# Patient Record
Sex: MALE | Race: BLACK OR AFRICAN AMERICAN | NOT HISPANIC OR LATINO | Employment: OTHER | URBAN - METROPOLITAN AREA
[De-identification: names, ages, dates, MRNs, and addresses within clinical notes are randomized per-mention and may not be internally consistent; named-entity substitution may affect disease eponyms.]

---

## 2017-01-03 ENCOUNTER — PATIENT MESSAGE (OUTPATIENT)
Dept: FAMILY MEDICINE | Facility: CLINIC | Age: 57
End: 2017-01-03

## 2017-01-03 ENCOUNTER — PATIENT MESSAGE (OUTPATIENT)
Dept: NEUROSURGERY | Facility: CLINIC | Age: 57
End: 2017-01-03

## 2017-01-03 DIAGNOSIS — G93.89 BRAIN MASS: ICD-10-CM

## 2017-01-03 DIAGNOSIS — H49.21 SIXTH NERVE PALSY OF RIGHT EYE: Primary | ICD-10-CM

## 2017-01-04 PROBLEM — G93.89 BRAIN MASS: Status: ACTIVE | Noted: 2017-01-04

## 2017-01-05 ENCOUNTER — PATIENT MESSAGE (OUTPATIENT)
Dept: FAMILY MEDICINE | Facility: CLINIC | Age: 57
End: 2017-01-05

## 2017-01-05 ENCOUNTER — TELEPHONE (OUTPATIENT)
Dept: FAMILY MEDICINE | Facility: CLINIC | Age: 57
End: 2017-01-05

## 2017-01-05 NOTE — TELEPHONE ENCOUNTER
Dr. Kong would like this patient to see Dr. Munroe for Sixth nerve palsy of right eye and Brain mass.  Please call the patient to schedule an appointment.  Thank you

## 2017-01-10 ENCOUNTER — OFFICE VISIT (OUTPATIENT)
Dept: OPHTHALMOLOGY | Facility: CLINIC | Age: 57
End: 2017-01-10
Payer: COMMERCIAL

## 2017-01-10 DIAGNOSIS — H49.21 SIXTH NERVE PALSY OF RIGHT EYE: ICD-10-CM

## 2017-01-10 DIAGNOSIS — G93.89 BRAIN MASS: Primary | ICD-10-CM

## 2017-01-10 PROCEDURE — 92060 SENSORIMOTOR EXAMINATION: CPT | Mod: S$GLB,,, | Performed by: OPHTHALMOLOGY

## 2017-01-10 PROCEDURE — 92012 INTRM OPH EXAM EST PATIENT: CPT | Mod: S$GLB,,, | Performed by: OPHTHALMOLOGY

## 2017-01-10 PROCEDURE — 99999 PR PBB SHADOW E&M-EST. PATIENT-LVL II: CPT | Mod: PBBFAC,,, | Performed by: OPHTHALMOLOGY

## 2017-01-10 NOTE — PROGRESS NOTES
HPI     DLS:  12/6/16    Pt here for 1  month f/u.  Pt states he hasn't noticed a change since his   last visit. Pt states he is still having horizontal diplopia.    No eyedrops  NO Ocular Sx in the past       Last edited by Bri Smith MA on 1/10/2017  9:35 AM.         Assessment /Plan     For exam results, see Encounter Report.    Brain mass    Sixth nerve palsy of right eye      Gave 20 PD ESTRELLITA fresnel prism OD to control diplopia while going through treatment for brain mass  RTC 3mo

## 2017-01-17 ENCOUNTER — TELEPHONE (OUTPATIENT)
Dept: FAMILY MEDICINE | Facility: CLINIC | Age: 57
End: 2017-01-17

## 2017-01-17 ENCOUNTER — OFFICE VISIT (OUTPATIENT)
Dept: FAMILY MEDICINE | Facility: CLINIC | Age: 57
End: 2017-01-17
Payer: COMMERCIAL

## 2017-01-17 VITALS
OXYGEN SATURATION: 95 % | HEART RATE: 83 BPM | WEIGHT: 197.56 LBS | DIASTOLIC BLOOD PRESSURE: 86 MMHG | TEMPERATURE: 99 F | SYSTOLIC BLOOD PRESSURE: 132 MMHG | BODY MASS INDEX: 29.26 KG/M2 | HEIGHT: 69 IN

## 2017-01-17 DIAGNOSIS — Z00.00 ANNUAL PHYSICAL EXAM: Primary | ICD-10-CM

## 2017-01-17 DIAGNOSIS — G93.89 BRAIN MASS: ICD-10-CM

## 2017-01-17 DIAGNOSIS — E78.1 HYPERTRIGLYCERIDEMIA: ICD-10-CM

## 2017-01-17 DIAGNOSIS — I10 ESSENTIAL HYPERTENSION: ICD-10-CM

## 2017-01-17 DIAGNOSIS — H49.21 SIXTH NERVE PALSY OF RIGHT EYE: ICD-10-CM

## 2017-01-17 PROCEDURE — 99999 PR PBB SHADOW E&M-EST. PATIENT-LVL III: CPT | Mod: PBBFAC,,, | Performed by: FAMILY MEDICINE

## 2017-01-17 PROCEDURE — 99396 PREV VISIT EST AGE 40-64: CPT | Mod: S$GLB,,, | Performed by: FAMILY MEDICINE

## 2017-01-17 PROCEDURE — 3079F DIAST BP 80-89 MM HG: CPT | Mod: S$GLB,,, | Performed by: FAMILY MEDICINE

## 2017-01-17 PROCEDURE — 3075F SYST BP GE 130 - 139MM HG: CPT | Mod: S$GLB,,, | Performed by: FAMILY MEDICINE

## 2017-01-17 NOTE — PROGRESS NOTES
"Chief Complaint   Patient presents with    Hypertension    Hyperlipidemia     SUBJECTIVE:   Blas Alcocer is a 56 y.o. male presenting for his annual checkup.  Current Outpatient Prescriptions   Medication Sig Dispense Refill    amlodipine (NORVASC) 5 MG tablet Take 1 tablet (5 mg total) by mouth once daily. 90 tablet 3    fish oil-omega-3 fatty acids 300-1,000 mg capsule Take 2 g by mouth once daily.      LACTOBACILLUS COMBO NO.6 (PROBIOTIC COMPLEX ORAL) Take by mouth once daily.      lisinopril-hydrochlorothiazide (PRINZIDE,ZESTORETIC) 10-12.5 mg per tablet Take 1 tablet by mouth once daily. 90 tablet 3     No current facility-administered medications for this visit.      Allergies: Review of patient's allergies indicates no known allergies.     ROS:  Feeling well. No dyspnea or chest pain on exertion. No abdominal pain, change in bowel habits, black or bloody stools. No urinary tract or prostatic symptoms. No neurological complaints.  Still with ocular complaints  OBJECTIVE:   The patient appears well, alert, oriented x 3, in no distress.   Visit Vitals    /86 (BP Location: Right arm, Patient Position: Sitting, BP Method: Manual)    Pulse 83    Temp 98.6 °F (37 °C) (Oral)    Ht 5' 9" (1.753 m)    Wt 89.6 kg (197 lb 8.5 oz)    SpO2 95%    BMI 29.17 kg/m2     Wt Readings from Last 5 Encounters:   01/17/17 89.6 kg (197 lb 8.5 oz)   12/20/16 87.6 kg (193 lb 2 oz)   10/05/16 88.4 kg (194 lb 14.2 oz)   04/15/16 87 kg (191 lb 12.8 oz)   01/04/16 87.6 kg (193 lb 2 oz)       ENT normal, still with 6th nerve palsy.  Neck supple. No adenopathy or thyromegaly. KEVIN. Lungs are clear, good air entry, no wheezes, rhonchi or rales. S1 and S2 normal, no murmurs, regular rate and rhythm. Abdomen is soft without tenderness, guarding, mass or organomegaly.  exam: deferred.  Extremities show no edema, normal peripheral pulses. Neurological is normal without focal findings.    ASSESSMENT:   1. Annual physical " exam    2. Essential hypertension    3. Brain mass    4. Sixth nerve palsy of right eye          PLAN:   Blas was seen today for hypertension and hyperlipidemia.    Diagnoses and all orders for this visit:    Annual physical exam  -     CBC auto differential; Future  -     Comprehensive metabolic panel; Future  -     Hemoglobin A1c; Future  -     Lipid panel; Future  -     TSH; Future  -     Uric acid; Future  -     Urinalysis; Future  -     PSA, Screening; Future  Counseled on age appropriate medical preventative services, including age appropriate cancer screenings, over all nutritional health, need for a consistent exercise regimen and an over all push towards maintaining a vigorous and active lifestyle.  Counseled on age appropriate vaccines and discussed upcoming health care needs based on age/gender.  Spent time with patient counseling on need for a good patient/doctor relationship moving forward.  Discussed use of common OTC medications and supplements.  Discussed common dietary aids and use of caffeine and the need for good sleep hygiene and stress management.    Essential hypertension  The current medical regimen is effective;  continue present plan and medications.    Brain mass  Second opinion  Sixth nerve palsy of right eye  We will get this opinionated and plan of care.    Hasn't taken the fish oil

## 2017-01-17 NOTE — TELEPHONE ENCOUNTER
----- Message from Reddy Kong MD sent at 1/17/2017 10:17 AM CST -----  Schedule 6 month recall, thank you!

## 2017-01-18 ENCOUNTER — LAB VISIT (OUTPATIENT)
Dept: LAB | Facility: HOSPITAL | Age: 57
End: 2017-01-18
Attending: FAMILY MEDICINE
Payer: COMMERCIAL

## 2017-01-18 ENCOUNTER — OFFICE VISIT (OUTPATIENT)
Dept: NEUROSURGERY | Facility: CLINIC | Age: 57
End: 2017-01-18
Payer: COMMERCIAL

## 2017-01-18 VITALS
TEMPERATURE: 99 F | SYSTOLIC BLOOD PRESSURE: 127 MMHG | DIASTOLIC BLOOD PRESSURE: 80 MMHG | HEART RATE: 86 BPM | HEIGHT: 69 IN | BODY MASS INDEX: 29.18 KG/M2 | WEIGHT: 197 LBS

## 2017-01-18 DIAGNOSIS — D32.9 MENINGIOMA: Primary | ICD-10-CM

## 2017-01-18 DIAGNOSIS — Z00.00 ANNUAL PHYSICAL EXAM: ICD-10-CM

## 2017-01-18 DIAGNOSIS — H49.21 SIXTH NERVE PALSY OF RIGHT EYE: ICD-10-CM

## 2017-01-18 LAB
ALBUMIN SERPL BCP-MCNC: 3.9 G/DL
ALP SERPL-CCNC: 47 U/L
ALT SERPL W/O P-5'-P-CCNC: 30 U/L
ANION GAP SERPL CALC-SCNC: 9 MMOL/L
AST SERPL-CCNC: 21 U/L
BASOPHILS # BLD AUTO: 0.03 K/UL
BASOPHILS NFR BLD: 0.4 %
BILIRUB SERPL-MCNC: 0.8 MG/DL
BILIRUB UR QL STRIP: NEGATIVE
BUN SERPL-MCNC: 12 MG/DL
CALCIUM SERPL-MCNC: 9.4 MG/DL
CHLORIDE SERPL-SCNC: 106 MMOL/L
CHOLEST/HDLC SERPL: 5.2 {RATIO}
CLARITY UR: CLEAR
CO2 SERPL-SCNC: 23 MMOL/L
COLOR UR: YELLOW
COMPLEXED PSA SERPL-MCNC: 2.3 NG/ML
CREAT SERPL-MCNC: 0.9 MG/DL
DIFFERENTIAL METHOD: ABNORMAL
EOSINOPHIL # BLD AUTO: 0.1 K/UL
EOSINOPHIL NFR BLD: 0.9 %
ERYTHROCYTE [DISTWIDTH] IN BLOOD BY AUTOMATED COUNT: 14.9 %
EST. GFR  (AFRICAN AMERICAN): >60 ML/MIN/1.73 M^2
EST. GFR  (NON AFRICAN AMERICAN): >60 ML/MIN/1.73 M^2
GLUCOSE SERPL-MCNC: 117 MG/DL
GLUCOSE UR QL STRIP: NEGATIVE
HCT VFR BLD AUTO: 43.8 %
HDL/CHOLESTEROL RATIO: 19.1 %
HDLC SERPL-MCNC: 199 MG/DL
HDLC SERPL-MCNC: 38 MG/DL
HGB BLD-MCNC: 15.5 G/DL
HGB UR QL STRIP: ABNORMAL
KETONES UR QL STRIP: NEGATIVE
LDLC SERPL CALC-MCNC: 133 MG/DL
LEUKOCYTE ESTERASE UR QL STRIP: NEGATIVE
LYMPHOCYTES # BLD AUTO: 2.5 K/UL
LYMPHOCYTES NFR BLD: 32.8 %
MCH RBC QN AUTO: 27.9 PG
MCHC RBC AUTO-ENTMCNC: 35.4 %
MCV RBC AUTO: 79 FL
MICROSCOPIC COMMENT: ABNORMAL
MONOCYTES # BLD AUTO: 0.7 K/UL
MONOCYTES NFR BLD: 9.5 %
NEUTROPHILS # BLD AUTO: 4.2 K/UL
NEUTROPHILS NFR BLD: 56.1 %
NITRITE UR QL STRIP: NEGATIVE
NON-SQ EPI CELLS #/AREA URNS HPF: 2 /HPF
NONHDLC SERPL-MCNC: 161 MG/DL
PH UR STRIP: 5 [PH] (ref 5–8)
PLATELET # BLD AUTO: 266 K/UL
PMV BLD AUTO: 10.7 FL
POTASSIUM SERPL-SCNC: 4.6 MMOL/L
PROT SERPL-MCNC: 6.4 G/DL
PROT UR QL STRIP: NEGATIVE
RBC # BLD AUTO: 5.56 M/UL
RBC #/AREA URNS HPF: 4 /HPF (ref 0–4)
SODIUM SERPL-SCNC: 138 MMOL/L
SP GR UR STRIP: 1.02 (ref 1–1.03)
TRIGL SERPL-MCNC: 140 MG/DL
TSH SERPL DL<=0.005 MIU/L-ACNC: 0.41 UIU/ML
URATE SERPL-MCNC: 5.3 MG/DL
URN SPEC COLLECT METH UR: ABNORMAL
UROBILINOGEN UR STRIP-ACNC: NEGATIVE EU/DL
WBC # BLD AUTO: 7.55 K/UL

## 2017-01-18 PROCEDURE — 1159F MED LIST DOCD IN RCRD: CPT | Mod: S$GLB,,, | Performed by: NEUROLOGICAL SURGERY

## 2017-01-18 PROCEDURE — 99212 OFFICE O/P EST SF 10 MIN: CPT | Mod: S$GLB,,, | Performed by: NEUROLOGICAL SURGERY

## 2017-01-18 PROCEDURE — 99999 PR PBB SHADOW E&M-EST. PATIENT-LVL III: CPT | Mod: PBBFAC,,, | Performed by: NEUROLOGICAL SURGERY

## 2017-01-18 PROCEDURE — 3079F DIAST BP 80-89 MM HG: CPT | Mod: S$GLB,,, | Performed by: NEUROLOGICAL SURGERY

## 2017-01-18 PROCEDURE — 3074F SYST BP LT 130 MM HG: CPT | Mod: S$GLB,,, | Performed by: NEUROLOGICAL SURGERY

## 2017-01-18 NOTE — LETTER
January 18, 2017      Reddy Kong MD  [address] [address]           [facility] 7th Fl  [address]  [facility] [address]  Phone: [phone]          Patient: Blas Alcocer   MR Number: [id number]   YOB: 1960   Date of Visit: 1/18/2017       Dear Dr. Reddy Kong:    Thank you for referring Blas Alcocer to me for evaluation. Attached you will find relevant portions of my assessment and plan of care.    If you have questions, please do not hesitate to call me. I look forward to following Blas Alcocer along with you.    Sincerely,    Rico Munroe MD    Enclosure  CC:  No Recipients    If you would like to receive this communication electronically, please contact [email or web address] or [phone] to request more information on [employer] Link access.    For providers and/or their staff who would like to refer a patient to [facility], please contact us through our one-stop-shop provider referral line, [facility], at [phone].    If you feel you have received this communication in error or would no longer like to receive these types of communications, please e-mail [email or web address]

## 2017-01-18 NOTE — PROGRESS NOTES
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 male who has been complaining of diplopia when he looks to the right side. His symptoms started 2 years ago and have remained stable. Denies facial pain, headaches, vertigo, ataxia. He saw Dr Jones in 12/2016 who recommended radiosurgery. He is here for a second opinion.     PAST MEDICAL HISTORY:  Active Ambulatory Problems     Diagnosis Date Noted    Hypertriglyceridemia     Prediabetes     Hypovitaminosis D     Hypertension 08/19/2014    Sixth nerve palsy of right eye 12/06/2016    Brain mass 01/04/2017     Resolved Ambulatory Problems     Diagnosis Date Noted    No Resolved Ambulatory Problems     Past Medical History   Diagnosis Date    Dyslipidemia (high LDL; low HDL) 6/13    History of elevated prostate specific antigen (PSA)        PAST SURGICAL HISTORY:  Past Surgical History   Procedure Laterality Date    Prostate biopsy       negative       SOCIAL HISTORY:   Social History     Social History    Marital status: Significant Other     Spouse name: N/A    Number of children: N/A    Years of education: N/A     Occupational History    Not on file.     Social History Main Topics    Smoking status: Never Smoker    Smokeless tobacco: Not on file    Alcohol use Yes    Drug use: No    Sexual activity: Yes     Partners: Female     Other Topics Concern    Not on file     Social History Narrative       FAMILY HISTORY:  Family History   Problem Relation Age of Onset    Diabetes Sister     Hypertension Sister     Diabetes Brother     Blindness Neg Hx     Glaucoma Neg Hx     Retinal detachment Neg Hx     Macular degeneration Neg Hx        CURRENTS MEDICATIONS:  Current Outpatient Prescriptions  on File Prior to Visit   Medication Sig Dispense Refill    amlodipine (NORVASC) 5 MG tablet Take 1 tablet (5 mg total) by mouth once daily. 90 tablet 3    LACTOBACILLUS COMBO NO.6 (PROBIOTIC COMPLEX ORAL) Take by mouth once daily.      lisinopril-hydrochlorothiazide (PRINZIDE,ZESTORETIC) 10-12.5 mg per tablet Take 1 tablet by mouth once daily. 90 tablet 3    fish oil-omega-3 fatty acids 300-1,000 mg capsule Take 2 g by mouth once daily.       No current facility-administered medications on file prior to visit.        ALLERGIES:  Review of patient's allergies indicates:  No Known Allergies    REVIEW OF SYSTEMS:  Review of Systems   Constitutional: Negative for diaphoresis, fever and weight loss.   Respiratory: Negative for shortness of breath.    Cardiovascular: Negative for chest pain.   Gastrointestinal: Negative for blood in stool.   Genitourinary: Negative for hematuria.   Endo/Heme/Allergies: Does not bruise/bleed easily.   All other systems reviewed and are negative.      OBJECTIVE:    PHYSICAL EXAMINATION:   Vitals:    01/18/17 1513   BP: 127/80   Pulse: 86   Temp: 98.8 °F (37.1 °C)       Physical Exam:  Vitals reviewed.    Constitutional: He appears well-developed and well-nourished.     Eyes: Pupils are equal, round, and reactive to light. Conjunctivae are normal.     Cardiovascular: Normal distal pulses and no edema.     Abdominal: Soft.     Skin: Skin displays no rash on trunk and no rash on extremities. Skin displays no lesions on trunk and no lesions on extremities.     Psych/Behavior: He is alert. He is oriented to person, place, and time. He has a normal mood and affect.     Musculoskeletal:        Neck: Range of motion is full.     Neurological:        DTRs: Tricep reflexes are 2+ on the right side and 2+ on the left side. Bicep reflexes are 2+ on the right side and 2+ on the left side. Brachioradialis reflexes are 2+ on the right side and 2+ on the left side. Patellar reflexes are 2+ on the right  side and 2+ on the left side. Achilles reflexes are 2+ on the right side and 2+ on the left side.        Cranial nerves: Cranial nerve(s) VIII, IX, XI and XII are intact.       Back Exam     Muscle Strength   Right Quadriceps:  5/5   Left Quadriceps:  5/5   Right Hamstrings:  5/5   Left Hamstrings:  5/5             SI joint:   Palpation at the right and left SI joints not painful  MARIAELENA test is negative bilaterally  Gaenslen test is negative bilaterally  Thigh thrust test is negative bilaterally    Neurologic Exam     Mental Status   Oriented to person, place, and time.   Speech: speech is normal   Level of consciousness: alert    Cranial Nerves     CN II   Visual fields full to confrontation.     CN III, IV, VI   Pupils are equal, round, and reactive to light.  CN III: no CN III palsy  CN VI: right CN VI palsy  Diplopia: right and horizontal    CN V   Facial sensation intact.     CN VII   Facial expression full, symmetric.     CN VIII   CN VIII normal.     CN IX, X   CN IX normal.     CN XI   CN XI normal.     CN XII   CN XII normal.     Motor Exam   Muscle bulk: normal  Overall muscle tone: normal    Strength   Right deltoid: 5/5  Left deltoid: 5/5  Right biceps: 5/5  Left biceps: 5/5  Right triceps: 5/5  Left triceps: 5/5  Right wrist flexion: 5/5  Left wrist flexion: 5/5  Right wrist extension: 5/5  Left wrist extension: 5/5  Right interossei: 5/5  Left interossei: 5/5  Right iliopsoas: 5/5  Left iliopsoas: 5/5  Right quadriceps: 5/5  Left quadriceps: 5/5  Right hamstrin/5  Left hamstrin/5  Right anterior tibial: 5/5  Left anterior tibial: 5/5  Right posterior tibial: 5/5  Left posterior tibial: 5/5  Right peroneal: 5/5  Left peroneal: 5/5  Right gastroc: 5/5  Left gastroc: 5/5    Sensory Exam   Light touch normal.   Pinprick normal.     Gait, Coordination, and Reflexes     Gait  Gait: normal    Coordination   Finger to nose coordination: normal  Tandem walking coordination: normal    Reflexes   Right  brachioradialis: 2+  Left brachioradialis: 2+  Right biceps: 2+  Left biceps: 2+  Right triceps: 2+  Left triceps: 2+  Right patellar: 2+  Left patellar: 2+  Right achilles: 2+  Left achilles: 2+  Right plantar: normal  Left plantar: normal  Right Wang: absent  Left Wang: absent  Right ankle clonus: absent  Left ankle clonus: absent        DIAGNOSTIC DATA:  I personally reviewed the following imaging:   Brain MRI 12/12/2016: right petrous apex meningioma 0.8 x 1.6 x 1.8 cm (AP x TV x CC) compressing the right VI nerve near it entrance at the Dorello's canal    ASSESMENT:  This is a 56 y.o. 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    PLAN:  I explained the natural history of the disease and all treatment options including observation, surgery and/or radiosurgery. I recommended Gamma Knife radiosurgery with Dr Jones.  Patient would like to follow-up with Dr Jones to schedule his treatment.          Rico Munroe MD  Pager: [phone]

## 2017-01-19 ENCOUNTER — PATIENT MESSAGE (OUTPATIENT)
Dept: NEUROSURGERY | Facility: CLINIC | Age: 57
End: 2017-01-19

## 2017-01-19 LAB
ESTIMATED AVG GLUCOSE: 114 MG/DL
HBA1C MFR BLD HPLC: 5.6 %

## 2017-02-01 ENCOUNTER — OFFICE VISIT (OUTPATIENT)
Dept: NEUROSURGERY | Facility: CLINIC | Age: 57
End: 2017-02-01
Payer: COMMERCIAL

## 2017-02-01 VITALS
WEIGHT: 197.06 LBS | HEIGHT: 69 IN | SYSTOLIC BLOOD PRESSURE: 130 MMHG | DIASTOLIC BLOOD PRESSURE: 89 MMHG | HEART RATE: 71 BPM | BODY MASS INDEX: 29.19 KG/M2

## 2017-02-01 DIAGNOSIS — H49.21 SIXTH NERVE PALSY OF RIGHT EYE: ICD-10-CM

## 2017-02-01 DIAGNOSIS — D32.9 MENINGIOMA: Primary | ICD-10-CM

## 2017-02-01 PROCEDURE — 3075F SYST BP GE 130 - 139MM HG: CPT | Mod: S$GLB,,, | Performed by: NEUROLOGICAL SURGERY

## 2017-02-01 PROCEDURE — 99213 OFFICE O/P EST LOW 20 MIN: CPT | Mod: S$GLB,,, | Performed by: NEUROLOGICAL SURGERY

## 2017-02-01 PROCEDURE — 99999 PR PBB SHADOW E&M-EST. PATIENT-LVL III: CPT | Mod: PBBFAC,,, | Performed by: NEUROLOGICAL SURGERY

## 2017-02-01 PROCEDURE — 3079F DIAST BP 80-89 MM HG: CPT | Mod: S$GLB,,, | Performed by: NEUROLOGICAL SURGERY

## 2017-02-01 NOTE — PATIENT INSTRUCTIONS
The patient did follow up with Dr. Munroe, who agreed with my assessment and also recommended Gamma Knife Radiosurgery. I have explained that this is located near Dorello's canal, which is causing the right sixth cranial nerve palsy. We would be able to treat this quite accurately and effectively with Gamma Knife.  The patient would like to proceed with Radiosurgery. I have discussed the risks/benefits, indications, and alternatives for the proposed procedure in detail.  I have answered all of their questions and the pt wishes to proceed with the procedure. We will schedule the pt.

## 2017-02-01 NOTE — PROGRESS NOTES
"Subjective:    I, Emma Murcia, am scribing for, and in the presence of, Dr. Henrique Jones.     Patient ID: Blas Alcocer is a 56 y.o. male.    Chief Complaint: No chief complaint on file.    HPI This is a 56-year-old man who presents today for follow up after seeing Dr. Munroe. At last visit, I recommended Gamma Knife Radiosurgery for treatment of the meningioma. The pt stated that he would like a second opinion regarding this and followed up with Dr. Munroe. The patient denies any changes since last office visit. He denies any new complaints.    Review of Systems   Constitutional: Negative for activity change, fatigue and fever.   HENT: Negative for facial swelling.    Eyes: Positive for visual disturbance.   Respiratory: Negative.    Cardiovascular: Negative.    Gastrointestinal: Negative for diarrhea, nausea and vomiting.   Genitourinary: Negative.    Musculoskeletal: Negative for back pain, joint swelling and myalgias.   Neurological: Negative for seizures, weakness, numbness and headaches.   Psychiatric/Behavioral: Negative.        Past Medical History   Diagnosis Date    Dyslipidemia (high LDL; low HDL) 6/13    History of elevated prostate specific antigen (PSA)     Hypertension     Hypovitaminosis D 6/13    Prediabetes 6/13    Sixth nerve palsy of right eye 12/6/2016     Objective:       Visit Vitals    /89    Pulse 71    Ht 5' 9" (1.753 m)    Wt 89.4 kg (197 lb 1.5 oz)    BMI 29.11 kg/m2       Physical Exam   Constitutional: He is oriented to person, place, and time. He appears well-developed and well-nourished.   HENT:   Head: Normocephalic and atraumatic.   Neck: Neck supple.   Neurological: He is alert and oriented to person, place, and time. No cranial nerve deficit. He displays a negative Romberg sign. GCS eye subscore is 4. GCS verbal subscore is 5. GCS motor subscore is 6.       Imaging:  MRI of the brain, dated 12/12/2016, shows an small enhancing lesion measuring 1.8 cm centered in " the right prepontine cistern and extending towards the Dorello's canal, which appears to be a meningioma. This is causing a right abducens/sixth cranial nerve palsy.      I have personally reviewed the images with the pt.     I, Dr. Henrique Jones, personally performed the services described in this documentation as scribed by Emma Murcia in my presence, and it is both accurate and complete.  Assessment:       Meningioma.    Plan:   The patient did follow up with Dr. Munroe, who agreed with my assessment and also recommended Gamma Knife Radiosurgery. I have explained that this is located near Dorello's canal, which is causing the right sixth cranial nerve palsy. We would be able to treat this quite accurately and effectively with Gamma Knife.  The patient would like to proceed with Radiosurgery. I have discussed the risks/benefits, indications, and alternatives for the proposed procedure in detail.  I have answered all of their questions and the pt wishes to proceed with the procedure. We will schedule the pt.

## 2017-03-15 ENCOUNTER — OFFICE VISIT (OUTPATIENT)
Dept: NEUROSURGERY | Facility: CLINIC | Age: 57
End: 2017-03-15
Payer: COMMERCIAL

## 2017-03-15 ENCOUNTER — TELEPHONE (OUTPATIENT)
Dept: NEUROSURGERY | Facility: CLINIC | Age: 57
End: 2017-03-15

## 2017-03-15 VITALS
BODY MASS INDEX: 29.18 KG/M2 | TEMPERATURE: 98 F | WEIGHT: 197 LBS | HEART RATE: 65 BPM | DIASTOLIC BLOOD PRESSURE: 94 MMHG | HEIGHT: 69 IN | SYSTOLIC BLOOD PRESSURE: 145 MMHG

## 2017-03-15 DIAGNOSIS — H49.21 SIXTH NERVE PALSY OF RIGHT EYE: ICD-10-CM

## 2017-03-15 DIAGNOSIS — G93.89 BRAIN MASS: Primary | ICD-10-CM

## 2017-03-15 DIAGNOSIS — D32.9 MENINGIOMA: Primary | ICD-10-CM

## 2017-03-15 PROCEDURE — 99999 PR PBB SHADOW E&M-EST. PATIENT-LVL III: CPT | Mod: PBBFAC,,, | Performed by: NEUROLOGICAL SURGERY

## 2017-03-15 PROCEDURE — 3080F DIAST BP >= 90 MM HG: CPT | Mod: S$GLB,,, | Performed by: NEUROLOGICAL SURGERY

## 2017-03-15 PROCEDURE — 3077F SYST BP >= 140 MM HG: CPT | Mod: S$GLB,,, | Performed by: NEUROLOGICAL SURGERY

## 2017-03-15 PROCEDURE — 99024 POSTOP FOLLOW-UP VISIT: CPT | Mod: S$GLB,,, | Performed by: NEUROLOGICAL SURGERY

## 2017-03-15 PROCEDURE — 1160F RVW MEDS BY RX/DR IN RCRD: CPT | Mod: S$GLB,,, | Performed by: NEUROLOGICAL SURGERY

## 2017-03-15 NOTE — PATIENT INSTRUCTIONS
The patient is doing well 2-weeks after Gamma Knife Radiosurgery. I will schedule him follow up in 3 months with MRI of the brain. I would like him to follow up with Ophthalmology just prior to his follow up with me.

## 2017-03-15 NOTE — MR AVS SNAPSHOT
[facility] Neurosurgery 7th Fl  [address]  [facility] [address]  Phone: [phone]                  Blas Alcocer   3/15/2017 9:45 AM   Office Visit    Description:  Male : 1960   Provider:  Henrique Jones MD   Department:  [address]           Reason for Visit     Post-op Evaluation                To Do List           Future Appointments        Provider Department Dept Phone    2017 9:00 AM [facility] MRI4 [facility] [phone]    2017 10:30 AM Henrique Jones MD [facility] Neurosurgery 7th Fl [phone]      Goals (5 Years of Data)     None      [facility]     [facility] On Call Nurse Care Line -  Assistance  Registered nurses in the [facility] On Call Center provide clinical advisement, health education, appointment booking, and other advisory services.  Call for this free service at [phone].             Medications           Message regarding Medications     Verify the changes and/or additions to your medication regime listed below are the same as discussed with your clinician today.  If any of these changes or additions are incorrect, please notify your healthcare provider.             Verify that the below list of medications is an accurate representation of the medications you are currently taking.  If none reported, the list may be blank. If incorrect, please contact your healthcare provider. Carry this list with you in case of emergency.           Current Medications     amlodipine (NORVASC) 5 MG tablet Take 1 tablet (5 mg total) by mouth once daily.    fish oil-omega-3 fatty acids 300-1,000 mg capsule Take 2 g by mouth once daily.    LACTOBACILLUS COMBO NO.6 (PROBIOTIC COMPLEX ORAL) Take by mouth once daily.    lisinopril-hydrochlorothiazide (PRINZIDE,ZESTORETIC) 10-12.5 mg per tablet Take 1 tablet by mouth once daily.    saw palmetto 80 MG capsule Take 80 mg by mouth once daily.           Clinical Reference Information  "          Your Vitals Were     BP Pulse Temp Height Weight BMI    145/94 65 98.3 °F (36.8 °C) 5' 9" (1.753 m) 89.4 kg (197 lb) 29.09 kg/m2      Blood Pressure          Most Recent Value    BP  (!)  145/94      Allergies as of 3/15/2017     No Known Allergies      Immunizations Administered on Date of Encounter - 3/15/2017     None      Instructions    The patient is doing well 2-weeks after Gamma Knife Radiosurgery. I will schedule him follow up in 3 months with MRI of the brain. I would like him to follow up with Ophthalmology just prior to his follow up with me.       Language Assistance Services     ATTENTION: Language assistance services are available, free of charge. Please call [phone].      ATENCIÓN: Si mina morales, tiene a leong disposición servicios gratuitos de asistencia lingüística. Llame al [phone].     ANANTH Ý: N?u b?n nói Ti?ng Vi?t, có các d?ch v? h? tr? ngôn ng? mi?n phí dành cho b?n. G?i s? [phone].         [facility] complies with applicable Federal civil rights laws and does not discriminate on the basis of race, color, national origin, age, disability, or sex.        "

## 2017-03-15 NOTE — LETTER
March 15, 2017      Reddy Kong MD  [address] [address]           [facility] 7th Fl  [address]  [facility] [address]  Phone: [phone]          Patient: Blas Alcocer   MR Number: [id number]   YOB: 1960   Date of Visit: 3/15/2017       Dear Dr. Reddy Kong:    Thank you for referring Blas Alcocer to me for evaluation. Attached you will find relevant portions of my assessment and plan of care.    If you have questions, please do not hesitate to call me. I look forward to following Blas Alcocer along with you.    Sincerely,    Henrique Jones MD    Enclosure  CC:  No Recipients    If you would like to receive this communication electronically, please contact [email or web address] or [phone] to request more information on [employer] Link access.    For providers and/or their staff who would like to refer a patient to [facility], please contact us through our one-stop-shop provider referral line, [facility], at [phone].    If you feel you have received this communication in error or would no longer like to receive these types of communications, please e-mail [email or web address]

## 2017-03-15 NOTE — PROGRESS NOTES
"Subjective:    I, Emma Murcia, am scribing for, and in the presence of, Dr. Henrique Jnoes.     Patient ID: Blas Alcocer is a 56 y.o. male.    Chief Complaint: Post-op Evaluation    HPI This is a 56-year-old male with meningioma and sixth nerve palsy of the right eye who presents 2-weeks post-op Gamma Knife Radiosurgery. The patient states that he is feeling well. He reports experiencing scalp numbness immediately after the procedure, but states it has resolved. He has not experienced improvement of the sixth nerve palsy.    Review of Systems   Constitutional: Negative for activity change, fatigue and fever.   HENT: Negative for facial swelling.    Eyes:        Palsy of the right eye   Respiratory: Negative.    Cardiovascular: Negative.    Gastrointestinal: Negative for diarrhea, nausea and vomiting.   Genitourinary: Negative.    Musculoskeletal: Negative for back pain, joint swelling and myalgias.   Neurological: Positive for numbness (scalp numbness). Negative for seizures, weakness and headaches.   Psychiatric/Behavioral: Negative.        Past Medical History:   Diagnosis Date    Dyslipidemia (high LDL; low HDL) 6/13    History of elevated prostate specific antigen (PSA)     Hypertension     Hypovitaminosis D 6/13    Prediabetes 6/13    Sixth nerve palsy of right eye 12/6/2016     Objective:     BP (!) 145/94  Pulse 65  Temp 98.3 °F (36.8 °C)  Ht 5' 9" (1.753 m)  Wt 89.4 kg (197 lb)  BMI 29.09 kg/m2  Physical Exam   Constitutional: He is oriented to person, place, and time. He appears well-developed and well-nourished.   HENT:   Head: Normocephalic and atraumatic.   Eyes:   Sixth nerve palsy of the right eye   Neck: Neck supple.   Neurological: He is alert and oriented to person, place, and time. No cranial nerve deficit. He displays a negative Romberg sign. GCS eye subscore is 4. GCS verbal subscore is 5. GCS motor subscore is 6.       I, Dr. Henrique Jones, personally performed the services described " in this documentation as scribed by Emma Murcia in my presence, and it is both accurate and complete.  Assessment:       Meningioma    Plan:   The patient is doing well 2-weeks after Gamma Knife Radiosurgery. I will schedule him follow up in 3 months with MRI of the brain. I would like him to follow up with Ophthalmology just prior to his follow up with me.

## 2017-06-14 ENCOUNTER — HOSPITAL ENCOUNTER (OUTPATIENT)
Dept: RADIOLOGY | Facility: HOSPITAL | Age: 57
Discharge: HOME | End: 2017-06-14
Attending: NEUROLOGICAL SURGERY
Payer: COMMERCIAL

## 2017-06-14 ENCOUNTER — OFFICE VISIT (OUTPATIENT)
Dept: NEUROSURGERY | Facility: CLINIC | Age: 57
End: 2017-06-14
Payer: COMMERCIAL

## 2017-06-14 VITALS
WEIGHT: 193.88 LBS | HEART RATE: 73 BPM | SYSTOLIC BLOOD PRESSURE: 124 MMHG | DIASTOLIC BLOOD PRESSURE: 83 MMHG | HEIGHT: 69 IN | TEMPERATURE: 98 F | BODY MASS INDEX: 28.72 KG/M2

## 2017-06-14 DIAGNOSIS — G93.89 BRAIN MASS: Primary | ICD-10-CM

## 2017-06-14 DIAGNOSIS — D32.9 MENINGIOMA: ICD-10-CM

## 2017-06-14 DIAGNOSIS — H49.21 SIXTH NERVE PALSY OF RIGHT EYE: ICD-10-CM

## 2017-06-14 DIAGNOSIS — G93.89 BRAIN MASS: ICD-10-CM

## 2017-06-14 PROCEDURE — 99999 PR PBB SHADOW E&M-EST. PATIENT-LVL III: CPT | Mod: PBBFAC,,, | Performed by: NEUROLOGICAL SURGERY

## 2017-06-14 PROCEDURE — 99214 OFFICE O/P EST MOD 30 MIN: CPT | Mod: S$GLB,,, | Performed by: NEUROLOGICAL SURGERY

## 2017-06-14 PROCEDURE — 70553 MRI BRAIN STEM W/O & W/DYE: CPT | Mod: 26,,, | Performed by: RADIOLOGY

## 2017-06-14 RX ORDER — GADOBUTROL 604.72 MG/ML
10 INJECTION INTRAVENOUS
Status: COMPLETED | OUTPATIENT
Start: 2017-06-14 | End: 2017-06-14

## 2017-06-14 RX ADMIN — GADOBUTROL 10 ML: 604.72 INJECTION INTRAVENOUS at 09:06

## 2017-06-14 NOTE — PROGRESS NOTES
"Subjective:    I, Emma Henderson, am scribing for, and in the presence of, Dr. Henrique Jones.     Patient ID: Blas Alcocer is a 56 y.o. male.    Chief Complaint: Follow-up (3 month)    HPI This is 56-year-old male with history of meningioma and sixth nerve palsy, s/p Gamma Knife Radiosurgery, who presents today for 3 month follow up.  The patient states that his sixth nerve palsy of the right eye is improving. He has particularly noticed the improvement when playing golf.    Review of Systems   Constitutional: Negative for activity change, fatigue and fever.   HENT: Negative for facial swelling.    Eyes:        +Right eye palsy (improved compared to prior)   Respiratory: Negative.    Cardiovascular: Negative.    Gastrointestinal: Negative for diarrhea, nausea and vomiting.   Genitourinary: Negative.    Musculoskeletal: Negative for back pain, joint swelling and myalgias.   Neurological: Negative for seizures, weakness, numbness and headaches.   Psychiatric/Behavioral: Negative.        Past Medical History:   Diagnosis Date    Dyslipidemia (high LDL; low HDL) 6/13    History of elevated prostate specific antigen (PSA)     Hypertension     Hypovitaminosis D 6/13    Prediabetes 6/13    Sixth nerve palsy of right eye 12/6/2016     Objective:     /83 (BP Location: Right arm, Patient Position: Sitting, BP Method: Automatic)   Pulse 73   Temp 97.9 °F (36.6 °C) (Oral)   Ht 5' 9" (1.753 m)   Wt 88 kg (193 lb 14.4 oz)   BMI 28.63 kg/m²   Physical Exam   Constitutional: He is oriented to person, place, and time. He appears well-developed and well-nourished.   HENT:   Head: Normocephalic and atraumatic.   Neck: Neck supple.   Neurological: He is alert and oriented to person, place, and time. No cranial nerve deficit. He displays a negative Romberg sign. GCS eye subscore is 4. GCS verbal subscore is 5. GCS motor subscore is 6.       Imaging:  MRI of the brain w/wo contrast, dated 6/14/2017, shows decrease in size of " the lesion in the right prepontine cistern now measuring 17 mm x 6 mm. This is compared to the scan from 12/12/2016 when the mass measured 19 mm by 8 mm.    I have personally reviewed the images with the pt.      I, Dr. Henrique Jones, personally performed the services described in this documentation as scribed by Emma Henderson in my presence, and it is both accurate and complete.  Assessment:       1. Brain mass    2. Meningioma    3. Sixth nerve palsy of right eye        Plan:   I have reviewed the MRI of the brain w/wo contrast with the patient, which shows decrease in size of the lesion in the right prepontine cistern now measuring 17 mm x 6 mm. This is compared to the scan from 12/12/2016 when the mass measured 19 mm by 8 mm. I will schedule the patient follow up in 6 months with repeat MRI of the brain w/wo contrast at that time.

## 2017-06-14 NOTE — LETTER
June 19, 2017      Reddy Kong MD  [address] [place] [address]           [facility] - Neurosurgery 7th Fl  [address]  [facility] [address]  Phone: [phone]          Patient: Blas Alcocer   MR Number: [id number]   YOB: 1960   Date of Visit: 6/14/2017       Dear Dr. Reddy Kong:    Thank you for referring Blas Alcocer to me for evaluation. Attached you will find relevant portions of my assessment and plan of care.    If you have questions, please do not hesitate to call me. I look forward to following Blas Alcocer along with you.    Sincerely,    Rula Isaac  CC:  No Recipients    If you would like to receive this communication electronically, please contact [email or web address] or [phone] to request more information on [employer] Link access.    For providers and/or their staff who would like to refer a patient to [facility], please contact us through our one-stop-shop provider referral line, [facility] , at [phone].    If you feel you have received this communication in error or would no longer like to receive these types of communications, please e-mail [email or web address]

## 2017-06-14 NOTE — PATIENT INSTRUCTIONS
I have reviewed the MRI of the brain w/wo contrast with the patient, which shows decrease in size of the lesion in the right prepontine cistern now measuring 17 mm x 6 mm. This is compared to the scan from 12/12/2016 when the mass measured 19 mm by 8 mm. I will schedule the patient follow up in 6 months with repeat MRI of the brain w/wo contrast at that time.

## 2017-07-07 ENCOUNTER — OFFICE VISIT (OUTPATIENT)
Dept: FAMILY MEDICINE | Facility: CLINIC | Age: 57
End: 2017-07-07
Payer: COMMERCIAL

## 2017-07-07 ENCOUNTER — LAB VISIT (OUTPATIENT)
Dept: LAB | Facility: HOSPITAL | Age: 57
End: 2017-07-07
Attending: FAMILY MEDICINE
Payer: COMMERCIAL

## 2017-07-07 VITALS
BODY MASS INDEX: 28.73 KG/M2 | HEIGHT: 69 IN | HEART RATE: 84 BPM | DIASTOLIC BLOOD PRESSURE: 76 MMHG | OXYGEN SATURATION: 97 % | WEIGHT: 194 LBS | TEMPERATURE: 98 F | SYSTOLIC BLOOD PRESSURE: 110 MMHG

## 2017-07-07 DIAGNOSIS — G93.89 BRAIN MASS: Primary | ICD-10-CM

## 2017-07-07 DIAGNOSIS — Z97.3 WEARS GLASSES: ICD-10-CM

## 2017-07-07 DIAGNOSIS — Z01.00 ENCOUNTER FOR VISION SCREENING: ICD-10-CM

## 2017-07-07 DIAGNOSIS — R73.03 PREDIABETES: ICD-10-CM

## 2017-07-07 DIAGNOSIS — I10 ESSENTIAL HYPERTENSION: ICD-10-CM

## 2017-07-07 LAB
ANION GAP SERPL CALC-SCNC: 8 MMOL/L
BUN SERPL-MCNC: 15 MG/DL
CALCIUM SERPL-MCNC: 9.8 MG/DL
CHLORIDE SERPL-SCNC: 103 MMOL/L
CO2 SERPL-SCNC: 24 MMOL/L
CREAT SERPL-MCNC: 1 MG/DL
EST. GFR  (AFRICAN AMERICAN): >60 ML/MIN/1.73 M^2
EST. GFR  (NON AFRICAN AMERICAN): >60 ML/MIN/1.73 M^2
GLUCOSE SERPL-MCNC: 110 MG/DL
POTASSIUM SERPL-SCNC: 3.9 MMOL/L
SODIUM SERPL-SCNC: 135 MMOL/L

## 2017-07-07 PROCEDURE — 99999 PR PBB SHADOW E&M-EST. PATIENT-LVL III: CPT | Mod: PBBFAC,,, | Performed by: FAMILY MEDICINE

## 2017-07-07 PROCEDURE — 80048 BASIC METABOLIC PNL TOTAL CA: CPT

## 2017-07-07 PROCEDURE — 99214 OFFICE O/P EST MOD 30 MIN: CPT | Mod: S$GLB,,, | Performed by: FAMILY MEDICINE

## 2017-07-07 PROCEDURE — 36415 COLL VENOUS BLD VENIPUNCTURE: CPT

## 2017-07-07 RX ORDER — LISINOPRIL AND HYDROCHLOROTHIAZIDE 10; 12.5 MG/1; MG/1
1 TABLET ORAL DAILY
Qty: 90 TABLET | Refills: 3 | Status: SHIPPED | OUTPATIENT
Start: 2017-07-07 | End: 2018-08-02 | Stop reason: SDUPTHER

## 2017-07-07 RX ORDER — AMLODIPINE BESYLATE 5 MG/1
5 TABLET ORAL DAILY
Qty: 90 TABLET | Refills: 3 | Status: SHIPPED | OUTPATIENT
Start: 2017-07-07 | End: 2018-08-02 | Stop reason: SDUPTHER

## 2017-07-07 NOTE — PROGRESS NOTES
"Chief Complaint   Patient presents with    Hypertension       SUBJECTIVE:  Blas Alcocer is a 57 y.o. male here for follow up of brain mass and HTN.  Currently has co-morbidities including per problem list.      Social History   Substance Use Topics    Smoking status: Never Smoker    Smokeless tobacco: Never Used    Alcohol use Yes       Patient Active Problem List    Diagnosis Date Noted    Wears glasses 07/07/2017    Brain mass 01/04/2017     Most likley meningioma causing abducen's palsy      Sixth nerve palsy of right eye 12/06/2016    Hypertension 08/19/2014    Hypertriglyceridemia     Prediabetes     Hypovitaminosis D        Review of Systems   Constitutional: Negative.    HENT: Negative.    Eyes: Negative.    Respiratory: Negative.    Cardiovascular: Negative.    Gastrointestinal: Negative.    Genitourinary: Negative.    Musculoskeletal: Negative.    Skin: Negative.    Neurological: Negative.    Endo/Heme/Allergies: Negative.    Psychiatric/Behavioral: Negative.        OBJECTIVE:  /76 (BP Location: Right arm, Patient Position: Sitting, BP Method: Manual)   Pulse 84   Temp 97.9 °F (36.6 °C) (Oral)   Ht 5' 9" (1.753 m)   Wt 88 kg (194 lb 0.1 oz)   SpO2 97%   BMI 28.65 kg/m²     Wt Readings from Last 3 Encounters:   07/07/17 88 kg (194 lb 0.1 oz)   06/14/17 88 kg (193 lb 14.4 oz)   03/15/17 89.4 kg (197 lb)     BP Readings from Last 3 Encounters:   07/07/17 110/76   06/14/17 124/83   03/15/17 (!) 145/94       He appears well, in no apparent distress.  Alert and oriented times three, pleasant and cooperative. Vital signs are as documented in vital signs section.  S1 and S2 normal, no murmurs, clicks, gallops or rubs. Regular rate and rhythm. Chest is clear; no wheezes or rales. No edema or JVD.      Review of old Records:  Reviewed per epic     Review of old labs:    Lab Results   Component Value Date    TSH 0.412 01/18/2017     Lab Results   Component Value Date    WBC 7.55 01/18/2017    " HGB 15.5 01/18/2017    HCT 43.8 01/18/2017    MCV 79 (L) 01/18/2017     01/18/2017       Chemistry        Component Value Date/Time     01/18/2017 0853    K 4.6 01/18/2017 0853     01/18/2017 0853    CO2 23 01/18/2017 0853    BUN 12 01/18/2017 0853    CREATININE 0.9 01/18/2017 0853     (H) 01/18/2017 0853        Component Value Date/Time    CALCIUM 9.4 01/18/2017 0853    ALKPHOS 47 (L) 01/18/2017 0853    AST 21 01/18/2017 0853    ALT 30 01/18/2017 0853    BILITOT 0.8 01/18/2017 0853    ESTGFRAFRICA >60 01/18/2017 0853    EGFRNONAA >60 01/18/2017 0853        Lab Results   Component Value Date    CHOL 199 01/18/2017    CHOL 224 (H) 01/05/2016    CHOL 150 07/07/2015     Lab Results   Component Value Date    HDL 38 (L) 01/18/2017    HDL 37 (L) 01/05/2016    HDL 22 (L) 07/07/2015     Lab Results   Component Value Date    LDLCALC 133.0 01/18/2017    LDLCALC 161.6 (H) 01/05/2016    LDLCALC 83.0 07/07/2015     Lab Results   Component Value Date    TRIG 140 01/18/2017    TRIG 127 01/05/2016    TRIG 225 (H) 07/07/2015     Lab Results   Component Value Date    CHOLHDL 19.1 (L) 01/18/2017    CHOLHDL 16.5 (L) 01/05/2016    CHOLHDL 14.7 (L) 07/07/2015         Review of old imaging:  n/a      ASSESSMENT:  Problem List Items Addressed This Visit     Wears glasses    Relevant Orders    Ambulatory Referral to Optometry    Prediabetes    Hypertension    Relevant Medications    lisinopril-hydrochlorothiazide (PRINZIDE,ZESTORETIC) 10-12.5 mg per tablet    amlodipine (NORVASC) 5 MG tablet    Brain mass - Primary      Other Visit Diagnoses     Encounter for vision screening        Relevant Orders    Ambulatory Referral to Optometry          ICD-10-CM ICD-9-CM   1. Brain mass G93.9 348.9   2. Essential hypertension I10 401.9   3. Prediabetes R73.03 790.29   4. Wears glasses Z97.3 V49.89   5. Encounter for vision screening Z01.00 V72.0               PLAN:     brain mass is improving    The current medical regimen  is effective;  continue present plan and medications.  B/p is good    Get him to [facility] CAR[facility] for this    Medication List with Changes/Refills   Current Medications    FISH OIL-OMEGA-3 FATTY ACIDS 300-1,000 MG CAPSULE    Take 2 g by mouth once daily.    SAW PALMETTO 80 MG CAPSULE    Take 80 mg by mouth once daily.   Changed and/or Refilled Medications    Modified Medication Previous Medication    AMLODIPINE (NORVASC) 5 MG TABLET amlodipine (NORVASC) 5 MG tablet       Take 1 tablet (5 mg total) by mouth once daily.    Take 1 tablet (5 mg total) by mouth once daily.    LISINOPRIL-HYDROCHLOROTHIAZIDE (PRINZIDE,ZESTORETIC) 10-12.5 MG PER TABLET lisinopril-hydrochlorothiazide (PRINZIDE,ZESTORETIC) 10-12.5 mg per tablet       Take 1 tablet by mouth once daily.    Take 1 tablet by mouth once daily.   Discontinued Medications    LACTOBACILLUS COMBO NO.6 (PROBIOTIC COMPLEX ORAL)    Take by mouth once daily.       Return in about 3 months (around 10/7/2017) for assess treatment plan.

## 2017-08-02 ENCOUNTER — OFFICE VISIT (OUTPATIENT)
Dept: OPTOMETRY | Facility: CLINIC | Age: 57
End: 2017-08-02
Payer: COMMERCIAL

## 2017-08-02 DIAGNOSIS — H49.21 SIXTH NERVE PALSY OF RIGHT EYE: Primary | ICD-10-CM

## 2017-08-02 DIAGNOSIS — H52.4 HYPEROPIA WITH PRESBYOPIA, BILATERAL: ICD-10-CM

## 2017-08-02 DIAGNOSIS — H52.03 HYPEROPIA WITH PRESBYOPIA, BILATERAL: ICD-10-CM

## 2017-08-02 DIAGNOSIS — G93.89 BRAIN MASS: ICD-10-CM

## 2017-08-02 PROCEDURE — 92014 COMPRE OPH EXAM EST PT 1/>: CPT | Mod: S$GLB,,, | Performed by: OPTOMETRIST

## 2017-08-02 PROCEDURE — 99999 PR PBB SHADOW E&M-EST. PATIENT-LVL I: CPT | Mod: PBBFAC,,, | Performed by: OPTOMETRIST

## 2017-08-02 NOTE — LETTER
August 2, 2017      Reddy Kong MD  [address]  [place] [address]           [facility]  [address] [facility] [address]  [place] [address]  Phone: [phone]  Fax: [phone]          Patient: Blas Alcocer   MR Number: [id number]   YOB: 1960   Date of Visit: 8/2/2017       Dear Dr. Reddy Kong:    Thank you for referring Blas Alcocer to me for evaluation. Attached you will find relevant portions of my assessment and plan of care.    If you have questions, please do not hesitate to call me. I look forward to following Blas Alcocer along with you.    Sincerely,    Pamela Grady, OD    Enclosure  CC:  No Recipients    If you would like to receive this communication electronically, please contact [email or web address] or [phone] to request more information on [employer] Link access.    For providers and/or their staff who would like to refer a patient to [facility], please contact us through our one-stop-shop provider referral line, [facility], at [phone].    If you feel you have received this communication in error or would no longer like to receive these types of communications, please e-mail [email or web address]

## 2017-08-02 NOTE — PROGRESS NOTES
"Subjective:       Patient ID: Blas Alcocer is a 57 y.o. male      Chief Complaint   Patient presents with    Concerns About Ocular Health     History of Present Illness  Dls: 1/10/17 Dr. Macdonald    History of meningioma and sixth nerve palsy, s/p Gamma Knife Radiosurgery February 2017. Pt reports improvement in his diplopia and 6th nerve palsy. Pt reports he has been doing "eye exercises" to improve his muscle function since his procedure.     Pt wears pal's. Pt would like updated Rx today. Pt states no tearing no itching no burning no pain no ha's no floaters.     Eye meds:   None     Assessment/Plan:     1. Sixth nerve palsy of right eye  2. Brain mass  S/p gamma knife radiation 02/2017. His meningioma is decreasing in size and pt reports improvement in diplopia and 6th nerve palsy since procedure. Followed q6m by neurology with MRI scans. Pt was Rx fresnel prisms by Dr. Macdonald but did not have them filled. Pt states he will try them out. Pt to follow up with Dr. Macdonald.    3. Hyperopia with presbyopia, bilateral  Educated patient on refractive error and discussed lens options. Dispensed updated spectacle Rx. Educated about adaptation period to new specs.    Eyeglass Final Rx     Eyeglass Final Rx       Sphere Cylinder Add    Right +0.75 Sphere +2.25    Left +0.50 Sphere +2.25    Type:  PAL    Expiration Date:  8/3/2018                  Return for follow up with Dr. Macdonald as scheduled.       "

## 2017-08-17 ENCOUNTER — OFFICE VISIT (OUTPATIENT)
Dept: OPHTHALMOLOGY | Facility: CLINIC | Age: 57
End: 2017-08-17
Payer: COMMERCIAL

## 2017-08-17 DIAGNOSIS — H49.21 SIXTH NERVE PALSY OF RIGHT EYE: Primary | ICD-10-CM

## 2017-08-17 DIAGNOSIS — G93.89 BRAIN MASS: ICD-10-CM

## 2017-08-17 PROCEDURE — 92012 INTRM OPH EXAM EST PATIENT: CPT | Mod: S$GLB,,, | Performed by: OPHTHALMOLOGY

## 2017-08-17 PROCEDURE — 99999 PR PBB SHADOW E&M-EST. PATIENT-LVL II: CPT | Mod: PBBFAC,,, | Performed by: OPHTHALMOLOGY

## 2017-08-17 PROCEDURE — 92060 SENSORIMOTOR EXAMINATION: CPT | Mod: S$GLB,,, | Performed by: OPHTHALMOLOGY

## 2017-08-17 NOTE — PROGRESS NOTES
"HPI     DLS 08/02/17  By Dr. Pamela CARSON OD    56 Y/O M here today for 6th NP OD ck. Pt states" when I hold my head back   the diplopia goes away. Pt has a history of meningioma and sixth nerve   palsy, s/p Gamma Knife Radiosurgery February 2017. Pt reports improvement   in his diplopia and 6th nerve palsy. Pt reports he has been doing "eye   exercises" to improve his muscle function since his procedure. [facility]     Eye Meds : None    POHx:   1. 6th NP OD  2. Brain Mass  S/P Gamma Knife Radiation procedure 02/2017 by Dr. Robert MD  3. Hyperopia w/ presbyopia OU    Last edited by Maryam Carreon MA on 8/17/2017 10:48 AM. (History)        ROS     Positive for: Neurological, Eyes    Last edited by HODAN Macdonald Jr., MD on 8/17/2017 11:03 AM. (History)          Assessment /Plan     For exam results, see Encounter Report.    Sixth nerve palsy of right eye    Brain mass      Improvement in ET since Gamma Knife Radiosurgery in February 2017  Consider prism to control ET.  Fuses with 18 PD   Gave RX to fill if warranted   RTC 6 months after tumor resolves     This service was scribed by Eloise Ngo for, and in the presence of Dr Macdonald who personally performed this service.    Eloise Ngo, COA    Rukhsana Macdonald MD              "

## 2017-09-18 ENCOUNTER — PATIENT MESSAGE (OUTPATIENT)
Dept: ADMINISTRATIVE | Facility: OTHER | Age: 57
End: 2017-09-18

## 2017-12-07 ENCOUNTER — PATIENT MESSAGE (OUTPATIENT)
Dept: FAMILY MEDICINE | Facility: CLINIC | Age: 57
End: 2017-12-07

## 2017-12-19 ENCOUNTER — HOSPITAL ENCOUNTER (OUTPATIENT)
Dept: RADIOLOGY | Facility: HOSPITAL | Age: 57
Discharge: HOME | End: 2017-12-19
Attending: NEUROLOGICAL SURGERY
Payer: COMMERCIAL

## 2017-12-19 ENCOUNTER — OFFICE VISIT (OUTPATIENT)
Dept: NEUROSURGERY | Facility: CLINIC | Age: 57
End: 2017-12-19
Payer: COMMERCIAL

## 2017-12-19 VITALS
SYSTOLIC BLOOD PRESSURE: 129 MMHG | WEIGHT: 200.38 LBS | DIASTOLIC BLOOD PRESSURE: 94 MMHG | BODY MASS INDEX: 29.59 KG/M2 | HEART RATE: 74 BPM

## 2017-12-19 DIAGNOSIS — G93.89 BRAIN MASS: ICD-10-CM

## 2017-12-19 DIAGNOSIS — D32.9 MENINGIOMA: ICD-10-CM

## 2017-12-19 DIAGNOSIS — H49.21 SIXTH NERVE PALSY OF RIGHT EYE: Primary | ICD-10-CM

## 2017-12-19 DIAGNOSIS — H49.21 SIXTH NERVE PALSY OF RIGHT EYE: ICD-10-CM

## 2017-12-19 PROCEDURE — 99213 OFFICE O/P EST LOW 20 MIN: CPT | Mod: S$GLB,,, | Performed by: NEUROLOGICAL SURGERY

## 2017-12-19 PROCEDURE — 99999 PR PBB SHADOW E&M-EST. PATIENT-LVL III: CPT | Mod: PBBFAC,,, | Performed by: NEUROLOGICAL SURGERY

## 2017-12-19 PROCEDURE — 70553 MRI BRAIN STEM W/O & W/DYE: CPT | Mod: TC

## 2017-12-19 PROCEDURE — A9585 GADOBUTROL INJECTION: HCPCS | Performed by: NEUROLOGICAL SURGERY

## 2017-12-19 PROCEDURE — 25500020 PHARM REV CODE 255: Performed by: NEUROLOGICAL SURGERY

## 2017-12-19 PROCEDURE — 70553 MRI BRAIN STEM W/O & W/DYE: CPT | Mod: 26,,, | Performed by: RADIOLOGY

## 2017-12-19 RX ORDER — GADOBUTROL 604.72 MG/ML
9 INJECTION INTRAVENOUS
Status: COMPLETED | OUTPATIENT
Start: 2017-12-19 | End: 2017-12-19

## 2017-12-19 RX ADMIN — GADOBUTROL 9 ML: 604.72 INJECTION INTRAVENOUS at 01:12

## 2017-12-19 NOTE — PATIENT INSTRUCTIONS
I have reviewed the patient's MRI brain, which shows stable enhancing extra-axial lesion centered in the right prepontine cistern (8 mm).     I recommend the patient get prisms' prescription filled for his glasses.     I will schedule the patient a 1 year FU with MRI brain.     If the patient experiences any new/ worsening symptoms the patient should contact us.

## 2017-12-19 NOTE — PROGRESS NOTES
Subjective:    I, Yue Zee, am scribing for, and in the presence of, Dr. Henrique Jones.     Patient ID: Blas Alcocer is a 57 y.o. male.    Chief Complaint: Follow-up    HPI   Pt is a 58 yo male with brain mass who presents today for 6 month FU with MRI. Pt is s/p Gamma Knife Radiosurgery. Pt states he is doing well without new complications. Pt states his energy level is normal and he has stable vision since last office visit.    Review of Systems   Constitutional: Negative for chills and fever.   HENT: Negative.    Eyes: Negative.    Respiratory: Negative.    Cardiovascular: Negative.    Gastrointestinal: Negative.    Endocrine: Negative.    Genitourinary: Negative.    Musculoskeletal: Negative.    Skin: Negative.    Allergic/Immunologic: Negative.    Neurological: Negative for tremors, weakness, light-headedness, numbness and headaches.   Hematological: Negative.    Psychiatric/Behavioral: Negative.        Past Medical History:   Diagnosis Date    Dyslipidemia (high LDL; low HDL) 6/13    History of elevated prostate specific antigen (PSA)     Hypertension     Hypovitaminosis D 6/13    Prediabetes 6/13    Sixth nerve palsy of right eye 12/6/2016       Objective:     BP (!) 129/94   Pulse 74   Wt 90.9 kg (200 lb 6.4 oz)   BMI 29.59 kg/m²     Physical Exam   Constitutional: He is oriented to person, place, and time. He appears well-developed and well-nourished.   Eyes: Pupils are equal, round, and reactive to light.   + right eye palsy   Neurological: He is alert and oriented to person, place, and time. No cranial nerve deficit.       Imaging:  MRI Brain W WO Contrast 12/19/2017 shows stable enhancing extra-axial lesion centered in the right prepontine cistern (8 mm).     I have personally reviewed the images with the pt.      I, Dr. Henrique Jones, personally performed the services described in this documentation. All medical record entries made by the scribe were at my direction and in my presence.  I  have reviewed the chart and agree that the record reflects my personal performance and is accurate and complete. Henrique Jones MD.  3:09 PM 12/19/2017    Assessment:       1. Sixth nerve palsy of right eye    2. Meningioma        Plan:   I have reviewed the patient's MRI brain, which shows stable enhancing extra-axial lesion centered in the right prepontine cistern (8 mm).     I recommend the patient get prisms' prescription filled for his glasses.     I will schedule the patient a 1 year FU with MRI brain.     If the patient experiences any new/ worsening symptoms the patient should contact us.

## 2018-01-10 ENCOUNTER — OFFICE VISIT (OUTPATIENT)
Dept: FAMILY MEDICINE | Facility: CLINIC | Age: 58
End: 2018-01-10
Payer: COMMERCIAL

## 2018-01-10 VITALS
WEIGHT: 200.63 LBS | SYSTOLIC BLOOD PRESSURE: 148 MMHG | HEIGHT: 69 IN | BODY MASS INDEX: 29.71 KG/M2 | HEART RATE: 82 BPM | OXYGEN SATURATION: 96 % | DIASTOLIC BLOOD PRESSURE: 80 MMHG | TEMPERATURE: 98 F

## 2018-01-10 DIAGNOSIS — I10 ESSENTIAL HYPERTENSION: ICD-10-CM

## 2018-01-10 DIAGNOSIS — Z00.00 ANNUAL PHYSICAL EXAM: Primary | ICD-10-CM

## 2018-01-10 DIAGNOSIS — R73.03 PREDIABETES: ICD-10-CM

## 2018-01-10 PROCEDURE — 99396 PREV VISIT EST AGE 40-64: CPT | Mod: S$GLB,,, | Performed by: FAMILY MEDICINE

## 2018-01-10 PROCEDURE — 99999 PR PBB SHADOW E&M-EST. PATIENT-LVL III: CPT | Mod: PBBFAC,,, | Performed by: FAMILY MEDICINE

## 2018-01-10 NOTE — PROGRESS NOTES
"Chief Complaint   Patient presents with    Diabetes     SUBJECTIVE:   Blas Alcocer is a 57 y.o. male presenting for his annual checkup.  Current Outpatient Prescriptions   Medication Sig Dispense Refill    amlodipine (NORVASC) 5 MG tablet Take 1 tablet (5 mg total) by mouth once daily. 90 tablet 3    fish oil-omega-3 fatty acids 300-1,000 mg capsule Take 2 g by mouth once daily.      lisinopril-hydrochlorothiazide (PRINZIDE,ZESTORETIC) 10-12.5 mg per tablet Take 1 tablet by mouth once daily. 90 tablet 3    saw palmetto 80 MG capsule Take 80 mg by mouth once daily.       No current facility-administered medications for this visit.      Allergies: Patient has no known allergies.     ROS:  Feeling well. No dyspnea or chest pain on exertion. No abdominal pain, change in bowel habits, black or bloody stools. No urinary tract or prostatic symptoms. No neurological complaints.    OBJECTIVE:   The patient appears well, alert, oriented x 3, in no distress.   BP (!) 148/80   Pulse 82   Temp 97.6 °F (36.4 °C) (Oral)   Ht 5' 9" (1.753 m)   Wt 91 kg (200 lb 9.9 oz)   SpO2 96%   BMI 29.63 kg/m²      Wt Readings from Last 15 Encounters:   01/10/18 91 kg (200 lb 9.9 oz)   12/19/17 90.9 kg (200 lb 6.4 oz)   07/07/17 88 kg (194 lb 0.1 oz)   06/14/17 88 kg (193 lb 14.4 oz)   03/15/17 89.4 kg (197 lb)   02/01/17 89.4 kg (197 lb 1.5 oz)   01/18/17 89.4 kg (197 lb)   01/17/17 89.6 kg (197 lb 8.5 oz)   12/20/16 87.6 kg (193 lb 2 oz)   10/05/16 88.4 kg (194 lb 14.2 oz)   04/15/16 87 kg (191 lb 12.8 oz)   01/04/16 87.6 kg (193 lb 2 oz)   09/30/15 86.6 kg (191 lb)   07/10/15 89.4 kg (197 lb)   06/19/15 90.3 kg (199 lb)       ENT normal.  Neck supple. No adenopathy or thyromegaly. KEVIN. Lungs are clear, good air entry, no wheezes, rhonchi or rales. S1 and S2 normal, no murmurs, regular rate and rhythm. Abdomen is soft without tenderness, guarding, mass or organomegaly.  exam: deferred.  Extremities show no edema, normal " peripheral pulses. Neurological is normal without focal findings.    ASSESSMENT:   1. Annual physical exam    2. Essential hypertension    3. Prediabetes        PLAN:   Bals was seen today for diabetes.    Diagnoses and all orders for this visit:    Annual physical exam  -     CBC auto differential; Standing  -     Comprehensive metabolic panel; Standing  -     Hemoglobin A1c; Standing  -     Lipid panel; Standing  -     Testosterone Panel; Future  -     TSH; Standing  -     Urinalysis; Standing  -     Uric acid; Future  Counseled on age appropriate medical preventative services, including age appropriate cancer screenings, over all nutritional health, need for a consistent exercise regimen and an over all push towards maintaining a vigorous and active lifestyle.  Counseled on age appropriate vaccines and discussed upcoming health care needs based on age/gender.  Spent time with patient counseling on need for a good patient/doctor relationship moving forward.  Discussed use of common OTC medications and supplements.  Discussed common dietary aids and use of caffeine and the need for good sleep hygiene and stress management.    Essential hypertension  The current medical regimen is effective;  continue present plan and medications.    Prediabetes  Explained to patient that this is a complex metabolic process, related to genetics in the form of family history of this disease.  The other key factors are exposure to a poor diet high in simple carbs/sugars that is very prevalent in [place] and also a lack of exercise with poor weight control that leads to the changes that eventually becomes diabetes.  Explained to patient that in certain cases we can try to reverse these processes and prolong time to diabetes, or completely reverse the process.  Explained that the best chance of stopping the progression is a Diabetes Prevention Program, with or without Metformin.  Explained that we will refer the patient to local [facility]  that offers this program and encouraged patient to consider it.  If patient decides not to do this, they are counseled to let us know so we can initiate medical treatment.

## 2018-01-12 ENCOUNTER — LAB VISIT (OUTPATIENT)
Dept: LAB | Facility: HOSPITAL | Age: 58
End: 2018-01-12
Attending: FAMILY MEDICINE
Payer: COMMERCIAL

## 2018-01-12 DIAGNOSIS — Z00.00 ANNUAL PHYSICAL EXAM: ICD-10-CM

## 2018-01-12 LAB
ALBUMIN SERPL BCP-MCNC: 3.9 G/DL
ALP SERPL-CCNC: 55 U/L
ALT SERPL W/O P-5'-P-CCNC: 27 U/L
ANION GAP SERPL CALC-SCNC: 8 MMOL/L
AST SERPL-CCNC: 17 U/L
BASOPHILS # BLD AUTO: 0.06 K/UL
BASOPHILS NFR BLD: 0.8 %
BILIRUB SERPL-MCNC: 1 MG/DL
BUN SERPL-MCNC: 16 MG/DL
CALCIUM SERPL-MCNC: 9.5 MG/DL
CHLORIDE SERPL-SCNC: 103 MMOL/L
CHOLEST SERPL-MCNC: 214 MG/DL
CHOLEST/HDLC SERPL: 5 {RATIO}
CO2 SERPL-SCNC: 27 MMOL/L
CREAT SERPL-MCNC: 1 MG/DL
DIFFERENTIAL METHOD: ABNORMAL
EOSINOPHIL # BLD AUTO: 0 K/UL
EOSINOPHIL NFR BLD: 0.6 %
ERYTHROCYTE [DISTWIDTH] IN BLOOD BY AUTOMATED COUNT: 14.5 %
EST. GFR  (AFRICAN AMERICAN): >60 ML/MIN/1.73 M^2
EST. GFR  (NON AFRICAN AMERICAN): >60 ML/MIN/1.73 M^2
ESTIMATED AVG GLUCOSE: 114 MG/DL
GLUCOSE SERPL-MCNC: 119 MG/DL
HBA1C MFR BLD HPLC: 5.6 %
HCT VFR BLD AUTO: 45.5 %
HDLC SERPL-MCNC: 43 MG/DL
HDLC SERPL: 20.1 %
HGB BLD-MCNC: 16.4 G/DL
LDLC SERPL CALC-MCNC: 149.4 MG/DL
LYMPHOCYTES # BLD AUTO: 2.3 K/UL
LYMPHOCYTES NFR BLD: 31.8 %
MCH RBC QN AUTO: 28.5 PG
MCHC RBC AUTO-ENTMCNC: 36 G/DL
MCV RBC AUTO: 79 FL
MONOCYTES # BLD AUTO: 0.8 K/UL
MONOCYTES NFR BLD: 10.7 %
NEUTROPHILS # BLD AUTO: 4.1 K/UL
NEUTROPHILS NFR BLD: 56.8 %
NONHDLC SERPL-MCNC: 171 MG/DL
PLATELET # BLD AUTO: 239 K/UL
PMV BLD AUTO: 10.4 FL
POTASSIUM SERPL-SCNC: 4.5 MMOL/L
PROT SERPL-MCNC: 7 G/DL
RBC # BLD AUTO: 5.76 M/UL
SODIUM SERPL-SCNC: 138 MMOL/L
TRIGL SERPL-MCNC: 108 MG/DL
TSH SERPL DL<=0.005 MIU/L-ACNC: 0.44 UIU/ML
URATE SERPL-MCNC: 6 MG/DL
WBC # BLD AUTO: 7.23 K/UL

## 2018-01-12 PROCEDURE — 84550 ASSAY OF BLOOD/URIC ACID: CPT

## 2018-01-12 PROCEDURE — 83036 HEMOGLOBIN GLYCOSYLATED A1C: CPT

## 2018-01-12 PROCEDURE — 85025 COMPLETE CBC W/AUTO DIFF WBC: CPT

## 2018-01-12 PROCEDURE — 80061 LIPID PANEL: CPT

## 2018-01-12 PROCEDURE — 80053 COMPREHEN METABOLIC PANEL: CPT

## 2018-01-12 PROCEDURE — 82040 ASSAY OF SERUM ALBUMIN: CPT

## 2018-01-12 PROCEDURE — 84443 ASSAY THYROID STIM HORMONE: CPT

## 2018-01-12 PROCEDURE — 36415 COLL VENOUS BLD VENIPUNCTURE: CPT | Mod: PO

## 2018-01-15 LAB
ALBUMIN SERPL-MCNC: 4.3 G/DL (ref 3.6–5.1)
SHBG SERPL-SCNC: 37 NMOL/L (ref 22–77)
TESTOST FREE SERPL-MCNC: 63.9 PG/ML (ref 46–224)
TESTOST SERPL-MCNC: 513 NG/DL (ref 250–1100)
TESTOSTERONE.FREE+WB SERPL-MCNC: 125.8 NG/DL (ref 110–575)

## 2018-04-25 ENCOUNTER — PATIENT MESSAGE (OUTPATIENT)
Dept: FAMILY MEDICINE | Facility: CLINIC | Age: 58
End: 2018-04-25

## 2018-07-02 ENCOUNTER — PATIENT MESSAGE (OUTPATIENT)
Dept: FAMILY MEDICINE | Facility: CLINIC | Age: 58
End: 2018-07-02

## 2018-08-02 ENCOUNTER — PATIENT MESSAGE (OUTPATIENT)
Dept: FAMILY MEDICINE | Facility: CLINIC | Age: 58
End: 2018-08-02

## 2018-08-02 ENCOUNTER — OFFICE VISIT (OUTPATIENT)
Dept: FAMILY MEDICINE | Facility: CLINIC | Age: 58
End: 2018-08-02
Payer: COMMERCIAL

## 2018-08-02 ENCOUNTER — LAB VISIT (OUTPATIENT)
Dept: LAB | Facility: HOSPITAL | Age: 58
End: 2018-08-02
Attending: FAMILY MEDICINE
Payer: COMMERCIAL

## 2018-08-02 VITALS
SYSTOLIC BLOOD PRESSURE: 120 MMHG | WEIGHT: 191.81 LBS | OXYGEN SATURATION: 97 % | DIASTOLIC BLOOD PRESSURE: 80 MMHG | HEIGHT: 69 IN | HEART RATE: 75 BPM | TEMPERATURE: 98 F | BODY MASS INDEX: 28.41 KG/M2

## 2018-08-02 DIAGNOSIS — I10 ESSENTIAL HYPERTENSION: ICD-10-CM

## 2018-08-02 DIAGNOSIS — E78.1 HYPERTRIGLYCERIDEMIA: ICD-10-CM

## 2018-08-02 DIAGNOSIS — E78.1 HYPERTRIGLYCERIDEMIA: Primary | ICD-10-CM

## 2018-08-02 LAB
ALBUMIN SERPL BCP-MCNC: 4.2 G/DL
ALP SERPL-CCNC: 55 U/L
ALT SERPL W/O P-5'-P-CCNC: 19 U/L
ANION GAP SERPL CALC-SCNC: 11 MMOL/L
AST SERPL-CCNC: 19 U/L
BILIRUB SERPL-MCNC: 1.1 MG/DL
BUN SERPL-MCNC: 14 MG/DL
CALCIUM SERPL-MCNC: 9.9 MG/DL
CHLORIDE SERPL-SCNC: 104 MMOL/L
CHOLEST SERPL-MCNC: 195 MG/DL
CHOLEST/HDLC SERPL: 4.1 {RATIO}
CO2 SERPL-SCNC: 24 MMOL/L
CREAT SERPL-MCNC: 1.1 MG/DL
EST. GFR  (AFRICAN AMERICAN): >60 ML/MIN/1.73 M^2
EST. GFR  (NON AFRICAN AMERICAN): >60 ML/MIN/1.73 M^2
GLUCOSE SERPL-MCNC: 109 MG/DL
HDLC SERPL-MCNC: 47 MG/DL
HDLC SERPL: 24.1 %
LDLC SERPL CALC-MCNC: 134.2 MG/DL
NONHDLC SERPL-MCNC: 148 MG/DL
POTASSIUM SERPL-SCNC: 4.3 MMOL/L
PROT SERPL-MCNC: 7.4 G/DL
SODIUM SERPL-SCNC: 139 MMOL/L
TRIGL SERPL-MCNC: 69 MG/DL

## 2018-08-02 PROCEDURE — 99999 PR PBB SHADOW E&M-EST. PATIENT-LVL III: CPT | Mod: PBBFAC,,, | Performed by: FAMILY MEDICINE

## 2018-08-02 PROCEDURE — 80061 LIPID PANEL: CPT

## 2018-08-02 PROCEDURE — 99213 OFFICE O/P EST LOW 20 MIN: CPT | Mod: S$GLB,,, | Performed by: FAMILY MEDICINE

## 2018-08-02 PROCEDURE — 36415 COLL VENOUS BLD VENIPUNCTURE: CPT | Mod: PO

## 2018-08-02 PROCEDURE — 80053 COMPREHEN METABOLIC PANEL: CPT

## 2018-08-02 RX ORDER — LISINOPRIL AND HYDROCHLOROTHIAZIDE 10; 12.5 MG/1; MG/1
1 TABLET ORAL DAILY
Qty: 90 TABLET | Refills: 3 | Status: SHIPPED | OUTPATIENT
Start: 2018-08-02 | End: 2019-02-15 | Stop reason: SDUPTHER

## 2018-08-02 RX ORDER — AMLODIPINE BESYLATE 5 MG/1
5 TABLET ORAL DAILY
Qty: 90 TABLET | Refills: 3 | Status: SHIPPED | OUTPATIENT
Start: 2018-08-02 | End: 2019-08-05 | Stop reason: SDUPTHER

## 2018-08-02 NOTE — PROGRESS NOTES
"Chief Complaint   Patient presents with    Hypertension       SUBJECTIVE:  Blas Alcocer is a 58 y.o. male here for follow up of HTN and HLD and with better lifestyle change.  Currently has co-morbidities including per problem list.      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 No lumps or pain in the testicles.  The patient is taking hypertensive medications compliantly without side effects.  Denies chest pain, dyspnea, edema, or TIA's.    OBJECTIVE:  /80   Pulse 75   Temp 97.6 °F (36.4 °C) (Oral)   Ht 5' 9" (1.753 m)   Wt 87 kg (191 lb 12.8 oz)   SpO2 97%   BMI 28.32 kg/m²     Wt Readings from Last 3 Encounters:   08/02/18 87 kg (191 lb 12.8 oz)   01/10/18 91 kg (200 lb 9.9 oz)   12/19/17 90.9 kg (200 lb 6.4 oz)     BP Readings from Last 3 Encounters:   08/02/18 120/80   01/10/18 (!) 148/80   12/19/17 (!) 129/94       He appears well, in no apparent distress.  Alert and oriented times three, pleasant and cooperative. Vital signs are as documented in vital signs section.  Fundi are normal without hypertensive retinopathy. Chest is clear. Heart sounds normal, no gallop or murmur. No hepatosplenomegaly or pedal edema.      Review of old Records:  Reviewed per epic and [facility]    Review of old labs:    Lab Results   Component Value Date    TSH 0.437 01/12/2018     Lab Results   Component Value Date    WBC 7.23 01/12/2018    HGB 16.4 01/12/2018    HCT 45.5 01/12/2018    MCV 79 (L) 01/12/2018     01/12/2018       Chemistry      "   Component Value Date/Time     01/12/2018 0905    K 4.5 01/12/2018 0905     01/12/2018 0905    CO2 27 01/12/2018 0905    BUN 16 01/12/2018 0905    CREATININE 1.0 01/12/2018 0905     (H) 01/12/2018 0905        Component Value Date/Time    CALCIUM 9.5 01/12/2018 0905    ALKPHOS 55 01/12/2018 0905    AST 17 01/12/2018 0905    ALT 27 01/12/2018 0905    BILITOT 1.0 01/12/2018 0905    ESTGFRAFRICA >60 01/12/2018 0905    EGFRNONAA >60 01/12/2018 0905        Lab Results   Component Value Date    CHOL 214 (H) 01/12/2018    CHOL 199 01/18/2017    CHOL 224 (H) 01/05/2016     Lab Results   Component Value Date    HDL 43 01/12/2018    HDL 38 (L) 01/18/2017    HDL 37 (L) 01/05/2016     Lab Results   Component Value Date    LDLCALC 149.4 01/12/2018    LDLCALC 133.0 01/18/2017    LDLCALC 161.6 (H) 01/05/2016     Lab Results   Component Value Date    TRIG 108 01/12/2018    TRIG 140 01/18/2017    TRIG 127 01/05/2016     Lab Results   Component Value Date    CHOLHDL 20.1 01/12/2018    CHOLHDL 19.1 (L) 01/18/2017    CHOLHDL 16.5 (L) 01/05/2016         Review of old imaging:  n/a      ASSESSMENT:  Problem List Items Addressed This Visit     Hypertriglyceridemia - Primary    Relevant Orders    Comprehensive metabolic panel    Lipid panel    Hypertension    Relevant Medications    lisinopril-hydrochlorothiazide (PRINZIDE,ZESTORETIC) 10-12.5 mg per tablet    amLODIPine (NORVASC) 5 MG tablet    Other Relevant Orders    Comprehensive metabolic panel    Lipid panel          ICD-10-CM ICD-9-CM   1. Hypertriglyceridemia E78.1 272.1   2. Essential hypertension I10 401.9               PLAN:     Blas was seen today for hypertension.    Diagnoses and all orders for this visit:    Hypertriglyceridemia  -     Comprehensive metabolic panel; Future  -     Lipid panel; Future    Essential hypertension  -     lisinopril-hydrochlorothiazide (PRINZIDE,ZESTORETIC) 10-12.5 mg per tablet; Take 1 tablet by mouth once daily.  -      amLODIPine (NORVASC) 5 MG tablet; Take 1 tablet (5 mg total) by mouth once daily.  -     Comprehensive metabolic panel; Future  -     Lipid panel; Future      The current medical regimen is effective;  continue present plan and medications.    Medication List with Changes/Refills   Current Medications    FISH OIL-OMEGA-3 FATTY ACIDS 300-1,000 MG CAPSULE    Take 2 g by mouth once daily.    SAW PALMETTO 80 MG CAPSULE    Take 80 mg by mouth once daily.   Changed and/or Refilled Medications    Modified Medication Previous Medication    AMLODIPINE (NORVASC) 5 MG TABLET amlodipine (NORVASC) 5 MG tablet       Take 1 tablet (5 mg total) by mouth once daily.    Take 1 tablet (5 mg total) by mouth once daily.    LISINOPRIL-HYDROCHLOROTHIAZIDE (PRINZIDE,ZESTORETIC) 10-12.5 MG PER TABLET lisinopril-hydrochlorothiazide (PRINZIDE,ZESTORETIC) 10-12.5 mg per tablet       Take 1 tablet by mouth once daily.    Take 1 tablet by mouth once daily.       Follow-up in about 6 months (around 2/2/2019) for wellness exam.

## 2018-08-23 ENCOUNTER — TELEPHONE (OUTPATIENT)
Dept: FAMILY MEDICINE | Facility: CLINIC | Age: 58
End: 2018-08-23

## 2018-08-23 NOTE — TELEPHONE ENCOUNTER
----- Message from Reddy Kong MD sent at 8/2/2018 10:03 AM CDT -----  Schedule 6 month recall, thank you!

## 2018-12-08 ENCOUNTER — OFFICE VISIT (OUTPATIENT)
Dept: FAMILY MEDICINE | Facility: CLINIC | Age: 58
End: 2018-12-08
Payer: COMMERCIAL

## 2018-12-08 VITALS
DIASTOLIC BLOOD PRESSURE: 88 MMHG | OXYGEN SATURATION: 97 % | HEIGHT: 69 IN | BODY MASS INDEX: 27.6 KG/M2 | TEMPERATURE: 98 F | HEART RATE: 64 BPM | WEIGHT: 186.31 LBS | SYSTOLIC BLOOD PRESSURE: 132 MMHG

## 2018-12-08 DIAGNOSIS — Z00.00 ANNUAL PHYSICAL EXAM: Primary | ICD-10-CM

## 2018-12-08 DIAGNOSIS — I10 ESSENTIAL HYPERTENSION: ICD-10-CM

## 2018-12-08 PROCEDURE — 99396 PREV VISIT EST AGE 40-64: CPT | Mod: S$GLB,,, | Performed by: FAMILY MEDICINE

## 2018-12-08 PROCEDURE — 99999 PR PBB SHADOW E&M-EST. PATIENT-LVL III: CPT | Mod: PBBFAC,,, | Performed by: FAMILY MEDICINE

## 2018-12-08 NOTE — PROGRESS NOTES
"Chief Complaint   Patient presents with    Annual Exam     no comcerns     SUBJECTIVE:   Blas Alcocer is a 58 y.o. male presenting for his annual checkup.  Current Outpatient Medications   Medication Sig Dispense Refill    amLODIPine (NORVASC) 5 MG tablet Take 1 tablet (5 mg total) by mouth once daily. 90 tablet 3    fish oil-omega-3 fatty acids 300-1,000 mg capsule Take 2 g by mouth once daily.      lisinopril-hydrochlorothiazide (PRINZIDE,ZESTORETIC) 10-12.5 mg per tablet Take 1 tablet by mouth once daily. 90 tablet 3    saw palmetto 80 MG capsule Take 80 mg by mouth once daily.       No current facility-administered medications for this visit.      Allergies: Patient has no known allergies.     ROS:  Feeling well. No dyspnea or chest pain on exertion. No abdominal pain, change in bowel habits, black or bloody stools. No urinary tract or prostatic symptoms. No neurological complaints.    OBJECTIVE:   The patient appears well, alert, oriented x 3, in no distress.   BP (!) 132/90   Pulse 64   Temp 98.3 °F (36.8 °C) (Oral)   Ht 5' 9" (1.753 m)   Wt 84.5 kg (186 lb 4.6 oz)   SpO2 97%   BMI 27.51 kg/m²   ENT normal.  Neck supple. No adenopathy or thyromegaly. KEVIN. Lungs are clear, good air entry, no wheezes, rhonchi or rales. S1 and S2 normal, no murmurs, regular rate and rhythm. Abdomen is soft without tenderness, guarding, mass or organomegaly.  exam: deferred.  Extremities show no edema, normal peripheral pulses. Neurological is normal without focal findings.    ASSESSMENT:   1. Annual physical exam    2. Essential hypertension          PLAN:   Counseled on age appropriate medical preventative services, including age appropriate cancer screenings, over all nutritional health, need for a consistent exercise regimen and an over all push towards maintaining a vigorous and active lifestyle.  Counseled on age appropriate vaccines and discussed upcoming health care needs based on age/gender.  Spent time " with patient counseling on need for a good patient/doctor relationship moving forward.  Discussed use of common OTC medications and supplements.  Discussed common dietary aids and use of caffeine and the need for good sleep hygiene and stress management.    Leena to go get him a flu shot at the pharmacy or another clinic.    We will get him enrolled in the digital hypertension program.

## 2019-01-10 ENCOUNTER — TELEPHONE (OUTPATIENT)
Dept: FAMILY MEDICINE | Facility: CLINIC | Age: 59
End: 2019-01-10

## 2019-02-04 ENCOUNTER — PATIENT MESSAGE (OUTPATIENT)
Dept: FAMILY MEDICINE | Facility: CLINIC | Age: 59
End: 2019-02-04

## 2019-02-13 ENCOUNTER — PATIENT MESSAGE (OUTPATIENT)
Dept: OPHTHALMOLOGY | Facility: CLINIC | Age: 59
End: 2019-02-13

## 2019-02-14 ENCOUNTER — OFFICE VISIT (OUTPATIENT)
Dept: OPHTHALMOLOGY | Facility: CLINIC | Age: 59
End: 2019-02-14
Payer: COMMERCIAL

## 2019-02-14 ENCOUNTER — TELEPHONE (OUTPATIENT)
Dept: OPHTHALMOLOGY | Facility: CLINIC | Age: 59
End: 2019-02-14

## 2019-02-14 DIAGNOSIS — H50.00 ESOTROPIA: Primary | ICD-10-CM

## 2019-02-14 PROCEDURE — 99999 PR PBB SHADOW E&M-EST. PATIENT-LVL II: CPT | Mod: PBBFAC,,, | Performed by: OPHTHALMOLOGY

## 2019-02-14 PROCEDURE — 99999 PR PBB SHADOW E&M-EST. PATIENT-LVL II: ICD-10-PCS | Mod: PBBFAC,,, | Performed by: OPHTHALMOLOGY

## 2019-02-14 PROCEDURE — 92012 INTRM OPH EXAM EST PATIENT: CPT | Mod: S$GLB,,, | Performed by: OPHTHALMOLOGY

## 2019-02-14 PROCEDURE — 92060 SENSORIMOTOR EXAMINATION: CPT | Mod: S$GLB,,, | Performed by: OPHTHALMOLOGY

## 2019-02-14 PROCEDURE — 92012 PR EYE EXAM, EST PATIENT,INTERMED: ICD-10-PCS | Mod: S$GLB,,, | Performed by: OPHTHALMOLOGY

## 2019-02-14 PROCEDURE — 92060 PR SPECIAL EYE EVAL,SENSORIMOTOR: ICD-10-PCS | Mod: S$GLB,,, | Performed by: OPHTHALMOLOGY

## 2019-02-14 NOTE — TELEPHONE ENCOUNTER
Blas Alcocer H. Sprague Jr., MD Yesterday (11:41 AM)         Someone called to confirm my appointment date.  Unfortunately, I did not post into my calendar as I was waiting for a mail reminder.  Please schedule me for any Thursday or Friday.          02/14/19   PT was seen today by Dr. Macdonald for f/u with prism glasses. stj

## 2019-02-14 NOTE — PROGRESS NOTES
HPI     DLS 08/17/2017 by Dr. Torres MD    56 Y/O M here today for 6th NP OD for f/u of prism glasses. Pt feels   comfortable with glasses and wishes to have prism grinded into glasses.   stj       Eye Meds : None    POHx:   1. 6th NP OD  2. Brain Mass  S/P Gamma Knife Radiation procedure 02/2017 by Dr. Robert MD  3. Hyperopia w/ presbyopia OU    Last edited by Maryam Carreon MA on 2/14/2019 12:52 PM. (History)            Assessment /Plan     For exam results, see Encounter Report.    Esotropia      Decrease in prism correction   Gave MRX with 5 PD ESTRELLITA OU   Can consider strabismus surgery     RTC PRN     This service was scribed by Eloise Ngo for, and in the presence of Dr Macdonald who personally performed this service.    Eloise Ngo, COA    Rukhsana Macdonald MD

## 2019-02-15 ENCOUNTER — OFFICE VISIT (OUTPATIENT)
Dept: FAMILY MEDICINE | Facility: CLINIC | Age: 59
End: 2019-02-15
Payer: COMMERCIAL

## 2019-02-15 ENCOUNTER — LAB VISIT (OUTPATIENT)
Dept: LAB | Facility: HOSPITAL | Age: 59
End: 2019-02-15
Attending: FAMILY MEDICINE
Payer: COMMERCIAL

## 2019-02-15 VITALS
BODY MASS INDEX: 28.31 KG/M2 | SYSTOLIC BLOOD PRESSURE: 122 MMHG | WEIGHT: 191.13 LBS | TEMPERATURE: 98 F | DIASTOLIC BLOOD PRESSURE: 88 MMHG | HEIGHT: 69 IN | HEART RATE: 76 BPM | OXYGEN SATURATION: 97 % | RESPIRATION RATE: 18 BRPM

## 2019-02-15 DIAGNOSIS — Z00.00 ANNUAL PHYSICAL EXAM: ICD-10-CM

## 2019-02-15 DIAGNOSIS — I10 ESSENTIAL HYPERTENSION: ICD-10-CM

## 2019-02-15 DIAGNOSIS — Z00.00 ANNUAL PHYSICAL EXAM: Primary | ICD-10-CM

## 2019-02-15 LAB
ALBUMIN SERPL BCP-MCNC: 4.1 G/DL
ALP SERPL-CCNC: 51 U/L
ALT SERPL W/O P-5'-P-CCNC: 21 U/L
ANION GAP SERPL CALC-SCNC: 8 MMOL/L
AST SERPL-CCNC: 18 U/L
BASOPHILS # BLD AUTO: 0.04 K/UL
BASOPHILS NFR BLD: 0.6 %
BILIRUB SERPL-MCNC: 0.5 MG/DL
BUN SERPL-MCNC: 17 MG/DL
CALCIUM SERPL-MCNC: 9.8 MG/DL
CHLORIDE SERPL-SCNC: 104 MMOL/L
CHOLEST SERPL-MCNC: 189 MG/DL
CHOLEST/HDLC SERPL: 4 {RATIO}
CO2 SERPL-SCNC: 26 MMOL/L
COMPLEXED PSA SERPL-MCNC: 3.2 NG/ML
CREAT SERPL-MCNC: 0.9 MG/DL
DIFFERENTIAL METHOD: NORMAL
EOSINOPHIL # BLD AUTO: 0.1 K/UL
EOSINOPHIL NFR BLD: 1.2 %
ERYTHROCYTE [DISTWIDTH] IN BLOOD BY AUTOMATED COUNT: 14.4 %
EST. GFR  (AFRICAN AMERICAN): >60 ML/MIN/1.73 M^2
EST. GFR  (NON AFRICAN AMERICAN): >60 ML/MIN/1.73 M^2
ESTIMATED AVG GLUCOSE: 117 MG/DL
GLUCOSE SERPL-MCNC: 105 MG/DL
HBA1C MFR BLD HPLC: 5.7 %
HCT VFR BLD AUTO: 47.8 %
HDLC SERPL-MCNC: 47 MG/DL
HDLC SERPL: 24.9 %
HGB BLD-MCNC: 16.4 G/DL
LDLC SERPL CALC-MCNC: 118 MG/DL
LYMPHOCYTES # BLD AUTO: 2.1 K/UL
LYMPHOCYTES NFR BLD: 28.7 %
MCH RBC QN AUTO: 28.4 PG
MCHC RBC AUTO-ENTMCNC: 34.3 G/DL
MCV RBC AUTO: 83 FL
MONOCYTES # BLD AUTO: 0.7 K/UL
MONOCYTES NFR BLD: 10.1 %
NEUTROPHILS # BLD AUTO: 4.3 K/UL
NEUTROPHILS NFR BLD: 59.4 %
NONHDLC SERPL-MCNC: 142 MG/DL
PLATELET # BLD AUTO: 256 K/UL
PMV BLD AUTO: 10.3 FL
POTASSIUM SERPL-SCNC: 4.1 MMOL/L
PROT SERPL-MCNC: 7 G/DL
RBC # BLD AUTO: 5.78 M/UL
SODIUM SERPL-SCNC: 138 MMOL/L
T4 FREE SERPL-MCNC: 1 NG/DL
TRIGL SERPL-MCNC: 120 MG/DL
TSH SERPL DL<=0.005 MIU/L-ACNC: 0.35 UIU/ML
URATE SERPL-MCNC: 6.1 MG/DL
WBC # BLD AUTO: 7.26 K/UL

## 2019-02-15 PROCEDURE — 99999 PR PBB SHADOW E&M-EST. PATIENT-LVL III: ICD-10-PCS | Mod: PBBFAC,,, | Performed by: FAMILY MEDICINE

## 2019-02-15 PROCEDURE — 36415 COLL VENOUS BLD VENIPUNCTURE: CPT | Mod: PO

## 2019-02-15 PROCEDURE — 84439 ASSAY OF FREE THYROXINE: CPT

## 2019-02-15 PROCEDURE — 80061 LIPID PANEL: CPT

## 2019-02-15 PROCEDURE — 99396 PR PREVENTIVE VISIT,EST,40-64: ICD-10-PCS | Mod: S$GLB,,, | Performed by: FAMILY MEDICINE

## 2019-02-15 PROCEDURE — 84550 ASSAY OF BLOOD/URIC ACID: CPT

## 2019-02-15 PROCEDURE — 83036 HEMOGLOBIN GLYCOSYLATED A1C: CPT

## 2019-02-15 PROCEDURE — 99999 PR PBB SHADOW E&M-EST. PATIENT-LVL III: CPT | Mod: PBBFAC,,, | Performed by: FAMILY MEDICINE

## 2019-02-15 PROCEDURE — 99396 PREV VISIT EST AGE 40-64: CPT | Mod: S$GLB,,, | Performed by: FAMILY MEDICINE

## 2019-02-15 PROCEDURE — 80053 COMPREHEN METABOLIC PANEL: CPT

## 2019-02-15 PROCEDURE — 85025 COMPLETE CBC W/AUTO DIFF WBC: CPT

## 2019-02-15 PROCEDURE — 84153 ASSAY OF PSA TOTAL: CPT

## 2019-02-15 PROCEDURE — 84443 ASSAY THYROID STIM HORMONE: CPT

## 2019-02-15 RX ORDER — VALSARTAN AND HYDROCHLOROTHIAZIDE 80; 12.5 MG/1; MG/1
1 TABLET, FILM COATED ORAL DAILY
Qty: 90 TABLET | Refills: 3 | Status: SHIPPED | OUTPATIENT
Start: 2019-02-15 | End: 2020-02-24

## 2019-02-15 NOTE — PROGRESS NOTES
"Chief Complaint   Patient presents with    Orders     blood work     SUBJECTIVE:   Blas Alcocer is a 58 y.o. male presenting for his annual checkup.  Current Outpatient Medications   Medication Sig Dispense Refill    amLODIPine (NORVASC) 5 MG tablet Take 1 tablet (5 mg total) by mouth once daily. 90 tablet 3    fish oil-omega-3 fatty acids 300-1,000 mg capsule Take 2 g by mouth once daily.      saw palmetto 80 MG capsule Take 80 mg by mouth once daily.      valsartan-hydrochlorothiazide (DIOVAN-HCT) 80-12.5 mg per tablet Take 1 tablet by mouth once daily. 90 tablet 3     No current facility-administered medications for this visit.      Allergies: Patient has no known allergies.     ROS:  Feeling well. No dyspnea or chest pain on exertion. No abdominal pain, change in bowel habits, black or bloody stools. No urinary tract or prostatic symptoms. No neurological complaints.    OBJECTIVE:   The patient appears well, alert, oriented x 3, in no distress.   /88 (BP Location: Right arm, Patient Position: Sitting, BP Method: Large (Manual))   Pulse 76   Temp 98 °F (36.7 °C) (Oral)   Resp 18   Ht 5' 9" (1.753 m)   Wt 86.7 kg (191 lb 2.2 oz)   SpO2 97%   BMI 28.23 kg/m²      Wt Readings from Last 15 Encounters:   02/15/19 86.7 kg (191 lb 2.2 oz)   12/08/18 84.5 kg (186 lb 4.6 oz)   08/02/18 87 kg (191 lb 12.8 oz)   01/10/18 91 kg (200 lb 9.9 oz)   12/19/17 90.9 kg (200 lb 6.4 oz)   07/07/17 88 kg (194 lb 0.1 oz)   06/14/17 88 kg (193 lb 14.4 oz)   03/15/17 89.4 kg (197 lb)   02/01/17 89.4 kg (197 lb 1.5 oz)   01/18/17 89.4 kg (197 lb)   01/17/17 89.6 kg (197 lb 8.5 oz)   12/20/16 87.6 kg (193 lb 2 oz)   10/05/16 88.4 kg (194 lb 14.2 oz)   04/15/16 87 kg (191 lb 12.8 oz)   01/04/16 87.6 kg (193 lb 2 oz)       ENT normal.  Neck supple. No adenopathy or thyromegaly. KEVIN. Lungs are clear, good air entry, no wheezes, rhonchi or rales. S1 and S2 normal, no murmurs, regular rate and rhythm. Abdomen is soft " without tenderness, guarding, mass or organomegaly.  exam: deferred.  Extremities show no edema, normal peripheral pulses. Neurological is normal without focal findings.    ASSESSMENT:   1. Annual physical exam    2. Essential hypertension        PLAN:   Counseled on age appropriate medical preventative services, including age appropriate cancer screenings, over all nutritional health, need for a consistent exercise regimen and an over all push towards maintaining a vigorous and active lifestyle.  Counseled on age appropriate vaccines and discussed upcoming health care needs based on age/gender.  Spent time with patient counseling on need for a good patient/doctor relationship moving forward.  Discussed use of common OTC medications and supplements.  Discussed common dietary aids and use of caffeine and the need for good sleep hygiene and stress management.

## 2019-02-18 ENCOUNTER — PATIENT MESSAGE (OUTPATIENT)
Dept: FAMILY MEDICINE | Facility: CLINIC | Age: 59
End: 2019-02-18

## 2019-02-25 ENCOUNTER — PATIENT MESSAGE (OUTPATIENT)
Dept: OPHTHALMOLOGY | Facility: CLINIC | Age: 59
End: 2019-02-25

## 2019-03-18 ENCOUNTER — PATIENT MESSAGE (OUTPATIENT)
Dept: FAMILY MEDICINE | Facility: CLINIC | Age: 59
End: 2019-03-18

## 2019-04-01 ENCOUNTER — PATIENT MESSAGE (OUTPATIENT)
Dept: ADMINISTRATIVE | Facility: OTHER | Age: 59
End: 2019-04-01

## 2019-04-09 ENCOUNTER — PATIENT OUTREACH (OUTPATIENT)
Dept: OTHER | Facility: OTHER | Age: 59
End: 2019-04-09

## 2019-04-09 NOTE — PROGRESS NOTES
Last 5 Patient Entered Readings                                      Current 30 Day Average: 124/90     Recent Readings 4/5/2019 4/5/2019    SBP (mmHg) 124 126    DBP (mmHg) 92 88    Pulse 80 76          Digital Medicine: Health  Introduction    Introduced Mr. Blas Alcocer to Digital Medicine. Discussed health  role and recommended lifestyle modifications.    Lifestyle Assessment:  Current Dietary Habits(i.e. low sodium, food labels, dining out):  Patient states that he is avoiding carbs and fried food. He states he goes out to eat around twice a week and will sometimes get fast food. He mentions that when he gets fast food, it is either breakfast or a salad from [employer]. I will be sending him a low sodium recommendation list from [employer] via email.     Exercise:  Patient states he walks for 90 minutes everyday when weather permits.    Alcohol/Tobacco:  Deferred.    Medication Adherence: has been compliant with the medicaiton regimen    Other goals:   None. His only worry is his DBP which we discussed sodium intake.     Reviewed AHA/AACE recommendations:  Limit sodium intake to <2000mg/day  Perform 150 minutes of physical activity per week    Reviewed the importance of self-monitoring, medication adherence, and that the health  can be used as a resource for lifestyle modifications to help reduce or maintain a healthy lifestyle.  Reviewed that the Digital Medicine team is not available for emergencies and instructed the patient to call 911 or [facility] ([phone] or [phone]) if one arises.

## 2019-04-22 ENCOUNTER — PATIENT MESSAGE (OUTPATIENT)
Dept: OTHER | Facility: OTHER | Age: 59
End: 2019-04-22

## 2019-04-24 ENCOUNTER — PATIENT MESSAGE (OUTPATIENT)
Dept: FAMILY MEDICINE | Facility: CLINIC | Age: 59
End: 2019-04-24

## 2019-04-24 ENCOUNTER — PATIENT MESSAGE (OUTPATIENT)
Dept: ADMINISTRATIVE | Facility: OTHER | Age: 59
End: 2019-04-24

## 2019-04-24 DIAGNOSIS — M79.671 HEEL PAIN, BILATERAL: Primary | ICD-10-CM

## 2019-04-24 DIAGNOSIS — M79.672 HEEL PAIN, BILATERAL: Primary | ICD-10-CM

## 2019-04-25 ENCOUNTER — PATIENT MESSAGE (OUTPATIENT)
Dept: FAMILY MEDICINE | Facility: CLINIC | Age: 59
End: 2019-04-25

## 2019-04-25 PROBLEM — M79.671 HEEL PAIN, BILATERAL: Status: ACTIVE | Noted: 2019-04-25

## 2019-04-25 PROBLEM — M79.672 HEEL PAIN, BILATERAL: Status: ACTIVE | Noted: 2019-04-25

## 2019-04-25 RX ORDER — IBUPROFEN 800 MG/1
800 TABLET ORAL EVERY 8 HOURS
Qty: 21 TABLET | Refills: 0 | Status: SHIPPED | OUTPATIENT
Start: 2019-04-25 | End: 2019-05-25

## 2019-06-13 ENCOUNTER — PATIENT OUTREACH (OUTPATIENT)
Dept: OTHER | Facility: OTHER | Age: 59
End: 2019-06-13

## 2019-06-13 NOTE — PROGRESS NOTES
Last 5 Patient Entered Readings                                      Current 30 Day Average: 118/81     Recent Readings 6/13/2019 6/6/2019 6/5/2019 5/31/2019 5/29/2019    SBP (mmHg) 107 123 115 124 105    DBP (mmHg) 77 87 74 86 79    Pulse 65 70 73 69 82          HPI:  Called patient to follow up. Patient endorses adherence to medication regimen. Patient denies hypotensive s/sx (lightheadedness, dizziness, nausea, fatigue); patient denies hypertensive s/sx (SOB, CP, severe headaches, changes in vision). Instructed patient to seek medical care if BP > 180/110 and is accompanied by hypertensive s/sx associated, patient confirms understanding.     Assessment:  Reviewed recent readings. Per 2017 ACC/ AHA HTN guidelines (goal of BP < 130/80), current 30-day average needs to be addressed more thoroughly today.     Plan:  - Patient is working on lifestyle changes with Jaye, will continue.  - Take Bps 2-3 times weekly, advised on proper BP measurement technique and charging cuff at least once a month. Had been taking meds after taking Bps.  Continue current medication regimen. I will continue to monitor regularly and will follow-up in 3-4 weeks, sooner if blood pressure begins to trend upward or downward.     Current medication regimen:  Hypertension Medications             amLODIPine (NORVASC) 5 MG tablet Take 1 tablet (5 mg total) by mouth once daily.    valsartan-hydrochlorothiazide (DIOVAN-HCT) 80-12.5 mg per tablet Take 1 tablet by mouth once daily.          Patient denies having questions or concerns. Patient has my contact information and knows to call with any concerns or clinical changes.

## 2019-06-18 ENCOUNTER — PATIENT OUTREACH (OUTPATIENT)
Dept: OTHER | Facility: OTHER | Age: 59
End: 2019-06-18

## 2019-06-26 NOTE — PROGRESS NOTES
Last 5 Patient Entered Readings                                      Current 30 Day Average: 119/80     Recent Readings 6/25/2019 6/18/2019 6/17/2019 6/15/2019 6/13/2019    SBP (mmHg) 119 123 122 119 107    DBP (mmHg) 85 81 85 71 77    Pulse 75 77 74 75 65            Digital Medicine: Health  Follow Up    Left voicemail to follow up with Toby Blas BARTLETT Carlyn.  Current BP average 119/80 mmHg is at goal, 130/80 mmHg.

## 2019-06-26 NOTE — PROGRESS NOTES
Last 5 Patient Entered Readings                                      Current 30 Day Average: 119/80     Recent Readings 6/25/2019 6/18/2019 6/17/2019 6/15/2019 6/13/2019    SBP (mmHg) 119 123 122 119 107    DBP (mmHg) 85 81 85 71 77    Pulse 75 77 74 75 65          Digital Medicine: Health  Follow Up    Lifestyle Modifications:    1.Dietary Modifications (Sodium intake <2,000mg/day, food labels, dining out):   Patient states he has completely cut out fast food. He is using [employer] as opposed to salt. He has noticed a huge difference and plans to continue cooking from home.     2.Physical Activity:   Patient is continuing to exercise everyday. He is walking for an hour in the mornings and doing 30 minutes of strength and flexibility in the afternoons. He is also playing golf once a week. Encouraged to continue. His main focus now is weight loss and would like to get to 170 lbs.    3.Medication Therapy: Patient has been compliant with the medication regimen.    4.Patient has the following medication side effects/concerns:    None.   (Frequency/Alleviating factors/Precipitating factors, etc.)     Follow up with Mr. Blas BARTLETT Carlyn completed. No further questions or concerns. Will continue to follow up to achieve health goals.

## 2019-07-11 NOTE — PROGRESS NOTES
Last 5 Patient Entered Readings                                      Current 30 Day Average: 118/80     Recent Readings 7/10/2019 7/2/2019 7/1/2019 6/25/2019 6/18/2019    SBP (mmHg) 113 111 132 119 123    DBP (mmHg) 79 82 82 85 81    Pulse 62 85 85 75 77        Chart Reviewed. Patient's current 30-day average is at goal of <130/80 mmHg per 2017 ACC/AHA HTN guidelines. No medication recommendations at this time. I will continue monitoring.

## 2019-07-17 ENCOUNTER — TELEPHONE (OUTPATIENT)
Dept: FAMILY MEDICINE | Facility: CLINIC | Age: 59
End: 2019-07-17

## 2019-07-17 DIAGNOSIS — R21 RASH: Primary | ICD-10-CM

## 2019-07-17 RX ORDER — TRIAMCINOLONE ACETONIDE 5 MG/G
CREAM TOPICAL 2 TIMES DAILY
Qty: 15 G | Refills: 0 | Status: SHIPPED | OUTPATIENT
Start: 2019-07-17 | End: 2019-12-02

## 2019-07-17 NOTE — TELEPHONE ENCOUNTER
----- Message from Kena Gonsalez sent at 7/17/2019 12:22 PM CDT -----  Contact: ARNOLDO BURK [[id number]]  Name of Who is Calling: ARNOLDO BURK [[id number]]      What is the request in detail: patient would like medication prescribed for a ant bite. Please call     Can the clinic reply by [facility]: no      [facility] [phone]

## 2019-08-05 DIAGNOSIS — I10 ESSENTIAL HYPERTENSION: ICD-10-CM

## 2019-08-05 RX ORDER — AMLODIPINE BESYLATE 5 MG/1
TABLET ORAL
Qty: 90 TABLET | Refills: 0 | Status: SHIPPED | OUTPATIENT
Start: 2019-08-05 | End: 2019-08-22 | Stop reason: SDUPTHER

## 2019-08-15 ENCOUNTER — PATIENT OUTREACH (OUTPATIENT)
Dept: OTHER | Facility: OTHER | Age: 59
End: 2019-08-15

## 2019-08-15 NOTE — PROGRESS NOTES
HPI:  Called Mr. Blas Alcocer for hypertension digital medicine follow-up. Patient reports adherence to medication regimen with no complaints. He continues to work on weight loss and is is pleased that BP is trending down.     Last 5 Patient Entered Readings                                      Current 30 Day Average: 120/85     Recent Readings 8/15/2019 8/14/2019 8/13/2019 8/8/2019 8/6/2019    SBP (mmHg) 104 111 113 111 114    DBP (mmHg) 81 82 84 83 80    Pulse 86 99 84 75 77          Patient denies s/s of hypotension (lightheadedness, dizziness, nausea, fatigue) associated with low readings. Instructed patient to inform me if this occurs, patient confirms understanding.    Patient denies s/s of hypertension (SOB, CP, severe headaches, changes in vision) associated with high readings. Instructed patient to go to the ED if BP >180/110 and accompanied by hypertensive s/s, patient confirms understanding.    Assessment:  Patient's current 30-day average is approaching goal of <130/80 mmHg.       Plan:  Continue current regimen.  Patients health , Nupur Nix, will follow-up as scheduled.    I will continue to monitor regularly and will follow-up in 8-10 weeks, sooner if blood pressure begins to trend upward or downward.     Current medication regimen:  Hypertension Medications             amLODIPine (NORVASC) 5 MG tablet TAKE ONE TABLET BY MOUTH ONCE DAILY FOR BLOOD PRESSURE.    valsartan-hydrochlorothiazide (DIOVAN-HCT) 80-12.5 mg per tablet Take 1 tablet by mouth once daily.        Patient has my contact information and knows to call with any concerns or clinical changes.

## 2019-08-22 DIAGNOSIS — I10 ESSENTIAL HYPERTENSION: ICD-10-CM

## 2019-08-23 RX ORDER — AMLODIPINE BESYLATE 5 MG/1
TABLET ORAL
Qty: 90 TABLET | Refills: 0 | Status: SHIPPED | OUTPATIENT
Start: 2019-08-23 | End: 2020-01-24

## 2019-08-27 ENCOUNTER — PATIENT MESSAGE (OUTPATIENT)
Dept: FAMILY MEDICINE | Facility: CLINIC | Age: 59
End: 2019-08-27

## 2019-09-04 ENCOUNTER — PATIENT MESSAGE (OUTPATIENT)
Dept: OTHER | Facility: OTHER | Age: 59
End: 2019-09-04

## 2019-09-04 ENCOUNTER — PATIENT OUTREACH (OUTPATIENT)
Dept: OTHER | Facility: OTHER | Age: 59
End: 2019-09-04

## 2019-09-04 NOTE — PROGRESS NOTES
Last 5 Patient Entered Readings                                      Current 30 Day Average: 117/83     Recent Readings 9/4/2019 8/30/2019 8/27/2019 8/23/2019 8/22/2019    SBP (mmHg) 120 134 112 118 111    DBP (mmHg) 80 87 83 81 85    Pulse 78 58 76 70 84        Digital Medicine: Health  Follow Up    Lifestyle Modifications:    1.Dietary Modifications (Sodium intake <2,000mg/day, food labels, dining out): Patient reports that he's trying to stay away from fast food. Patient sent me a [employer] message today asking about other ways to help lower his DBP. We discussed hydration and water intake and patient reports that he does keep a bottle of water on his desk (18oz) but that he normally doesn't refill it. Encouraged him to drink more water and gave him the recommendation of drink about half his body weight in ounces of water. Patient reports that he would work toward that goal.     2.Physical Activity: deferred    3.Medication Therapy: Patient has been compliant with the medication regimen.    4.Patient has the following medication side effects/concerns: none  (Frequency/Alleviating factors/Precipitating factors, etc.)     Follow up with Mr. Blas Alcocer completed. Mr. Alcocer is doing well. Introduced myself as his new HC. No further questions or concerns. Will continue to follow up to achieve health goals.

## 2019-09-24 ENCOUNTER — PATIENT MESSAGE (OUTPATIENT)
Dept: FAMILY MEDICINE | Facility: CLINIC | Age: 59
End: 2019-09-24

## 2019-10-02 ENCOUNTER — PATIENT OUTREACH (OUTPATIENT)
Dept: OTHER | Facility: OTHER | Age: 59
End: 2019-10-02

## 2019-10-09 ENCOUNTER — PATIENT MESSAGE (OUTPATIENT)
Dept: ADMINISTRATIVE | Facility: OTHER | Age: 59
End: 2019-10-09

## 2019-10-10 NOTE — PROGRESS NOTES
Digital Medicine: Health  Follow-Up    Mr. Odonnell reports that he's doing well today. He apologized for not being able to talk last week. He was very stressed last week, and he thinks it showed in his BP readings.     The history is provided by the patient.     Follow Up  Follow-up reason(s): reading review and routine education      Readings are trending up due to stress.    Routine Education Topics: eating patterns and physical activity  Patient was happy with his reading today at 123/77. He said that he notices that his BP is great if he keeps up his morning routine of taking his medication, walking, then taking his BP. He noted that yesterday he forgot to take his medication before his reading, resulting in a higher reading of 144/92.        Diet:       The patient drinks 72 ounces of water per day.    He has the following dietary restrictions: low sodium diet    Patient reports that he's attempting to increase his water intake. He was only drinking about 1 18oz bottle of water a day and now he's drinking about 4.     Physical Activity:   When asked if exercising, patient responded: yes    Patient participates in the following activities: walking    Patient is walking about 5 miles a day. He uses an thaddeus on his phone to track the calories burned, his time, and distance.     INTERVENTION(S)  encouragement/support    PLAN  patient verbalizes understanding      There are no preventive care reminders to display for this patient.    Last 5 Patient Entered Readings                                      Current 30 Day Average: 126/90     Recent Readings 10/10/2019 10/9/2019 10/4/2019 10/3/2019 9/23/2019    SBP (mmHg) 123 144 120 121 123    DBP (mmHg) 77 92 89 87 96    Pulse 84 70 69 77 72

## 2019-11-01 ENCOUNTER — IMMUNIZATION (OUTPATIENT)
Dept: FAMILY MEDICINE | Facility: CLINIC | Age: 59
End: 2019-11-01
Payer: COMMERCIAL

## 2019-11-01 DIAGNOSIS — Z23 NEEDS FLU SHOT: Primary | ICD-10-CM

## 2019-11-01 PROCEDURE — 90471 IMMUNIZATION ADMIN: CPT | Mod: S$GLB,,, | Performed by: FAMILY MEDICINE

## 2019-11-01 PROCEDURE — 90686 FLU VACCINE (QUAD) GREATER THAN OR EQUAL TO 3YO PRESERVATIVE FREE IM: ICD-10-PCS | Mod: S$GLB,,, | Performed by: FAMILY MEDICINE

## 2019-11-01 PROCEDURE — 90686 IIV4 VACC NO PRSV 0.5 ML IM: CPT | Mod: S$GLB,,, | Performed by: FAMILY MEDICINE

## 2019-11-01 PROCEDURE — 90471 FLU VACCINE (QUAD) GREATER THAN OR EQUAL TO 3YO PRESERVATIVE FREE IM: ICD-10-PCS | Mod: S$GLB,,, | Performed by: FAMILY MEDICINE

## 2019-11-21 ENCOUNTER — PATIENT OUTREACH (OUTPATIENT)
Dept: OTHER | Facility: OTHER | Age: 59
End: 2019-11-21

## 2019-11-21 NOTE — PROGRESS NOTES
Digital Medicine: Health  Follow-Up    Mr. Odonnell reports that he's doing well today.     The history is provided by the patient.     Follow Up  Follow-up reason(s): reading review and routine education      Routine Education Topics: eating patterns and physical activity      Patient is still concerned about elevated DBP. We discussed different lifestyle changes that may help bring those numbers down.       INTERVENTION(S)  recommended diet modifications and encouragement/support    PLAN  patient verbalizes understanding      There are no preventive care reminders to display for this patient.    Last 5 Patient Entered Readings                                      Current 30 Day Average: 125/92     Recent Readings 11/21/2019 11/19/2019 11/15/2019 11/14/2019 11/11/2019    SBP (mmHg) 121 120 133 115 128    DBP (mmHg) 90 94 93 88 95    Pulse 81 75 93 74 78            Diet Screening       The patient drinks 24 ounces of water per day.    He has the following dietary restrictions: low sodium diet    Patient reports that he's doing well with his diet. He's sticking to low sodium foods, and cutting out fast food.     We discussed water intake. Patient reports that he really only drinks about 3 8oz bottles of water a day, one at each meal. We discussed recommendations for water intake being at half your body weight in oz of water a day. Encouraged patient to gradually increase water intake. We discussed tips to help increase intake : getting a reusable water bottle, keeping water bottle with you at all times, setting up bottle on desk as a reminder to drink more water. Will continue to check in and see how this effects DBP.     Intervention(s): increasing water intake    Physical Activity Screening   When asked if exercising, patient responded: yes    Patient participates in the following activities: walking and weights    Patient is continuing to walk 5 miles every morning. He's also trying to incorporate weight lifting  in the evenings 3-4x/week. Patient started weight this week.

## 2019-12-02 ENCOUNTER — LAB VISIT (OUTPATIENT)
Dept: LAB | Facility: HOSPITAL | Age: 59
End: 2019-12-02
Attending: FAMILY MEDICINE
Payer: COMMERCIAL

## 2019-12-02 ENCOUNTER — OFFICE VISIT (OUTPATIENT)
Dept: FAMILY MEDICINE | Facility: CLINIC | Age: 59
End: 2019-12-02
Payer: COMMERCIAL

## 2019-12-02 VITALS
TEMPERATURE: 98 F | OXYGEN SATURATION: 97 % | HEART RATE: 75 BPM | HEIGHT: 69 IN | DIASTOLIC BLOOD PRESSURE: 76 MMHG | WEIGHT: 186.31 LBS | BODY MASS INDEX: 27.6 KG/M2 | SYSTOLIC BLOOD PRESSURE: 112 MMHG

## 2019-12-02 DIAGNOSIS — Z00.00 ANNUAL PHYSICAL EXAM: ICD-10-CM

## 2019-12-02 DIAGNOSIS — I10 ESSENTIAL HYPERTENSION: ICD-10-CM

## 2019-12-02 DIAGNOSIS — Z00.00 ANNUAL PHYSICAL EXAM: Primary | ICD-10-CM

## 2019-12-02 LAB
ALBUMIN SERPL BCP-MCNC: 4.3 G/DL (ref 3.5–5.2)
ALP SERPL-CCNC: 54 U/L (ref 55–135)
ALT SERPL W/O P-5'-P-CCNC: 24 U/L (ref 10–44)
ANION GAP SERPL CALC-SCNC: 5 MMOL/L (ref 8–16)
AST SERPL-CCNC: 17 U/L (ref 10–40)
BASOPHILS # BLD AUTO: 0.05 K/UL (ref 0–0.2)
BASOPHILS NFR BLD: 0.7 % (ref 0–1.9)
BILIRUB SERPL-MCNC: 1.1 MG/DL (ref 0.1–1)
BUN SERPL-MCNC: 16 MG/DL (ref 6–20)
CALCIUM SERPL-MCNC: 10.1 MG/DL (ref 8.7–10.5)
CHLORIDE SERPL-SCNC: 103 MMOL/L (ref 95–110)
CHOLEST SERPL-MCNC: 213 MG/DL (ref 120–199)
CHOLEST/HDLC SERPL: 4.4 {RATIO} (ref 2–5)
CO2 SERPL-SCNC: 31 MMOL/L (ref 23–29)
CREAT SERPL-MCNC: 1 MG/DL (ref 0.5–1.4)
DIFFERENTIAL METHOD: ABNORMAL
EOSINOPHIL # BLD AUTO: 0.1 K/UL (ref 0–0.5)
EOSINOPHIL NFR BLD: 0.7 % (ref 0–8)
ERYTHROCYTE [DISTWIDTH] IN BLOOD BY AUTOMATED COUNT: 14 % (ref 11.5–14.5)
EST. GFR  (AFRICAN AMERICAN): >60 ML/MIN/1.73 M^2
EST. GFR  (NON AFRICAN AMERICAN): >60 ML/MIN/1.73 M^2
ESTIMATED AVG GLUCOSE: 117 MG/DL (ref 68–131)
GLUCOSE SERPL-MCNC: 121 MG/DL (ref 70–110)
HBA1C MFR BLD HPLC: 5.7 % (ref 4–5.6)
HCT VFR BLD AUTO: 47.9 % (ref 40–54)
HDLC SERPL-MCNC: 48 MG/DL (ref 40–75)
HDLC SERPL: 22.5 % (ref 20–50)
HGB BLD-MCNC: 16.4 G/DL (ref 14–18)
IMM GRANULOCYTES # BLD AUTO: 0.04 K/UL (ref 0–0.04)
IMM GRANULOCYTES NFR BLD AUTO: 0.6 % (ref 0–0.5)
LDLC SERPL CALC-MCNC: 146 MG/DL (ref 63–159)
LYMPHOCYTES # BLD AUTO: 1.6 K/UL (ref 1–4.8)
LYMPHOCYTES NFR BLD: 22.9 % (ref 18–48)
MCH RBC QN AUTO: 27.9 PG (ref 27–31)
MCHC RBC AUTO-ENTMCNC: 34.2 G/DL (ref 32–36)
MCV RBC AUTO: 82 FL (ref 82–98)
MONOCYTES # BLD AUTO: 0.8 K/UL (ref 0.3–1)
MONOCYTES NFR BLD: 11.5 % (ref 4–15)
NEUTROPHILS # BLD AUTO: 4.4 K/UL (ref 1.8–7.7)
NEUTROPHILS NFR BLD: 63.6 % (ref 38–73)
NONHDLC SERPL-MCNC: 165 MG/DL
NRBC BLD-RTO: 0 /100 WBC
PLATELET # BLD AUTO: 247 K/UL (ref 150–350)
PMV BLD AUTO: 10.4 FL (ref 9.2–12.9)
POTASSIUM SERPL-SCNC: 4.2 MMOL/L (ref 3.5–5.1)
PROT SERPL-MCNC: 7.2 G/DL (ref 6–8.4)
RBC # BLD AUTO: 5.87 M/UL (ref 4.6–6.2)
SODIUM SERPL-SCNC: 139 MMOL/L (ref 136–145)
T4 FREE SERPL-MCNC: 0.89 NG/DL (ref 0.71–1.51)
TRIGL SERPL-MCNC: 95 MG/DL (ref 30–150)
TSH SERPL DL<=0.005 MIU/L-ACNC: 0.34 UIU/ML (ref 0.4–4)
URATE SERPL-MCNC: 5.6 MG/DL (ref 3.4–7)
WBC # BLD AUTO: 6.94 K/UL (ref 3.9–12.7)

## 2019-12-02 PROCEDURE — 84550 ASSAY OF BLOOD/URIC ACID: CPT

## 2019-12-02 PROCEDURE — 83036 HEMOGLOBIN GLYCOSYLATED A1C: CPT

## 2019-12-02 PROCEDURE — 99999 PR PBB SHADOW E&M-EST. PATIENT-LVL III: CPT | Mod: PBBFAC,,, | Performed by: FAMILY MEDICINE

## 2019-12-02 PROCEDURE — 99999 PR PBB SHADOW E&M-EST. PATIENT-LVL III: ICD-10-PCS | Mod: PBBFAC,,, | Performed by: FAMILY MEDICINE

## 2019-12-02 PROCEDURE — 85025 COMPLETE CBC W/AUTO DIFF WBC: CPT

## 2019-12-02 PROCEDURE — 80061 LIPID PANEL: CPT

## 2019-12-02 PROCEDURE — 80053 COMPREHEN METABOLIC PANEL: CPT

## 2019-12-02 PROCEDURE — 99396 PREV VISIT EST AGE 40-64: CPT | Mod: S$GLB,,, | Performed by: FAMILY MEDICINE

## 2019-12-02 PROCEDURE — 84439 ASSAY OF FREE THYROXINE: CPT

## 2019-12-02 PROCEDURE — 84443 ASSAY THYROID STIM HORMONE: CPT

## 2019-12-02 PROCEDURE — 99396 PR PREVENTIVE VISIT,EST,40-64: ICD-10-PCS | Mod: S$GLB,,, | Performed by: FAMILY MEDICINE

## 2019-12-02 NOTE — PROGRESS NOTES
"Chief Complaint   Patient presents with    Annual Exam     SUBJECTIVE:   Blas Alcocer is a 59 y.o. male presenting for his annual checkup.  Current Outpatient Medications   Medication Sig Dispense Refill    amLODIPine (NORVASC) 5 MG tablet TAKE ONE TABLET BY MOUTH ONCE DAILY FOR BLOOD PRESSURE. 90 tablet 0    fish oil-omega-3 fatty acids 300-1,000 mg capsule Take 2 g by mouth once daily.      saw palmetto 80 MG capsule Take 80 mg by mouth once daily.      valsartan-hydrochlorothiazide (DIOVAN-HCT) 80-12.5 mg per tablet Take 1 tablet by mouth once daily. 90 tablet 3    triamcinolone acetonide 0.5% (KENALOG) 0.5 % Crea Apply topically 2 (two) times daily. (Patient not taking: Reported on 12/2/2019) 15 g 0     No current facility-administered medications for this visit.      Allergies: Patient has no known allergies.     ROS:  Feeling well. No dyspnea or chest pain on exertion. No abdominal pain, change in bowel habits, black or bloody stools. No urinary tract or prostatic symptoms. No neurological complaints.  Bloat and fever.    OBJECTIVE:   The patient appears well, alert, oriented x 3, in no distress.   /76   Pulse 75   Temp 97.5 °F (36.4 °C) (Oral)   Ht 5' 9" (1.753 m)   Wt 84.5 kg (186 lb 4.6 oz)   SpO2 97%   BMI 27.51 kg/m²      Wt Readings from Last 15 Encounters:   12/02/19 84.5 kg (186 lb 4.6 oz)   02/15/19 86.7 kg (191 lb 2.2 oz)   12/08/18 84.5 kg (186 lb 4.6 oz)   08/02/18 87 kg (191 lb 12.8 oz)   01/10/18 91 kg (200 lb 9.9 oz)   12/19/17 90.9 kg (200 lb 6.4 oz)   07/07/17 88 kg (194 lb 0.1 oz)   06/14/17 88 kg (193 lb 14.4 oz)   03/15/17 89.4 kg (197 lb)   02/01/17 89.4 kg (197 lb 1.5 oz)   01/18/17 89.4 kg (197 lb)   01/17/17 89.6 kg (197 lb 8.5 oz)   12/20/16 87.6 kg (193 lb 2 oz)   10/05/16 88.4 kg (194 lb 14.2 oz)   04/15/16 87 kg (191 lb 12.8 oz)       ENT normal.  Neck supple. No adenopathy or thyromegaly. KEVIN. Lungs are clear, good air entry, no wheezes, rhonchi or rales. S1 " and S2 normal, no murmurs, regular rate and rhythm. Abdomen is soft without tenderness, guarding, mass or organomegaly.  exam: .  Extremities show no edema, normal peripheral pulses. Neurological is normal without focal findings.    ASSESSMENT:   1. Annual physical exam        PLAN:   Blas was seen today for annual exam.    Diagnoses and all orders for this visit:    Annual physical exam  -     CBC auto differential; Future  -     Comprehensive metabolic panel; Future  -     Hemoglobin A1c; Future  -     Lipid panel; Future  -     Urinalysis; Future  -     Uric acid; Future  -     TSH; Future      Counseled on age appropriate medical preventative services, including age appropriate cancer screenings, over all nutritional health, need for a consistent exercise regimen and an over all push towards maintaining a vigorous and active lifestyle.  Counseled on age appropriate vaccines and discussed upcoming health care needs based on age/gender.  Spent time with patient counseling on need for a good patient/doctor relationship moving forward.  Discussed use of common OTC medications and supplements.  Discussed common dietary aids and use of caffeine and the need for good sleep hygiene and stress management.    Work on fluids first then we will see about sleep.

## 2019-12-03 ENCOUNTER — PATIENT MESSAGE (OUTPATIENT)
Dept: FAMILY MEDICINE | Facility: CLINIC | Age: 59
End: 2019-12-03

## 2019-12-19 ENCOUNTER — PATIENT OUTREACH (OUTPATIENT)
Dept: OTHER | Facility: OTHER | Age: 59
End: 2019-12-19

## 2019-12-24 ENCOUNTER — PATIENT OUTREACH (OUTPATIENT)
Dept: OTHER | Facility: OTHER | Age: 59
End: 2019-12-24

## 2019-12-24 NOTE — PROGRESS NOTES
Digital Medicine: Clinician Follow-Up    Called patient for digital medicine follow-up. Says that he is doing well overall but, has noticed that he has gained 8 lbs back that he had lost. Says that he has been traveling and not exercising or watching what he eats as before. Currently taking valsartan-hct and amlodipine and denies missed doses.     The history is provided by the patient. No  was used.     Follow Up  Follow-up reason(s): reading review              Assessment:  Patient's current 30-day average of 125/87 mmHg is approaching goal of <130/80 mmHg.       Plan:  Continue current regimen.  Resume low sodium diet and exercise.   Patients health , Nupur Nix, will follow-up as scheduled.    I will continue to monitor regularly and will follow-up in 8-10 weeks, sooner if blood pressure begins to trend upward or downward.     Patient has my contact information and knows to call with any concerns or clinical changes.            There are no preventive care reminders to display for this patient.    Last 5 Patient Entered Readings                                      Current 30 Day Average: 125/87     Recent Readings 12/13/2019 12/12/2019 12/11/2019 12/2/2019 12/2/2019    SBP (mmHg) 124 116 119 132 112    DBP (mmHg) 78 87 96 94 78    Pulse 65 74 78 79 75             Hypertension Medications             amLODIPine (NORVASC) 5 MG tablet TAKE ONE TABLET BY MOUTH ONCE DAILY FOR BLOOD PRESSURE.    valsartan-hydrochlorothiazide (DIOVAN-HCT) 80-12.5 mg per tablet Take 1 tablet by mouth once daily.                 Screenings

## 2020-01-17 NOTE — PROGRESS NOTES
Digital Medicine: Health  Follow-Up    Mr. Odonnell reports that he's doing well. He admits that he fell off track of his diet at the end of December, but that he's doing much better now.     The history is provided by the patient.     Follow Up  Follow-up reason(s): reading review and routine education      Routine Education Topics: eating patterns        INTERVENTION(S)  recommended diet modifications, encouragement/support and DASH diet    PLAN  patient verbalizes understanding      There are no preventive care reminders to display for this patient.    Last 5 Patient Entered Readings                                      Current 30 Day Average: 123/88     Recent Readings 1/17/2020 1/15/2020 1/10/2020 1/7/2020 12/13/2019    SBP (mmHg) 117 120 117 136 124    DBP (mmHg) 86 90 81 93 78    Pulse 90 79 88 80 65            Diet Screening       Patient asked about what diet I thought would be best to follow. Gave he the basics of the DASH diet and explained the importance of increasing veggies, decreasing processed sugar, eating carbs in moderation, and eating lean meats. Patient asked for information to be sent to him via email.     Asked patient about water intake. He was up to about 3 8oz bottles of water a day. He reports that he's cut back on water intake as per advice of his PCP, Dr. Kong. He said that if he gets thirsty he will just eat a juicy piece of fruit.     Intervention(s): DASH diet

## 2020-01-24 DIAGNOSIS — I10 ESSENTIAL HYPERTENSION: ICD-10-CM

## 2020-01-24 RX ORDER — AMLODIPINE BESYLATE 5 MG/1
TABLET ORAL
Qty: 90 TABLET | Refills: 3 | Status: SHIPPED | OUTPATIENT
Start: 2020-01-24 | End: 2020-02-24

## 2020-02-24 ENCOUNTER — PATIENT OUTREACH (OUTPATIENT)
Dept: OTHER | Facility: OTHER | Age: 60
End: 2020-02-24

## 2020-02-24 DIAGNOSIS — I10 ESSENTIAL HYPERTENSION: ICD-10-CM

## 2020-02-24 RX ORDER — AMLODIPINE BESYLATE 5 MG/1
10 TABLET ORAL DAILY
Qty: 90 TABLET | Refills: 3
Start: 2020-02-24 | End: 2020-03-23 | Stop reason: SDUPTHER

## 2020-02-24 RX ORDER — VALSARTAN AND HYDROCHLOROTHIAZIDE 80; 12.5 MG/1; MG/1
TABLET, FILM COATED ORAL
Qty: 90 TABLET | Refills: 3 | Status: SHIPPED | OUTPATIENT
Start: 2020-02-24 | End: 2020-03-23 | Stop reason: SDUPTHER

## 2020-02-24 NOTE — PROGRESS NOTES
Digital Medicine: Clinician Follow-Up    Called patient for digital medicine follow-up. He is doing well overall, but says that he continues to struggle with weight loss and consistent exercise. He is working with his wife to increasing during Lent. Patient reports adherence to medication regimen and denies missed doses.     The history is provided by the patient. No  was used.     Follow Up  Follow-up reason(s): reading review      Readings are trending up due to lifestyle change.            Assessment:  Patient's current 30-day average is not at goal of <130/80 mmHg.       Plan:  Increase amlodipine to 10 mg and continue valsartan-hct 80-12.5 mg.   Patients health , Nupur Nix, will follow-up as scheduled.    I will continue to monitor regularly and will follow-up in 2 weeks, sooner if blood pressure begins to trend upward or downward.     Patient has my contact information and knows to call with any concerns or clinical changes.            There are no preventive care reminders to display for this patient.    Last 5 Patient Entered Readings                                      Current 30 Day Average: 132/91     Recent Readings 2/20/2020 2/13/2020 2/3/2020 2/3/2020 1/29/2020    SBP (mmHg) 141 124 123 140 136    DBP (mmHg) 90 88 85 96 95    Pulse 61 76 69 81 69           Hypertension Medications             amLODIPine (NORVASC) 5 MG tablet Take 2 tablets (10 mg total) by mouth once daily.    valsartan-hydrochlorothiazide (DIOVAN-HCT) 80-12.5 mg per tablet Take 1 tablet by mouth once daily.

## 2020-03-11 ENCOUNTER — PATIENT OUTREACH (OUTPATIENT)
Dept: OTHER | Facility: OTHER | Age: 60
End: 2020-03-11

## 2020-03-12 NOTE — PROGRESS NOTES
Digital Medicine: Clinician Follow-Up    Called patient for digital medicine follow-up. Mr. Alcocer's blood pressure is trending down since increasing amlodipine. Patient says that he is tolerating regimen well and denies having questions or concerns at this time.     The history is provided by the patient. No  was used.     Follow Up  Follow-up reason(s): reading review and medication change follow-up      Patient started new medication.    Is patient tolerating med change?:  Yes          Assessment:  Patient's current 30-day average is approaching goal of <130/80 mmHg.       Plan:  Continue current regimen.  Patients health , Nupur Nix, will follow-up as scheduled.    I will continue to monitor regularly and will follow-up in 4 weeks, sooner if blood pressure begins to trend upward or downward.      Patient has my contact information and knows to call with any concerns or clinical changes.            There are no preventive care reminders to display for this patient.    Last 5 Patient Entered Readings                                      Current 30 Day Average: 126/87     Recent Readings 3/11/2020 3/11/2020 3/6/2020 3/5/2020 3/4/2020    SBP (mmHg) 130 130 110 137 117    DBP (mmHg) 90 95 79 93 82    Pulse 75 79 84 56 76             Hypertension Medications             amLODIPine (NORVASC) 5 MG tablet Take 2 tablets (10 mg total) by mouth once daily.    valsartan-hydrochlorothiazide (DIOVAN-HCT) 80-12.5 mg per tablet TAKE ONE TABLET BY MOUTH ONCE DAILY FOR BLOOD PRESSURE                 Screenings

## 2020-03-23 ENCOUNTER — PATIENT MESSAGE (OUTPATIENT)
Dept: FAMILY MEDICINE | Facility: CLINIC | Age: 60
End: 2020-03-23

## 2020-03-23 DIAGNOSIS — I10 ESSENTIAL HYPERTENSION: ICD-10-CM

## 2020-03-23 RX ORDER — AMLODIPINE BESYLATE 5 MG/1
10 TABLET ORAL DAILY
Qty: 90 TABLET | Refills: 0
Start: 2020-03-23 | End: 2020-03-23 | Stop reason: SDUPTHER

## 2020-03-23 RX ORDER — VALSARTAN AND HYDROCHLOROTHIAZIDE 80; 12.5 MG/1; MG/1
1 TABLET, FILM COATED ORAL DAILY
Qty: 90 TABLET | Refills: 0 | Status: SHIPPED | OUTPATIENT
Start: 2020-03-23 | End: 2020-04-23 | Stop reason: SDUPTHER

## 2020-03-23 RX ORDER — AMLODIPINE BESYLATE 5 MG/1
10 TABLET ORAL DAILY
Qty: 90 TABLET | Refills: 0 | Status: SHIPPED | OUTPATIENT
Start: 2020-03-23 | End: 2020-05-12

## 2020-03-31 ENCOUNTER — PATIENT MESSAGE (OUTPATIENT)
Dept: ADMINISTRATIVE | Facility: OTHER | Age: 60
End: 2020-03-31

## 2020-04-07 DIAGNOSIS — R06.02 SOB (SHORTNESS OF BREATH): ICD-10-CM

## 2020-04-07 DIAGNOSIS — R05.9 COUGH: Primary | ICD-10-CM

## 2020-04-14 ENCOUNTER — PATIENT MESSAGE (OUTPATIENT)
Dept: FAMILY MEDICINE | Facility: CLINIC | Age: 60
End: 2020-04-14

## 2020-04-14 DIAGNOSIS — R05.9 COUGH: Primary | ICD-10-CM

## 2020-04-15 ENCOUNTER — LAB VISIT (OUTPATIENT)
Dept: INTERNAL MEDICINE | Facility: CLINIC | Age: 60
End: 2020-04-15
Payer: COMMERCIAL

## 2020-04-15 ENCOUNTER — PATIENT MESSAGE (OUTPATIENT)
Dept: FAMILY MEDICINE | Facility: CLINIC | Age: 60
End: 2020-04-15

## 2020-04-15 DIAGNOSIS — R05.9 COUGH: ICD-10-CM

## 2020-04-15 LAB — SARS-COV-2 RNA RESP QL NAA+PROBE: NOT DETECTED

## 2020-04-15 PROCEDURE — U0002 COVID-19 LAB TEST NON-CDC: HCPCS

## 2020-04-16 ENCOUNTER — PATIENT MESSAGE (OUTPATIENT)
Dept: FAMILY MEDICINE | Facility: CLINIC | Age: 60
End: 2020-04-16

## 2020-04-23 ENCOUNTER — TELEPHONE (OUTPATIENT)
Dept: FAMILY MEDICINE | Facility: CLINIC | Age: 60
End: 2020-04-23

## 2020-04-23 DIAGNOSIS — I10 ESSENTIAL HYPERTENSION: ICD-10-CM

## 2020-04-23 DIAGNOSIS — I10 HYPERTENSION, UNSPECIFIED TYPE: Primary | ICD-10-CM

## 2020-04-23 RX ORDER — LISINOPRIL AND HYDROCHLOROTHIAZIDE 12.5; 2 MG/1; MG/1
1 TABLET ORAL DAILY
Qty: 90 TABLET | Refills: 3 | Status: SHIPPED | OUTPATIENT
Start: 2020-04-23 | End: 2021-03-24

## 2020-04-23 RX ORDER — VALSARTAN AND HYDROCHLOROTHIAZIDE 80; 12.5 MG/1; MG/1
1 TABLET, FILM COATED ORAL DAILY
Qty: 90 TABLET | Refills: 0 | Status: SHIPPED | OUTPATIENT
Start: 2020-04-23 | End: 2020-04-23 | Stop reason: RX

## 2020-04-23 NOTE — TELEPHONE ENCOUNTER
----- Message from Sabina Ibanez sent at 4/23/2020 11:51 AM CDT -----  Contact: pharmacy  Type: Patient Call Back    Who called:Gina    What is the request in detail:pahrmacy can not get this med. Pt need something else called in.    Can the clinic reply by [facility]?no    Would the patient rather a call back or a response via [facility]? call    Best call back number:

## 2020-04-23 NOTE — TELEPHONE ENCOUNTER
Spoke with gill at [facility], they cannot get the (VALSARTAN/HCTZ) can you send in something else for patient

## 2020-04-23 NOTE — TELEPHONE ENCOUNTER
----- Message from Shania Jennings sent at 4/23/2020  4:28 PM CDT -----  Contact: [facility] pharmacy [phone]  Type: RX Refill Request    Who Called: [facility] pharmacy [phone]    Have you contacted your pharmacy    Refill or New Rx:valsartan-hydrochlorothiazide (DIOVAN-HCT) 80-12.5 mg per tablet - cannot fill this. On back order. Please call something else    RX Name and Strength:    How is the patient currently taking it? (ex. 1XDay):    Is this a 30 day or 90 day RX:    Preferred Pharmacy with phone number:    Local or Mail Order:    Ordering Provider:    Would the patient rather a call back or a response via [facility]?     Best Call Back Number:    Additional Information:

## 2020-04-24 ENCOUNTER — PATIENT MESSAGE (OUTPATIENT)
Dept: FAMILY MEDICINE | Facility: CLINIC | Age: 60
End: 2020-04-24

## 2020-05-04 ENCOUNTER — PATIENT OUTREACH (OUTPATIENT)
Dept: OTHER | Facility: OTHER | Age: 60
End: 2020-05-04

## 2020-05-04 NOTE — PROGRESS NOTES
Digital Medicine: Health  Follow-Up    Mr. Odonnell reports that he's doing well. He's happy with how his BP has been trending.     The history is provided by the patient.     Follow Up  Follow-up reason(s): reading review and routine education      Readings are trending down due to lifestyle change and medication adherence.    Routine Education Topics: weight management, eating patterns and physical activity    Patient is still working on weight loss. His goal is to get down to about 170lbs. Right now he's around 180-183lbs. He plans to keep working on diet and exercise.       INTERVENTION(S)  encouragement/support    PLAN  patient verbalizes understanding      There are no preventive care reminders to display for this patient.    Last 5 Patient Entered Readings                                      Current 30 Day Average: 115/81     Recent Readings 4/30/2020 4/28/2020 4/28/2020 4/23/2020 4/21/2020    SBP (mmHg) 108 105 138 128 125    DBP (mmHg) 76 82 90 83 89    Pulse 69 80 60 63 59            Diet Screening   Patient reports eating or drinking the following: fresh vegetablesHe has the following dietary restrictions: low sodium diet and DASH    Patient is happy with his diet. He's been trying to follow the DASH diet and is eating more veggies, better protein, and decreasing salt and portions.     Physical Activity Screening   When asked if exercising, patient responded: yes    Patient participates in the following activities: running and walking    Patient reports that he's been doing well with his PA. He moved from walking to jogging about 4 miles every day. He'd like to eventually have one of his days be a longer jog (6-7miles), and plans on slowly increasing 1/2 mile increments to get to that goal.

## 2020-05-12 DIAGNOSIS — I10 ESSENTIAL HYPERTENSION: ICD-10-CM

## 2020-05-12 RX ORDER — AMLODIPINE BESYLATE 5 MG/1
TABLET ORAL
Qty: 90 TABLET | Refills: 0 | Status: SHIPPED | OUTPATIENT
Start: 2020-05-12 | End: 2020-05-14 | Stop reason: SDUPTHER

## 2020-05-14 DIAGNOSIS — I10 ESSENTIAL HYPERTENSION: ICD-10-CM

## 2020-05-14 RX ORDER — AMLODIPINE BESYLATE 5 MG/1
TABLET ORAL
Qty: 90 TABLET | Refills: 3 | Status: SHIPPED | OUTPATIENT
Start: 2020-05-14 | End: 2020-09-30

## 2020-05-14 NOTE — TELEPHONE ENCOUNTER
Last Office Visit Info:   The patient's last visit with Reddy Kong MD was on 1/4/2016.    The patient's last visit in current department was on 12/2/2019.        Last CBC Results:   Lab Results   Component Value Date    WBC 6.94 12/02/2019    HGB 16.4 12/02/2019    HCT 47.9 12/02/2019     12/02/2019       Last CMP Results  Lab Results   Component Value Date     12/02/2019    K 4.2 12/02/2019     12/02/2019    CO2 31 (H) 12/02/2019    BUN 16 12/02/2019    CREATININE 1.0 12/02/2019    CALCIUM 10.1 12/02/2019    ALBUMIN 4.3 12/02/2019    AST 17 12/02/2019    ALT 24 12/02/2019       Last Lipids  Lab Results   Component Value Date    CHOL 213 (H) 12/02/2019    TRIG 95 12/02/2019    HDL 48 12/02/2019    LDLCALC 146.0 12/02/2019       Last A1C  Lab Results   Component Value Date    HGBA1C 5.7 (H) 12/02/2019       Last TSH  Lab Results   Component Value Date    TSH 0.341 (L) 12/02/2019         Current Med Refills  Medication List with Changes/Refills   Current Medications    AMLODIPINE (NORVASC) 5 MG TABLET    TAKE TWO TABLETS BY MOUTH EVERY DAY FOR BLOOD PRESSURE       Start Date: 5/12/2020 End Date: --    FISH OIL-OMEGA-3 FATTY ACIDS 300-1,000 MG CAPSULE    Take 2 g by mouth once daily.       Start Date: --        End Date: --    LISINOPRIL-HYDROCHLOROTHIAZIDE (PRINZIDE,ZESTORETIC) 20-12.5 MG PER TABLET    Take 1 tablet by mouth once daily.       Start Date: 4/23/2020 End Date: 4/23/2021    SAW PALMETTO 80 MG CAPSULE    Take 80 mg by mouth once daily.       Start Date: --        End Date: --

## 2020-06-16 ENCOUNTER — OFFICE VISIT (OUTPATIENT)
Dept: FAMILY MEDICINE | Facility: CLINIC | Age: 60
End: 2020-06-16
Payer: COMMERCIAL

## 2020-06-16 VITALS
HEIGHT: 69 IN | SYSTOLIC BLOOD PRESSURE: 106 MMHG | DIASTOLIC BLOOD PRESSURE: 81 MMHG | BODY MASS INDEX: 25.48 KG/M2 | WEIGHT: 172 LBS

## 2020-06-16 DIAGNOSIS — R73.03 PREDIABETES: ICD-10-CM

## 2020-06-16 DIAGNOSIS — E78.1 HYPERTRIGLYCERIDEMIA: ICD-10-CM

## 2020-06-16 DIAGNOSIS — I10 ESSENTIAL HYPERTENSION: Primary | ICD-10-CM

## 2020-06-16 PROCEDURE — 99214 OFFICE O/P EST MOD 30 MIN: CPT | Mod: 95,,, | Performed by: FAMILY MEDICINE

## 2020-06-16 PROCEDURE — 99214 PR OFFICE/OUTPT VISIT, EST, LEVL IV, 30-39 MIN: ICD-10-PCS | Mod: 95,,, | Performed by: FAMILY MEDICINE

## 2020-06-16 NOTE — PROGRESS NOTES
HISTORY OF PRESENT ILLNESS:  Blas Alcocer is a 59 y.o. male who presents to the clinic today for Follow-up  .     The patient is taking hypertensive medications compliantly without side effects.  Denies chest pain, dyspnea, edema, or TIA's.    Per problem list.      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       PAST SURGICAL HISTORY:  Past Surgical History:   Procedure Laterality Date    PROSTATE BIOPSY      negative       SOCIAL HISTORY:  Social History     Socioeconomic History    Marital status: Significant Other     Spouse name: Not on file    Number of children: Not on file    Years of education: Not on file    Highest education level: Not on file   Occupational History    Not on file   Social Needs    Financial resource strain: Not hard at all    Food insecurity     Worry: Never true     Inability: Never true    Transportation needs     Medical: No     Non-medical: No   Tobacco Use    Smoking status: Never Smoker    Smokeless tobacco: Never Used   Substance and Sexual Activity    Alcohol use: Yes     Frequency: 2-3 times a week     Drinks per session: 1 or 2     Binge frequency: Less than monthly    Drug use: No    Sexual activity: Yes     Partners: Female   Lifestyle    Physical activity     Days per week: Not on file     Minutes per session: Not on file    Stress: Only a little   Relationships    Social connections     Talks on phone: More than three times a week     Gets together: Once a week     Attends Congregational service: More than 4 times per year     Active member of club or organization: Yes     Attends meetings of clubs or organizations: More than 4 times per year     Relationship status:    Other Topics Concern    Not on file   Social History  Narrative    Not on file       FAMILY HISTORY:  Family History   Problem Relation Age of Onset    No Known Problems Mother     No Known Problems Father     Diabetes Sister     Hypertension Sister     Diabetes Brother     No Known Problems Son     No Known Problems Sister     No Known Problems Sister     No Known Problems Sister     No Known Problems Brother     No Known Problems Brother     No Known Problems Brother     No Known Problems Maternal Aunt     No Known Problems Maternal Uncle     No Known Problems Paternal Aunt     No Known Problems Paternal Uncle     No Known Problems Maternal Grandmother     No Known Problems Maternal Grandfather     No Known Problems Paternal Grandmother     No Known Problems Paternal Grandfather     Blindness Neg Hx     Glaucoma Neg Hx     Retinal detachment Neg Hx     Macular degeneration Neg Hx     Amblyopia Neg Hx     Cancer Neg Hx     Cataracts Neg Hx     Strabismus Neg Hx     Stroke Neg Hx     Thyroid disease Neg Hx        ALLERGIES AND MEDICATIONS: updated and reviewed.  Review of patient's allergies indicates:  No Known Allergies  Medication List with Changes/Refills   Current Medications    AMLODIPINE (NORVASC) 5 MG TABLET    TAKE TWO TABLETS BY MOUTH EVERY DAY FOR BLOOD PRESSURE    FISH OIL-OMEGA-3 FATTY ACIDS 300-1,000 MG CAPSULE    Take 2 g by mouth once daily.    LISINOPRIL-HYDROCHLOROTHIAZIDE (PRINZIDE,ZESTORETIC) 20-12.5 MG PER TABLET    Take 1 tablet by mouth once daily.    SAW PALMETTO 80 MG CAPSULE    Take 80 mg by mouth once daily.          CARE TEAM:  Patient Care Team:  Reddy Kong MD as PCP - General (Family Medicine)  Valery Haywood, PharmD as Hypertension Digital Medicine Clinician (Pharmacist)  Reddy Kong MD as Hypertension Digital Medicine Responsible Provider (Family Medicine)  Nuupr Nix as Digital Medicine Health   Cyndi Harris MA as Care Coordinator         REVIEW OF SYSTEMS:  Review of Systems   Constitutional:  "Negative for activity change and unexpected weight change.   HENT: Negative for hearing loss, rhinorrhea and trouble swallowing.    Eyes: Negative for discharge and visual disturbance.   Respiratory: Negative for chest tightness and wheezing.    Cardiovascular: Negative for chest pain and palpitations.   Gastrointestinal: Negative for blood in stool, constipation, diarrhea and vomiting.   Endocrine: Negative for polydipsia and polyuria.   Genitourinary: Negative for difficulty urinating, hematuria and urgency.   Musculoskeletal: Negative for arthralgias, joint swelling and neck pain.   Neurological: Negative for weakness and headaches.   Psychiatric/Behavioral: Negative for confusion and dysphoric mood.         PHYSICAL EXAM:  Vitals:    06/16/20 1128   BP: 106/81     Weight: 78 kg (172 lb)   Height: 5' 9" (175.3 cm)   Body mass index is 25.4 kg/m².    Physical Examination: General appearance - alert, well appearing, and in no distress  Mental status - alert, oriented to person, place, and time  Eyes -   Nose -   Mouth -   Neck - Lymphatics -   Chest -   Heart -   Abdomen -   Back exam - Neurological -   Musculoskeletal -   Skin -        Medication List with Changes/Refills   Current Medications    AMLODIPINE (NORVASC) 5 MG TABLET    TAKE TWO TABLETS BY MOUTH EVERY DAY FOR BLOOD PRESSURE    FISH OIL-OMEGA-3 FATTY ACIDS 300-1,000 MG CAPSULE    Take 2 g by mouth once daily.    LISINOPRIL-HYDROCHLOROTHIAZIDE (PRINZIDE,ZESTORETIC) 20-12.5 MG PER TABLET    Take 1 tablet by mouth once daily.    SAW PALMETTO 80 MG CAPSULE    Take 80 mg by mouth once daily.       ASSESSMENT AND PLAN:    Problem List Items Addressed This Visit     Hypertriglyceridemia    Relevant Orders    Hemoglobin A1C    Lipid Panel    Comprehensive metabolic panel    Urinalysis    Hypertension - Primary    Relevant Orders    Hemoglobin A1C    Lipid Panel    Comprehensive metabolic panel    Urinalysis        Blas was seen today for " follow-up.    Diagnoses and all orders for this visit:    Essential hypertension  -     Hemoglobin A1C; Future  -     Lipid Panel; Future  -     Comprehensive metabolic panel; Future  -     Urinalysis; Future    Hypertriglyceridemia  -     Hemoglobin A1C; Future  -     Lipid Panel; Future  -     Comprehensive metabolic panel; Future  -     Urinalysis; Future    The current medical regimen is effective;  continue present plan and medications.      No future appointments.    No follow-ups on file. or sooner as needed.

## 2020-06-17 ENCOUNTER — LAB VISIT (OUTPATIENT)
Dept: LAB | Facility: HOSPITAL | Age: 60
End: 2020-06-17
Attending: FAMILY MEDICINE
Payer: COMMERCIAL

## 2020-06-17 DIAGNOSIS — E78.1 HYPERTRIGLYCERIDEMIA: ICD-10-CM

## 2020-06-17 DIAGNOSIS — I10 ESSENTIAL HYPERTENSION: ICD-10-CM

## 2020-06-17 LAB
BILIRUB UR QL STRIP: NEGATIVE
CLARITY UR: CLEAR
COLOR UR: YELLOW
GLUCOSE UR QL STRIP: NEGATIVE
HGB UR QL STRIP: ABNORMAL
KETONES UR QL STRIP: NEGATIVE
LEUKOCYTE ESTERASE UR QL STRIP: NEGATIVE
MICROSCOPIC COMMENT: NORMAL
NITRITE UR QL STRIP: NEGATIVE
PH UR STRIP: 5 [PH] (ref 5–8)
PROT UR QL STRIP: NEGATIVE
RBC #/AREA URNS HPF: 0 /HPF (ref 0–4)
SP GR UR STRIP: 1.02 (ref 1–1.03)
URN SPEC COLLECT METH UR: ABNORMAL
UROBILINOGEN UR STRIP-ACNC: NEGATIVE EU/DL
WBC #/AREA URNS HPF: 1 /HPF (ref 0–5)

## 2020-06-17 PROCEDURE — 81000 URINALYSIS NONAUTO W/SCOPE: CPT

## 2020-06-30 ENCOUNTER — PATIENT OUTREACH (OUTPATIENT)
Dept: OTHER | Facility: OTHER | Age: 60
End: 2020-06-30

## 2020-07-15 ENCOUNTER — PATIENT OUTREACH (OUTPATIENT)
Dept: OTHER | Facility: OTHER | Age: 60
End: 2020-07-15

## 2020-07-15 NOTE — PROGRESS NOTES
Digital Medicine: Clinician Follow-Up    Called patient for digital medicine follow up. He is doing well overall. Valsartan-hydrochlorothiazide was on back order and Dr. Kong resumed patient's lisinopril-hydrochlorothiazide prescription. Patient is tolerating with no complaints and blood pressure has remained well controlled.     The history is provided by the patient.   Follow-up reason(s): medication change follow-up.     Patient readings are stable         Last 5 Patient Entered Readings                                      Current 30 Day Average: 117/78     Recent Readings 7/7/2020 6/28/2020 6/19/2020 6/12/2020 6/12/2020    SBP (mmHg) 119 118 113 106 60    DBP (mmHg) 81 79 74 81 49    Pulse 65 60 75 90 91             Screenings    PLAN  Continue current therapy: No changes needed.    Patient verbalizes understanding. Patient did not express questions or concerns and patient has contact information if needed.          There are no preventive care reminders to display for this patient.      Hypertension Medications             amLODIPine (NORVASC) 5 MG tablet TAKE TWO TABLETS BY MOUTH EVERY DAY FOR BLOOD PRESSURE    lisinopriL-hydrochlorothiazide (PRINZIDE,ZESTORETIC) 20-12.5 mg per tablet Take 1 tablet by mouth once daily.

## 2020-08-28 ENCOUNTER — PATIENT OUTREACH (OUTPATIENT)
Dept: OTHER | Facility: OTHER | Age: 60
End: 2020-08-28

## 2020-09-18 NOTE — PROGRESS NOTES
Digital Medicine: Health  Follow-Up    The history is provided by the patient.                 Patient needs assistance troubleshooting: tech support needed.    Additional Follow-up details: Mr. Odonnell is doing well. Spoke with patient through [employer] message on 9/2/20. We were having issues with BP transmission, however, tech team was able to help readings come through. Will follow up in ~1month with patient.           Diet-Not assessed          Physical Activity-Not assessed    Medication Adherence-Medication Adherence not addressed.      Substance, Sleep, Stress-Not assessed      Additional monitoring needed.       Addressed patient questions and patient has my contact information if needed prior to next outreach. Patient verbalizes understanding.          There are no preventive care reminders to display for this patient.    Last 5 Patient Entered Readings                                      Current 30 Day Average: 132/82     Recent Readings 8/28/2020 8/13/2020 8/11/2020 8/11/2020 8/7/2020    SBP (mmHg) 132 105 109 107 121    DBP (mmHg) 82 80 73 75 85    Pulse 71 62 83 85 67

## 2020-10-19 ENCOUNTER — PATIENT OUTREACH (OUTPATIENT)
Dept: OTHER | Facility: OTHER | Age: 60
End: 2020-10-19

## 2020-10-22 ENCOUNTER — TELEPHONE (OUTPATIENT)
Dept: FAMILY MEDICINE | Facility: CLINIC | Age: 60
End: 2020-10-22

## 2020-10-22 NOTE — TELEPHONE ENCOUNTER
Return call to Pt, and he states that his co-worker's Mother is positive for Covid-19. That she went to get a rapid test today and he was concerned. Informed him that if he isn't having any symptoms he should be okay. If his co-worker does have a positive test, then he would need to quarantine for 14 day's.

## 2020-10-22 NOTE — TELEPHONE ENCOUNTER
----- Message from Ginny Stein sent at 10/22/2020 12:14 PM CDT -----  Patient is at risk for COVID-19.  Patient is not presenting any symptoms.       Patient states his coworker mother has tested positive for covid 19  is on her way to be tested. Patient would like to know should he be tested. Please call.       Patient call back number [phone]

## 2020-10-22 NOTE — TELEPHONE ENCOUNTER
----- Message from Ginny Stein sent at 10/22/2020 12:14 PM CDT -----  Patient is at risk for COVID-19.  Patient is not presenting any symptoms.       Patient states his coworker mother has tested positive for covid 19  is on her way to be tested. Patient would like to know should he be tested. Please call.       Patient call back number [phone]

## 2020-11-02 ENCOUNTER — PATIENT MESSAGE (OUTPATIENT)
Dept: OTHER | Facility: OTHER | Age: 60
End: 2020-11-02

## 2020-11-04 NOTE — PROGRESS NOTES
Digital Medicine: Health  Follow-Up    The history is provided by the patient.             Reason for review: Blood pressure at goal        Topics Covered on Call: hypotension symptoms    Additional Follow-up details: Mr. Odonnell reports that he's doing ok.     Patient messaged me on Mychart concerned with some dizziness he's experiencing. Normally he'll have it when he moves from a seated to a standing position. Advised patient to stay hydrated and to also consume a small salty snack to help with symptoms. I will also reach out to clinician about symptoms.     Patient also asked if he had questionnaires that needed to be filled out. Informed him that 2 were ready to be taken.            Diet-Not assessed          Physical Activity-Not assessed    Medication Adherence-Medication Adherence not addressed.      Substance, Sleep, Stress-Not assessed      Placed task for clinician.       Addressed patient questions and patient has my contact information if needed prior to next outreach. Patient verbalizes understanding.             There are no preventive care reminders to display for this patient.      Last 5 Patient Entered Readings                                      Current 30 Day Average: 107/78     Recent Readings 11/1/2020 10/22/2020 10/21/2020 10/21/2020 10/19/2020    SBP (mmHg) 107 101 111 100 98    DBP (mmHg) 76 77 83 81 66    Pulse 77 100 95 92 81

## 2020-11-05 ENCOUNTER — PATIENT OUTREACH (OUTPATIENT)
Dept: OTHER | Facility: OTHER | Age: 60
End: 2020-11-05

## 2020-11-05 DIAGNOSIS — I10 ESSENTIAL HYPERTENSION: ICD-10-CM

## 2020-11-05 RX ORDER — AMLODIPINE BESYLATE 5 MG/1
5 TABLET ORAL DAILY
Qty: 90 TABLET | Refills: 1
Start: 2020-11-05 | End: 2021-08-24

## 2020-11-05 NOTE — PROGRESS NOTES
Digital Medicine: Clinician Follow-Up    Called patient for digital medicine follow up. Reports occasional episodes of dizziness when changing positions in the evening. Incidents only occur in the evening and subside within a few seconds after after being on his feet. Denies additional changes to medication regimen. Patient is now running 46 miles per week and weighs 170 lbs.     The history is provided by the patient.     Hypertension    Patient's blood pressure is stable.         Last 5 Patient Entered Readings                                      Current 30 Day Average: 109/78     Recent Readings 11/4/2020 11/1/2020 10/22/2020 10/21/2020 10/21/2020    SBP (mmHg) 118 107 101 111 100    DBP (mmHg) 78 76 77 83 81    Pulse 80 77 100 95 92                 Depression Screening  Did not address depression screening.    Sleep Apnea Screening    Did not address sleep apnea screening.     Medication Affordability Screening  Did not address medication affordability screening.     Medication Adherence-Medication adherence was assessed.          ASSESSMENT(S)  Patients BP average is 109/78 mmHg, which is at goal. Patient's BP goal is less than or equal to 130/80.    Possible orthostasis?       Hypertension Plan  Hypertension Medication Change. Reduce amlodipine to 5 mg daily. Continue lisinopril-hct.   Additional monitoring needed. Recommend daily blood pressure monitoirng until next follow up.   Provided patient education. Encouraged patient to change positions slowly and maintain adequate hydration.        Addressed patient questions and patient has my contact information if needed prior to next outreach. Patient verbalizes understanding.             There are no preventive care reminders to display for this patient.  There are no preventive care reminders to display for this patient.      Hypertension Medications             amLODIPine (NORVASC) 5 MG tablet Take 1 tablet (5 mg total) by mouth once daily.     lisinopriL-hydrochlorothiazide (PRINZIDE,ZESTORETIC) 20-12.5 mg per tablet Take 1 tablet by mouth once daily.

## 2020-11-19 ENCOUNTER — PATIENT OUTREACH (OUTPATIENT)
Dept: OTHER | Facility: OTHER | Age: 60
End: 2020-11-19

## 2020-11-19 NOTE — PROGRESS NOTES
Digital Medicine: Clinician Follow-Up    Called patient for digital medicine follow up. Reduced amlodipine to 5 mg once daily after patient began to experience dizziness. Symptoms improved with dose reduction. Note some higher blood pressure readings last week that patient attributes to inconsistent exercise. States that he is back on track this week and notices that blood pressure is lower.     The history is provided by the patient.   Follow-up reason(s): medication change follow-up.     Hypertension    Patient's blood pressure readings are labile.   Patient did make medication change.    Is patient tolerating med change? yes            Last 5 Patient Entered Readings                                      Current 30 Day Average: 118/81     Recent Readings 11/18/2020 11/17/2020 11/16/2020 11/12/2020 11/10/2020    SBP (mmHg) 122 117 125 112 136    DBP (mmHg) 81 83 84 80 73    Pulse 86 79 81 77 87                 Depression Screening  Did not address depression screening.    Sleep Apnea Screening    Did not address sleep apnea screening.     Medication Affordability Screening  Did not address medication affordability screening.     Medication Adherence-Medication adherence was assessed.          ASSESSMENT(S)  Patients BP average is 118/81 mmHg, which is at goal. Patient's BP goal is less than or equal to 130/80.    Some outliers due to lifestyle changes but patient seems to be trending down now.       Hypertension Plan  Continue current therapy.  Provided patient education.  Will continue lower dose for now. If blood pressure is consistently > 130/80 mmHg. May consider increasing ACEI combination vs amlodipine.      Addressed patient questions and patient has my contact information if needed prior to next outreach. Patient verbalizes understanding.             There are no preventive care reminders to display for this patient.  There are no preventive care reminders to display for this patient.      Hypertension  Medications             amLODIPine (NORVASC) 5 MG tablet Take 1 tablet (5 mg total) by mouth once daily.    lisinopriL-hydrochlorothiazide (PRINZIDE,ZESTORETIC) 20-12.5 mg per tablet Take 1 tablet by mouth once daily.

## 2020-12-09 ENCOUNTER — PATIENT OUTREACH (OUTPATIENT)
Dept: OTHER | Facility: OTHER | Age: 60
End: 2020-12-09

## 2020-12-17 ENCOUNTER — PATIENT MESSAGE (OUTPATIENT)
Dept: FAMILY MEDICINE | Facility: CLINIC | Age: 60
End: 2020-12-17

## 2020-12-18 ENCOUNTER — OFFICE VISIT (OUTPATIENT)
Dept: FAMILY MEDICINE | Facility: CLINIC | Age: 60
End: 2020-12-18
Payer: COMMERCIAL

## 2020-12-18 VITALS
TEMPERATURE: 98 F | WEIGHT: 171.94 LBS | HEART RATE: 71 BPM | SYSTOLIC BLOOD PRESSURE: 104 MMHG | DIASTOLIC BLOOD PRESSURE: 76 MMHG | HEIGHT: 69 IN | OXYGEN SATURATION: 96 % | BODY MASS INDEX: 25.47 KG/M2

## 2020-12-18 DIAGNOSIS — E78.1 HYPERTRIGLYCERIDEMIA: ICD-10-CM

## 2020-12-18 DIAGNOSIS — Z00.00 ANNUAL PHYSICAL EXAM: Primary | ICD-10-CM

## 2020-12-18 DIAGNOSIS — I10 ESSENTIAL HYPERTENSION: ICD-10-CM

## 2020-12-18 PROCEDURE — 99396 PR PREVENTIVE VISIT,EST,40-64: ICD-10-PCS | Mod: S$GLB,,, | Performed by: FAMILY MEDICINE

## 2020-12-18 PROCEDURE — 99999 PR PBB SHADOW E&M-EST. PATIENT-LVL III: CPT | Mod: PBBFAC,,, | Performed by: FAMILY MEDICINE

## 2020-12-18 PROCEDURE — 99396 PREV VISIT EST AGE 40-64: CPT | Mod: S$GLB,,, | Performed by: FAMILY MEDICINE

## 2020-12-18 PROCEDURE — 99999 PR PBB SHADOW E&M-EST. PATIENT-LVL III: ICD-10-PCS | Mod: PBBFAC,,, | Performed by: FAMILY MEDICINE

## 2020-12-18 NOTE — PROGRESS NOTES
"Chief Complaint   Patient presents with    Leg Pain     right     SUBJECTIVE:   Blas Alcocer is a 60 y.o. male presenting for his annual checkup.  Current Outpatient Medications   Medication Sig Dispense Refill    amLODIPine (NORVASC) 5 MG tablet Take 1 tablet (5 mg total) by mouth once daily. 90 tablet 1    fish oil-omega-3 fatty acids 300-1,000 mg capsule Take 2 g by mouth once daily.      lisinopriL-hydrochlorothiazide (PRINZIDE,ZESTORETIC) 20-12.5 mg per tablet Take 1 tablet by mouth once daily. 90 tablet 3    saw palmetto 80 MG capsule Take 80 mg by mouth once daily.       No current facility-administered medications for this visit.      Allergies: Patient has no known allergies.     ROS:  Feeling well. No dyspnea or chest pain on exertion. No abdominal pain, change in bowel habits, black or bloody stools. No urinary tract or prostatic symptoms. No neurological complaints.    OBJECTIVE:   The patient appears well, alert, oriented x 3, in no distress.   /76   Pulse 71   Temp 97.5 °F (36.4 °C) (Oral)   Ht 5' 9" (1.753 m)   Wt 78 kg (171 lb 15.3 oz)   SpO2 96%   BMI 25.39 kg/m²   ENT normal.  Neck supple. No adenopathy or thyromegaly. KEVIN. Lungs are clear, good air entry, no wheezes, rhonchi or rales. S1 and S2 normal, no murmurs, regular rate and rhythm. Abdomen is soft without tenderness, guarding, mass or organomegaly.  exam: .  Extremities show no edema, normal peripheral pulses. Neurological is normal without focal findings.    ASSESSMENT:   1. Annual physical exam    2. Essential hypertension    3. Hypertriglyceridemia          PLAN:   Blas was seen today for leg pain.    Diagnoses and all orders for this visit:    Annual physical exam    Essential hypertension    Hypertriglyceridemia      Counseled on age appropriate medical preventative services, including age appropriate cancer screenings, over all nutritional health, need for a consistent exercise regimen and an over all push " towards maintaining a vigorous and active lifestyle.  Counseled on age appropriate vaccines and discussed upcoming health care needs based on age/gender.  Spent time with patient counseling on need for a good patient/doctor relationship moving forward.  Discussed use of common OTC medications and supplements.  Discussed common dietary aids and use of caffeine and the need for good sleep hygiene and stress management.    The current medical regimen is effective;  continue present plan and medications.

## 2021-01-26 ENCOUNTER — PATIENT MESSAGE (OUTPATIENT)
Dept: FAMILY MEDICINE | Facility: CLINIC | Age: 61
End: 2021-01-26

## 2021-01-27 ENCOUNTER — CLINICAL SUPPORT (OUTPATIENT)
Dept: URGENT CARE | Facility: CLINIC | Age: 61
End: 2021-01-27
Payer: COMMERCIAL

## 2021-01-27 ENCOUNTER — PATIENT MESSAGE (OUTPATIENT)
Dept: FAMILY MEDICINE | Facility: CLINIC | Age: 61
End: 2021-01-27

## 2021-01-27 DIAGNOSIS — U07.1 COVID-19 VIRUS DETECTED: ICD-10-CM

## 2021-01-27 DIAGNOSIS — Z11.9 ENCOUNTER FOR SCREENING EXAMINATION FOR INFECTIOUS DISEASE: Primary | ICD-10-CM

## 2021-01-27 DIAGNOSIS — U07.1 COVID-19 VIRUS INFECTION: Primary | ICD-10-CM

## 2021-01-27 LAB
CTP QC/QA: YES
SARS-COV-2 RDRP RESP QL NAA+PROBE: POSITIVE

## 2021-01-27 PROCEDURE — U0002 COVID-19 LAB TEST NON-CDC: HCPCS | Mod: QW,S$GLB,, | Performed by: INTERNAL MEDICINE

## 2021-01-27 PROCEDURE — U0002: ICD-10-PCS | Mod: QW,S$GLB,, | Performed by: INTERNAL MEDICINE

## 2021-02-02 ENCOUNTER — TELEPHONE (OUTPATIENT)
Dept: FAMILY MEDICINE | Facility: CLINIC | Age: 61
End: 2021-02-02

## 2021-02-05 ENCOUNTER — LAB VISIT (OUTPATIENT)
Dept: FAMILY MEDICINE | Facility: CLINIC | Age: 61
End: 2021-02-05
Payer: COMMERCIAL

## 2021-02-05 ENCOUNTER — PATIENT MESSAGE (OUTPATIENT)
Dept: FAMILY MEDICINE | Facility: CLINIC | Age: 61
End: 2021-02-05

## 2021-02-05 DIAGNOSIS — U07.1 COVID-19 VIRUS INFECTION: ICD-10-CM

## 2021-02-05 PROCEDURE — U0003 INFECTIOUS AGENT DETECTION BY NUCLEIC ACID (DNA OR RNA); SEVERE ACUTE RESPIRATORY SYNDROME CORONAVIRUS 2 (SARS-COV-2) (CORONAVIRUS DISEASE [COVID-19]), AMPLIFIED PROBE TECHNIQUE, MAKING USE OF HIGH THROUGHPUT TECHNOLOGIES AS DESCRIBED BY CMS-2020-01-R: HCPCS

## 2021-02-06 LAB — SARS-COV-2 RNA RESP QL NAA+PROBE: NOT DETECTED

## 2021-02-23 ENCOUNTER — PATIENT MESSAGE (OUTPATIENT)
Dept: FAMILY MEDICINE | Facility: CLINIC | Age: 61
End: 2021-02-23

## 2021-02-25 ENCOUNTER — IMMUNIZATION (OUTPATIENT)
Dept: OBSTETRICS AND GYNECOLOGY | Facility: CLINIC | Age: 61
End: 2021-02-25
Payer: COMMERCIAL

## 2021-02-25 DIAGNOSIS — Z23 NEED FOR VACCINATION: Primary | ICD-10-CM

## 2021-02-25 PROCEDURE — 91300 COVID-19, MRNA, LNP-S, PF, 30 MCG/0.3 ML DOSE VACCINE: CPT | Mod: PBBFAC | Performed by: FAMILY MEDICINE

## 2021-03-16 ENCOUNTER — PATIENT MESSAGE (OUTPATIENT)
Dept: FAMILY MEDICINE | Facility: CLINIC | Age: 61
End: 2021-03-16

## 2021-03-16 DIAGNOSIS — M79.10 MYALGIA: Primary | ICD-10-CM

## 2021-03-16 RX ORDER — METHYLPREDNISOLONE 4 MG/1
TABLET ORAL
Qty: 1 PACKAGE | Refills: 0 | Status: SHIPPED | OUTPATIENT
Start: 2021-03-16 | End: 2021-04-06

## 2021-03-16 RX ORDER — METHOCARBAMOL 750 MG/1
750 TABLET, FILM COATED ORAL 4 TIMES DAILY
Qty: 40 TABLET | Refills: 0 | Status: SHIPPED | OUTPATIENT
Start: 2021-03-16 | End: 2021-03-26

## 2021-03-18 ENCOUNTER — IMMUNIZATION (OUTPATIENT)
Dept: OBSTETRICS AND GYNECOLOGY | Facility: CLINIC | Age: 61
End: 2021-03-18
Payer: COMMERCIAL

## 2021-03-18 DIAGNOSIS — Z23 NEED FOR VACCINATION: Primary | ICD-10-CM

## 2021-03-18 PROCEDURE — 91300 COVID-19, MRNA, LNP-S, PF, 30 MCG/0.3 ML DOSE VACCINE: CPT | Mod: PBBFAC | Performed by: FAMILY MEDICINE

## 2021-03-18 PROCEDURE — 0002A COVID-19, MRNA, LNP-S, PF, 30 MCG/0.3 ML DOSE VACCINE: CPT | Mod: PBBFAC | Performed by: FAMILY MEDICINE

## 2021-03-24 ENCOUNTER — PATIENT MESSAGE (OUTPATIENT)
Dept: FAMILY MEDICINE | Facility: CLINIC | Age: 61
End: 2021-03-24

## 2021-10-27 ENCOUNTER — PATIENT MESSAGE (OUTPATIENT)
Dept: FAMILY MEDICINE | Facility: CLINIC | Age: 61
End: 2021-10-27
Payer: COMMERCIAL

## 2021-11-12 ENCOUNTER — IMMUNIZATION (OUTPATIENT)
Dept: OBSTETRICS AND GYNECOLOGY | Facility: CLINIC | Age: 61
End: 2021-11-12
Payer: COMMERCIAL

## 2021-11-12 DIAGNOSIS — Z23 NEED FOR VACCINATION: Primary | ICD-10-CM

## 2021-11-12 PROCEDURE — 0004A COVID-19, MRNA, LNP-S, PF, 30 MCG/0.3 ML DOSE VACCINE: CPT | Mod: PBBFAC | Performed by: FAMILY MEDICINE

## 2021-12-13 ENCOUNTER — OFFICE VISIT (OUTPATIENT)
Dept: FAMILY MEDICINE | Facility: CLINIC | Age: 61
End: 2021-12-13
Payer: COMMERCIAL

## 2021-12-13 VITALS
WEIGHT: 194.88 LBS | DIASTOLIC BLOOD PRESSURE: 82 MMHG | HEART RATE: 60 BPM | HEIGHT: 69 IN | SYSTOLIC BLOOD PRESSURE: 130 MMHG | OXYGEN SATURATION: 97 % | BODY MASS INDEX: 28.87 KG/M2 | TEMPERATURE: 98 F

## 2021-12-13 DIAGNOSIS — Z00.00 ANNUAL PHYSICAL EXAM: ICD-10-CM

## 2021-12-13 DIAGNOSIS — Z12.11 SPECIAL SCREENING FOR MALIGNANT NEOPLASMS, COLON: ICD-10-CM

## 2021-12-13 DIAGNOSIS — Z23 NEEDS FLU SHOT: Primary | ICD-10-CM

## 2021-12-13 PROCEDURE — 99999 PR PBB SHADOW E&M-EST. PATIENT-LVL III: ICD-10-PCS | Mod: PBBFAC,,, | Performed by: FAMILY MEDICINE

## 2021-12-13 PROCEDURE — 99396 PREV VISIT EST AGE 40-64: CPT | Mod: 25,S$GLB,, | Performed by: FAMILY MEDICINE

## 2021-12-13 PROCEDURE — 99999 PR PBB SHADOW E&M-EST. PATIENT-LVL III: CPT | Mod: PBBFAC,,, | Performed by: FAMILY MEDICINE

## 2021-12-13 PROCEDURE — 90471 IMMUNIZATION ADMIN: CPT | Mod: S$GLB,,, | Performed by: FAMILY MEDICINE

## 2021-12-13 PROCEDURE — 90686 IIV4 VACC NO PRSV 0.5 ML IM: CPT | Mod: S$GLB,,, | Performed by: FAMILY MEDICINE

## 2021-12-13 PROCEDURE — 4010F ACE/ARB THERAPY RXD/TAKEN: CPT | Mod: CPTII,S$GLB,, | Performed by: FAMILY MEDICINE

## 2021-12-13 PROCEDURE — 90686 FLU VACCINE (QUAD) GREATER THAN OR EQUAL TO 3YO PRESERVATIVE FREE IM: ICD-10-PCS | Mod: S$GLB,,, | Performed by: FAMILY MEDICINE

## 2021-12-13 PROCEDURE — 4010F PR ACE/ARB THEARPY RXD/TAKEN: ICD-10-PCS | Mod: CPTII,S$GLB,, | Performed by: FAMILY MEDICINE

## 2021-12-13 PROCEDURE — 90471 FLU VACCINE (QUAD) GREATER THAN OR EQUAL TO 3YO PRESERVATIVE FREE IM: ICD-10-PCS | Mod: S$GLB,,, | Performed by: FAMILY MEDICINE

## 2021-12-13 PROCEDURE — 99396 PR PREVENTIVE VISIT,EST,40-64: ICD-10-PCS | Mod: 25,S$GLB,, | Performed by: FAMILY MEDICINE

## 2021-12-17 ENCOUNTER — LAB VISIT (OUTPATIENT)
Dept: LAB | Facility: HOSPITAL | Age: 61
End: 2021-12-17
Attending: FAMILY MEDICINE
Payer: COMMERCIAL

## 2021-12-17 ENCOUNTER — PATIENT MESSAGE (OUTPATIENT)
Dept: FAMILY MEDICINE | Facility: CLINIC | Age: 61
End: 2021-12-17
Payer: COMMERCIAL

## 2021-12-17 DIAGNOSIS — Z00.00 ANNUAL PHYSICAL EXAM: ICD-10-CM

## 2021-12-17 LAB
ALBUMIN SERPL BCP-MCNC: 4.2 G/DL (ref 3.5–5.2)
ALP SERPL-CCNC: 55 U/L (ref 55–135)
ALT SERPL W/O P-5'-P-CCNC: 21 U/L (ref 10–44)
ANION GAP SERPL CALC-SCNC: 9 MMOL/L (ref 8–16)
AST SERPL-CCNC: 18 U/L (ref 10–40)
BASOPHILS # BLD AUTO: 0.04 K/UL (ref 0–0.2)
BASOPHILS NFR BLD: 0.6 % (ref 0–1.9)
BILIRUB SERPL-MCNC: 1.1 MG/DL (ref 0.1–1)
BUN SERPL-MCNC: 15 MG/DL (ref 8–23)
CALCIUM SERPL-MCNC: 9.4 MG/DL (ref 8.7–10.5)
CHLORIDE SERPL-SCNC: 103 MMOL/L (ref 95–110)
CHOLEST SERPL-MCNC: 206 MG/DL (ref 120–199)
CHOLEST/HDLC SERPL: 4.4 {RATIO} (ref 2–5)
CO2 SERPL-SCNC: 27 MMOL/L (ref 23–29)
CREAT SERPL-MCNC: 1 MG/DL (ref 0.5–1.4)
DIFFERENTIAL METHOD: ABNORMAL
EOSINOPHIL # BLD AUTO: 0 K/UL (ref 0–0.5)
EOSINOPHIL NFR BLD: 0.6 % (ref 0–8)
ERYTHROCYTE [DISTWIDTH] IN BLOOD BY AUTOMATED COUNT: 14.2 % (ref 11.5–14.5)
EST. GFR  (AFRICAN AMERICAN): >60 ML/MIN/1.73 M^2
EST. GFR  (NON AFRICAN AMERICAN): >60 ML/MIN/1.73 M^2
GLUCOSE SERPL-MCNC: 100 MG/DL (ref 70–110)
HCT VFR BLD AUTO: 48.7 % (ref 40–54)
HDLC SERPL-MCNC: 47 MG/DL (ref 40–75)
HDLC SERPL: 22.8 % (ref 20–50)
HGB BLD-MCNC: 16.6 G/DL (ref 14–18)
IMM GRANULOCYTES # BLD AUTO: 0.04 K/UL (ref 0–0.04)
IMM GRANULOCYTES NFR BLD AUTO: 0.6 % (ref 0–0.5)
LDLC SERPL CALC-MCNC: 141 MG/DL (ref 63–159)
LYMPHOCYTES # BLD AUTO: 1.5 K/UL (ref 1–4.8)
LYMPHOCYTES NFR BLD: 20 % (ref 18–48)
MCH RBC QN AUTO: 28.6 PG (ref 27–31)
MCHC RBC AUTO-ENTMCNC: 34.1 G/DL (ref 32–36)
MCV RBC AUTO: 84 FL (ref 82–98)
MONOCYTES # BLD AUTO: 1.1 K/UL (ref 0.3–1)
MONOCYTES NFR BLD: 15.4 % (ref 4–15)
NEUTROPHILS # BLD AUTO: 4.6 K/UL (ref 1.8–7.7)
NEUTROPHILS NFR BLD: 62.8 % (ref 38–73)
NONHDLC SERPL-MCNC: 159 MG/DL
NRBC BLD-RTO: 0 /100 WBC
PLATELET # BLD AUTO: 226 K/UL (ref 150–450)
PMV BLD AUTO: 10.9 FL (ref 9.2–12.9)
POTASSIUM SERPL-SCNC: 4.1 MMOL/L (ref 3.5–5.1)
PROT SERPL-MCNC: 6.8 G/DL (ref 6–8.4)
RBC # BLD AUTO: 5.81 M/UL (ref 4.6–6.2)
SODIUM SERPL-SCNC: 139 MMOL/L (ref 136–145)
T4 FREE SERPL-MCNC: 0.91 NG/DL (ref 0.71–1.51)
TRIGL SERPL-MCNC: 90 MG/DL (ref 30–150)
TSH SERPL DL<=0.005 MIU/L-ACNC: 0.24 UIU/ML (ref 0.4–4)
WBC # BLD AUTO: 7.26 K/UL (ref 3.9–12.7)

## 2021-12-17 PROCEDURE — 85025 COMPLETE CBC W/AUTO DIFF WBC: CPT | Performed by: FAMILY MEDICINE

## 2021-12-17 PROCEDURE — 83036 HEMOGLOBIN GLYCOSYLATED A1C: CPT | Performed by: FAMILY MEDICINE

## 2021-12-17 PROCEDURE — 84153 ASSAY OF PSA TOTAL: CPT | Performed by: FAMILY MEDICINE

## 2021-12-17 PROCEDURE — 80053 COMPREHEN METABOLIC PANEL: CPT | Performed by: FAMILY MEDICINE

## 2021-12-17 PROCEDURE — 80061 LIPID PANEL: CPT | Performed by: FAMILY MEDICINE

## 2021-12-17 PROCEDURE — 36415 COLL VENOUS BLD VENIPUNCTURE: CPT | Mod: PO | Performed by: FAMILY MEDICINE

## 2021-12-17 PROCEDURE — 84439 ASSAY OF FREE THYROXINE: CPT | Performed by: FAMILY MEDICINE

## 2021-12-17 PROCEDURE — 84443 ASSAY THYROID STIM HORMONE: CPT | Performed by: FAMILY MEDICINE

## 2021-12-18 LAB
COMPLEXED PSA SERPL-MCNC: 4.9 NG/ML (ref 0–4)
ESTIMATED AVG GLUCOSE: 117 MG/DL (ref 68–131)
HBA1C MFR BLD: 5.7 % (ref 4–5.6)

## 2021-12-20 ENCOUNTER — PATIENT MESSAGE (OUTPATIENT)
Dept: FAMILY MEDICINE | Facility: CLINIC | Age: 61
End: 2021-12-20
Payer: COMMERCIAL

## 2021-12-20 DIAGNOSIS — R97.20 ELEVATED PSA: Primary | ICD-10-CM

## 2021-12-20 DIAGNOSIS — E05.90 SUBCLINICAL HYPERTHYROIDISM: Primary | ICD-10-CM

## 2021-12-21 ENCOUNTER — TELEPHONE (OUTPATIENT)
Dept: FAMILY MEDICINE | Facility: CLINIC | Age: 61
End: 2021-12-21
Payer: COMMERCIAL

## 2021-12-22 ENCOUNTER — LAB VISIT (OUTPATIENT)
Dept: PRIMARY CARE CLINIC | Facility: OTHER | Age: 61
End: 2021-12-22
Attending: INTERNAL MEDICINE
Payer: COMMERCIAL

## 2021-12-22 DIAGNOSIS — Z20.822 ENCOUNTER FOR LABORATORY TESTING FOR COVID-19 VIRUS: ICD-10-CM

## 2021-12-22 PROCEDURE — U0003 INFECTIOUS AGENT DETECTION BY NUCLEIC ACID (DNA OR RNA); SEVERE ACUTE RESPIRATORY SYNDROME CORONAVIRUS 2 (SARS-COV-2) (CORONAVIRUS DISEASE [COVID-19]), AMPLIFIED PROBE TECHNIQUE, MAKING USE OF HIGH THROUGHPUT TECHNOLOGIES AS DESCRIBED BY CMS-2020-01-R: HCPCS | Performed by: INTERNAL MEDICINE

## 2021-12-23 DIAGNOSIS — U07.1 COVID-19: Primary | ICD-10-CM

## 2021-12-23 LAB
SARS-COV-2 RNA RESP QL NAA+PROBE: DETECTED
SARS-COV-2- CYCLE NUMBER: 23

## 2021-12-28 ENCOUNTER — TELEPHONE (OUTPATIENT)
Dept: FAMILY MEDICINE | Facility: CLINIC | Age: 61
End: 2021-12-28
Payer: COMMERCIAL

## 2022-01-19 ENCOUNTER — PATIENT MESSAGE (OUTPATIENT)
Dept: FAMILY MEDICINE | Facility: CLINIC | Age: 62
End: 2022-01-19
Payer: COMMERCIAL

## 2022-01-26 DIAGNOSIS — I10 ESSENTIAL HYPERTENSION: ICD-10-CM

## 2022-01-26 RX ORDER — LISINOPRIL AND HYDROCHLOROTHIAZIDE 12.5; 2 MG/1; MG/1
1 TABLET ORAL DAILY
Qty: 90 TABLET | Refills: 3 | Status: SHIPPED | OUTPATIENT
Start: 2022-01-26 | End: 2023-01-25 | Stop reason: SDUPTHER

## 2022-01-26 NOTE — TELEPHONE ENCOUNTER
No new care gaps identified.  Powered by [employer]. Reference number: [id number].   1/26/2022 9:12:48 AM CST

## 2022-01-26 NOTE — TELEPHONE ENCOUNTER
Refill Authorization Note   Blas Alcocer  is requesting a refill authorization.  Brief Assessment and Rationale for Refill:  Approve     Medication Therapy Plan:       Medication Reconciliation Completed: No   Comments:   --->Care Gap information included below if applicable.       Requested Prescriptions   Pending Prescriptions Disp Refills    lisinopriL-hydrochlorothiazide (PRINZIDE,ZESTORETIC) 20-12.5 mg per tablet [Pharmacy Med Name: lisinopril 20 mg-hydrochlorothiazide 12.5 mg tablet] 90 tablet 3     Sig: Take 1 tablet by mouth once daily.       Cardiovascular:  ACEI + Diuretic Combos Passed - 1/26/2022 10:20 AM        Passed - Patient is at least 18 years old        Passed - Last BP in normal range within 360 days     BP Readings from Last 1 Encounters:   12/13/21 130/82               Passed - Valid encounter within last 15 months     Recent Visits  Date Type Provider Dept   12/13/21 Office Visit Reddy Kong MD [facility]   12/18/20 Office Visit Reddy Kong MD [facility]   Showing recent visits within past 720 days and meeting all other requirements  Future Appointments  No visits were found meeting these conditions.  Showing future appointments within next 150 days and meeting all other requirements      Future Appointments              In 1 month Tammy Walton, OD [facility]                Passed - Na is between 130 and 148 and within 360 days     Sodium   Date Value Ref Range Status   12/17/2021 139 136 - 145 mmol/L Final   06/17/2020 139 136 - 145 mmol/L Final   12/02/2019 139 136 - 145 mmol/L Final              Passed - K in normal range and within 360 days     Potassium   Date Value Ref Range Status   12/17/2021 4.1 3.5 - 5.1 mmol/L Final   06/17/2020 4.1 3.5 - 5.1 mmol/L Final   12/02/2019 4.2 3.5 - 5.1 mmol/L Final              Passed - Cr is 1.39 or below and within 360 days     Lab Results   Component Value Date     CREATININE 1.0 12/17/2021    CREATININE 0.9 06/17/2020    CREATININE 1.0 12/02/2019              Passed - eGFR within 360 days     Lab Results   Component Value Date    EGFRNONAA >60 12/17/2021    EGFRNONAA >60 06/17/2020    EGFRNONAA >60 12/02/2019                    Appointments  past 12m or future 3m with PCP    Date Provider   Last Visit   12/13/2021 Reddy Kong MD   Next Visit   Visit date not found Reddy Kong MD   ED visits in past 90 days: 0     Note composed:10:26 AM 01/26/2022

## 2022-01-27 ENCOUNTER — TELEPHONE (OUTPATIENT)
Dept: ENDOCRINOLOGY | Facility: CLINIC | Age: 62
End: 2022-01-27
Payer: COMMERCIAL

## 2022-01-27 NOTE — TELEPHONE ENCOUNTER
----- Message from Eloise Peralta sent at 1/26/2022  2:03 PM CST -----  Contact: ARNOLDO BURK [[id number]]  Type: Call Back    Who called:ARNOLDO BURK [[id number]]     What is the request in detail: Patient is requesting a call back in regards to scheduling a NP appointment. Please advise.     Can the clinic reply by [facility]? No    Would the patient rather a call back or a response via [facility]? Call back     Best call back number: [phone] (mobile)    Additional Information:

## 2022-02-04 ENCOUNTER — PATIENT MESSAGE (OUTPATIENT)
Dept: FAMILY MEDICINE | Facility: CLINIC | Age: 62
End: 2022-02-04
Payer: COMMERCIAL

## 2022-02-10 ENCOUNTER — OFFICE VISIT (OUTPATIENT)
Dept: UROLOGY | Facility: CLINIC | Age: 62
End: 2022-02-10
Payer: COMMERCIAL

## 2022-02-10 VITALS — HEIGHT: 69 IN | BODY MASS INDEX: 29.54 KG/M2 | WEIGHT: 199.44 LBS

## 2022-02-10 DIAGNOSIS — R35.0 URINARY FREQUENCY: ICD-10-CM

## 2022-02-10 DIAGNOSIS — N40.0 BPH WITHOUT OBSTRUCTION/LOWER URINARY TRACT SYMPTOMS: ICD-10-CM

## 2022-02-10 DIAGNOSIS — R97.20 ELEVATED PSA: Primary | ICD-10-CM

## 2022-02-10 LAB
BILIRUB SERPL-MCNC: NORMAL MG/DL
BLOOD URINE, POC: NORMAL
COLOR, POC UA: NORMAL
GLUCOSE UR QL STRIP: NORMAL
KETONES UR QL STRIP: NORMAL
LEUKOCYTE ESTERASE URINE, POC: NORMAL
NITRITE, POC UA: NORMAL
PH, POC UA: 5
PROTEIN, POC: NORMAL
SPECIFIC GRAVITY, POC UA: 1010
UROBILINOGEN, POC UA: NORMAL

## 2022-02-10 PROCEDURE — 4010F ACE/ARB THERAPY RXD/TAKEN: CPT | Mod: CPTII,S$GLB,, | Performed by: UROLOGY

## 2022-02-10 PROCEDURE — 1159F MED LIST DOCD IN RCRD: CPT | Mod: CPTII,S$GLB,, | Performed by: UROLOGY

## 2022-02-10 PROCEDURE — 81001 PR  URINALYSIS, AUTO, W/SCOPE: ICD-10-PCS | Mod: S$GLB,,, | Performed by: UROLOGY

## 2022-02-10 PROCEDURE — 99999 PR PBB SHADOW E&M-EST. PATIENT-LVL III: CPT | Mod: PBBFAC,,, | Performed by: UROLOGY

## 2022-02-10 PROCEDURE — 1160F PR REVIEW ALL MEDS BY PRESCRIBER/CLIN PHARMACIST DOCUMENTED: ICD-10-PCS | Mod: CPTII,S$GLB,, | Performed by: UROLOGY

## 2022-02-10 PROCEDURE — 87086 URINE CULTURE/COLONY COUNT: CPT | Performed by: UROLOGY

## 2022-02-10 PROCEDURE — 81001 URINALYSIS AUTO W/SCOPE: CPT | Mod: S$GLB,,, | Performed by: UROLOGY

## 2022-02-10 PROCEDURE — 99204 PR OFFICE/OUTPT VISIT, NEW, LEVL IV, 45-59 MIN: ICD-10-PCS | Mod: S$GLB,,, | Performed by: UROLOGY

## 2022-02-10 PROCEDURE — 1159F PR MEDICATION LIST DOCUMENTED IN MEDICAL RECORD: ICD-10-PCS | Mod: CPTII,S$GLB,, | Performed by: UROLOGY

## 2022-02-10 PROCEDURE — 3008F PR BODY MASS INDEX (BMI) DOCUMENTED: ICD-10-PCS | Mod: CPTII,S$GLB,, | Performed by: UROLOGY

## 2022-02-10 PROCEDURE — 99204 OFFICE O/P NEW MOD 45 MIN: CPT | Mod: S$GLB,,, | Performed by: UROLOGY

## 2022-02-10 PROCEDURE — 99999 PR PBB SHADOW E&M-EST. PATIENT-LVL III: ICD-10-PCS | Mod: PBBFAC,,, | Performed by: UROLOGY

## 2022-02-10 PROCEDURE — 3008F BODY MASS INDEX DOCD: CPT | Mod: CPTII,S$GLB,, | Performed by: UROLOGY

## 2022-02-10 PROCEDURE — 1160F RVW MEDS BY RX/DR IN RCRD: CPT | Mod: CPTII,S$GLB,, | Performed by: UROLOGY

## 2022-02-10 PROCEDURE — 4010F PR ACE/ARB THEARPY RXD/TAKEN: ICD-10-PCS | Mod: CPTII,S$GLB,, | Performed by: UROLOGY

## 2022-02-10 RX ORDER — CIPROFLOXACIN 500 MG/1
500 TABLET ORAL 2 TIMES DAILY
Qty: 6 TABLET | Refills: 0 | Status: SHIPPED | OUTPATIENT
Start: 2022-02-10 | End: 2022-02-13

## 2022-02-10 NOTE — PROGRESS NOTES
Subjective:       Patient ID: Blas Alcocer is a 61 y.o. male who was referred by Reddy Kong MD    Chief Complaint:   Chief Complaint   Patient presents with    Elevated PSA     PCP ordered lab and psa was abnormal.        Elevated PSA  Patient is here with an elevated PSA. He has no personal history and no family history of prostate cancer. His AUA Symptom Score is 1/1, manifested as irritative symptoms including frequency. He has no prior genitourinary history of hematuria, hematospermia, prostatitis, UTI, erectile dysfunction, urolithiasis, epididymal orchitis, previous  surgery.    He had a prostate biopsy in 2010 by Dr. Sinha when his PSA was 3.2.  This was negative.    Previous PSA values are :  Lab Results   Component Value Date    PSA 4.9 (H) 12/17/2021    PSA 3.2 02/15/2019    PSA 2.3 01/18/2017       IPSS Questionnaire (AUA-7):  Over the past month    1)  How often have you had a sensation of not emptying your bladder completely after you finish urinating?  0 - Not at all   2)  How often have you had to urinate again less than two hours after you finished urinating? 1 - Less than 1 time in 5   3)  How often have you found you stopped and started again several times when you urinated?  0 - Not at all   4) How difficult have you found it to postpone urination?  0 - Not at all   5) How often have you had a weak urinary stream?  0 - Not at all   6) How often have you had to push or strain to begin urination?  0 - Not at all   7) How many times did you most typically get up to urinate from the time you went to bed until the time you got up in the morning?  0 - None   Total score:  0-7 mildly symptomatic    8-19 moderately symptomatic    20-35 severely symptomatic        ACTIVE MEDICAL ISSUES:  Patient Active Problem List   Diagnosis    Hypertriglyceridemia    Prediabetes    Hypovitaminosis D    Hypertension    Sixth nerve palsy of right eye    Brain mass    Wears glasses    Esotropia    Heel  pain, bilateral       PAST MEDICAL HISTORY  Past Medical History:   Diagnosis Date    Dyslipidemia (high LDL; low HDL) 6/13    History of elevated prostate specific antigen (PSA)     Hypertension     Hypovitaminosis D 6/13    Prediabetes 6/13    Sixth nerve palsy of right eye 12/6/2016       PAST SURGICAL HISTORY:  Past Surgical History:   Procedure Laterality Date    PROSTATE BIOPSY      negative       SOCIAL HISTORY:  Social History     Tobacco Use    Smoking status: Never Smoker    Smokeless tobacco: Never Used   Substance Use Topics    Alcohol use: Yes    Drug use: No       FAMILY HISTORY:  Family History   Problem Relation Age of Onset    Dementia Mother     Dementia Father     Diabetes Sister     Hypertension Sister     Diabetes Brother     No Known Problems Son     Dementia Sister     Dementia Sister     No Known Problems Sister     No Known Problems Brother     No Known Problems Brother     No Known Problems Brother     No Known Problems Maternal Aunt     No Known Problems Maternal Uncle     No Known Problems Paternal Aunt     No Known Problems Paternal Uncle     No Known Problems Maternal Grandmother     No Known Problems Maternal Grandfather     No Known Problems Paternal Grandmother     No Known Problems Paternal Grandfather     Blindness Neg Hx     Glaucoma Neg Hx     Retinal detachment Neg Hx     Macular degeneration Neg Hx     Amblyopia Neg Hx     Cancer Neg Hx     Cataracts Neg Hx     Strabismus Neg Hx     Stroke Neg Hx     Thyroid disease Neg Hx        ALLERGIES AND MEDICATIONS: updated and reviewed.  Review of patient's allergies indicates:  No Known Allergies  Current Outpatient Medications   Medication Sig    amLODIPine (NORVASC) 5 MG tablet TAKE TWO TABLETS BY MOUTH EVERY DAY FOR BLOOD PRESSURE    fish oil-omega-3 fatty acids 300-1,000 mg capsule Take 2 g by mouth once daily.    lisinopriL-hydrochlorothiazide (PRINZIDE,ZESTORETIC) 20-12.5 mg per tablet  "Take 1 tablet by mouth once daily.    saw palmetto 80 MG capsule Take 80 mg by mouth once daily.    ciprofloxacin HCl (CIPRO) 500 MG tablet Take 1 tablet (500 mg total) by mouth 2 (two) times daily. for 6 doses     No current facility-administered medications for this visit.       Review of Systems   Constitutional: Negative for activity change, fatigue, fever and unexpected weight change.   HENT: Negative for congestion.    Eyes: Negative for redness.   Respiratory: Negative for chest tightness and shortness of breath.    Cardiovascular: Negative for chest pain and leg swelling.   Gastrointestinal: Negative for abdominal pain, constipation, diarrhea, nausea and vomiting.   Genitourinary: Negative for dysuria, flank pain, frequency, hematuria, penile pain, penile swelling, scrotal swelling, testicular pain and urgency.   Musculoskeletal: Negative for arthralgias and back pain.   Neurological: Negative for dizziness and light-headedness.   Psychiatric/Behavioral: Negative for behavioral problems and confusion. The patient is not nervous/anxious.    All other systems reviewed and are negative.      Objective:      Vitals:    02/10/22 1120   Weight: 90.5 kg (199 lb 6.5 oz)   Height: 5' 9" (1.753 m)     Physical Exam  Vitals and nursing note reviewed.   Constitutional:       Appearance: He is well-developed and well-nourished.   HENT:      Head: Normocephalic.   Eyes:      Conjunctiva/sclera: Conjunctivae normal.   Neck:      Thyroid: No thyromegaly.      Trachea: No tracheal deviation.   Cardiovascular:      Rate and Rhythm: Normal rate.      Heart sounds: Normal heart sounds.   Pulmonary:      Effort: Pulmonary effort is normal. No respiratory distress.      Breath sounds: Normal breath sounds. No wheezing.   Abdominal:      General: There is no distension.      Palpations: Abdomen is soft. There is no hepatosplenomegaly or mass.      Tenderness: There is no abdominal tenderness. There is no CVA tenderness, guarding " or rebound.      Hernia: No hernia is present. There is no hernia in the right inguinal area or left inguinal area.   Genitourinary:     Penis: Normal.       Testes: Normal.         Right: Mass or tenderness not present.         Left: Mass or tenderness not present.      Prostate: Enlarged. Not tender.      Rectum: Normal. No mass, tenderness or external hemorrhoid.      Comments: 40 gm smooth  Musculoskeletal:         General: No tenderness or edema.      Cervical back: Normal range of motion.   Lymphadenopathy:      Lower Body: No right inguinal adenopathy. No left inguinal adenopathy.   Skin:     General: Skin is warm and dry.      Findings: No erythema or rash.   Neurological:      Mental Status: He is alert and oriented to person, place, and time.   Psychiatric:         Mood and Affect: Mood and affect normal.         Behavior: Behavior normal.         Thought Content: Thought content normal.         Judgment: Judgment normal.         Urine dipstick shows negative for all components.  Micro exam: negative for WBC's or RBC's.    Assessment:       1. Elevated PSA    2. BPH without obstruction/lower urinary tract symptoms    3. Urinary frequency          Plan:       1. Elevated PSA  We discussed an MRI since had a biopsy previously, he wants to proceed with a biopsy.    - ciprofloxacin HCl (CIPRO) 500 MG tablet; Take 1 tablet (500 mg total) by mouth 2 (two) times daily. for 6 doses  Dispense: 6 tablet; Refill: 0  - US Biopsy Prostate Singl Multi Specimens (xpd); Future    2. BPH without obstruction/lower urinary tract symptoms    - POCT urinalysis, dipstick or tablet reag    3. Urinary frequency    - Urine culture            Follow up for Prostate Biopsy.

## 2022-02-11 ENCOUNTER — PATIENT MESSAGE (OUTPATIENT)
Dept: ENDOSCOPY | Facility: HOSPITAL | Age: 62
End: 2022-02-11
Payer: COMMERCIAL

## 2022-02-11 ENCOUNTER — OFFICE VISIT (OUTPATIENT)
Dept: ENDOCRINOLOGY | Facility: CLINIC | Age: 62
End: 2022-02-11
Payer: COMMERCIAL

## 2022-02-11 VITALS
WEIGHT: 197.38 LBS | SYSTOLIC BLOOD PRESSURE: 115 MMHG | HEART RATE: 86 BPM | DIASTOLIC BLOOD PRESSURE: 74 MMHG | BODY MASS INDEX: 29.15 KG/M2

## 2022-02-11 DIAGNOSIS — Z12.11 SPECIAL SCREENING FOR MALIGNANT NEOPLASMS, COLON: Primary | ICD-10-CM

## 2022-02-11 DIAGNOSIS — Z92.3 STATUS POST GAMMA KNIFE TREATMENT: ICD-10-CM

## 2022-02-11 DIAGNOSIS — E05.90 SUBCLINICAL HYPERTHYROIDISM: Primary | ICD-10-CM

## 2022-02-11 DIAGNOSIS — Z01.818 PRE-OP TESTING: ICD-10-CM

## 2022-02-11 PROCEDURE — 99999 PR PBB SHADOW E&M-EST. PATIENT-LVL III: CPT | Mod: PBBFAC,,, | Performed by: HOSPITALIST

## 2022-02-11 PROCEDURE — 99204 PR OFFICE/OUTPT VISIT, NEW, LEVL IV, 45-59 MIN: ICD-10-PCS | Mod: S$GLB,,, | Performed by: HOSPITALIST

## 2022-02-11 PROCEDURE — 3008F BODY MASS INDEX DOCD: CPT | Mod: CPTII,S$GLB,, | Performed by: HOSPITALIST

## 2022-02-11 PROCEDURE — 3078F PR MOST RECENT DIASTOLIC BLOOD PRESSURE < 80 MM HG: ICD-10-PCS | Mod: CPTII,S$GLB,, | Performed by: HOSPITALIST

## 2022-02-11 PROCEDURE — 4010F ACE/ARB THERAPY RXD/TAKEN: CPT | Mod: CPTII,S$GLB,, | Performed by: HOSPITALIST

## 2022-02-11 PROCEDURE — 99204 OFFICE O/P NEW MOD 45 MIN: CPT | Mod: S$GLB,,, | Performed by: HOSPITALIST

## 2022-02-11 PROCEDURE — 4010F PR ACE/ARB THEARPY RXD/TAKEN: ICD-10-PCS | Mod: CPTII,S$GLB,, | Performed by: HOSPITALIST

## 2022-02-11 PROCEDURE — 3074F PR MOST RECENT SYSTOLIC BLOOD PRESSURE < 130 MM HG: ICD-10-PCS | Mod: CPTII,S$GLB,, | Performed by: HOSPITALIST

## 2022-02-11 PROCEDURE — 3078F DIAST BP <80 MM HG: CPT | Mod: CPTII,S$GLB,, | Performed by: HOSPITALIST

## 2022-02-11 PROCEDURE — 1159F MED LIST DOCD IN RCRD: CPT | Mod: CPTII,S$GLB,, | Performed by: HOSPITALIST

## 2022-02-11 PROCEDURE — 1159F PR MEDICATION LIST DOCUMENTED IN MEDICAL RECORD: ICD-10-PCS | Mod: CPTII,S$GLB,, | Performed by: HOSPITALIST

## 2022-02-11 PROCEDURE — 1160F PR REVIEW ALL MEDS BY PRESCRIBER/CLIN PHARMACIST DOCUMENTED: ICD-10-PCS | Mod: CPTII,S$GLB,, | Performed by: HOSPITALIST

## 2022-02-11 PROCEDURE — 3074F SYST BP LT 130 MM HG: CPT | Mod: CPTII,S$GLB,, | Performed by: HOSPITALIST

## 2022-02-11 PROCEDURE — 1160F RVW MEDS BY RX/DR IN RCRD: CPT | Mod: CPTII,S$GLB,, | Performed by: HOSPITALIST

## 2022-02-11 PROCEDURE — 3008F PR BODY MASS INDEX (BMI) DOCUMENTED: ICD-10-PCS | Mod: CPTII,S$GLB,, | Performed by: HOSPITALIST

## 2022-02-11 PROCEDURE — 99999 PR PBB SHADOW E&M-EST. PATIENT-LVL III: ICD-10-PCS | Mod: PBBFAC,,, | Performed by: HOSPITALIST

## 2022-02-11 RX ORDER — POLYETHYLENE GLYCOL 3350, SODIUM SULFATE ANHYDROUS, SODIUM BICARBONATE, SODIUM CHLORIDE, POTASSIUM CHLORIDE 236; 22.74; 6.74; 5.86; 2.97 G/4L; G/4L; G/4L; G/4L; G/4L
4 POWDER, FOR SOLUTION ORAL ONCE
Qty: 4000 ML | Refills: 0 | Status: SHIPPED | OUTPATIENT
Start: 2022-02-11 | End: 2022-02-11

## 2022-02-11 NOTE — PROGRESS NOTES
Subjective:      Patient ID: Blas Alcocer is a 61 y.o. male presented to [facility] on 2/11/2022.  Chief Complaint:  No chief complaint on file.      History of Present Illness: Blas Alcocer is a 61 y.o. 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        First diagnosed: 2015?, results shows decrease in TSH from 8992-7070.  Possibly worsen  post Gamma Knife treatment 2018  Patient denies any symptoms of hyperthyroidism including palpitation, tremors, weight loss  Patient reports fatigue off and on otherwise does have issue with some weight gain  No hypotension no polyuria, polydipsia    Patient denies headache, blurred vision    No family history of thyroid disorder that he can recall  Besides from Gamma Knife no other radiation exposure    Denies any use of:  Amiodarone/lithium  No new medication.  Compliant with blood pressure medication amlodipine, lisinopril    In the past had testosterone evaluated by PCP:  Normal, of note prior to gamma knife  Did have 2 COVID infection asymptomatic per patient last 12/21    Herbal/Vitamin/Supplement:  Denies take Kelp, Iodine, Biotin, Selenium, Sea sarabia, Bladder wrack  Does take saw palmetto for prostate  Was going to take weight loss supplements.  On hold at this    Of note does have prediabetes A1c 5.7    Lab Results   Component Value Date    TSH 0.236 (L) 12/17/2021    TSH 0.341 (L) 12/02/2019    TSH 0.348 (L) 02/15/2019    TSH 0.437 01/12/2018    FREET4 0.91 12/17/2021    FREET4 0.89 12/02/2019    FREET4 1.00 02/15/2019    FREET4 0.95 06/23/2015       Reviewed past surgical, medical, family, social history and updated as appropriate.    Review of Systems: see HPI above    Objective:   /74 (BP Location:  Right arm, Patient Position: Sitting, BP Method: Medium (Automatic))   Pulse 86   Wt 89.5 kg (197 lb 6.4 oz)   BMI 29.15 kg/m²     Body mass index is 29.15 kg/m².  Vital signs reviewed    Physical Exam  Vitals and nursing note reviewed.   Constitutional:       General: He is not in acute distress.     Appearance: Normal appearance. He is well-developed. He is obese. He is not toxic-appearing.   Neck:      Thyroid: No thyromegaly.      Comments: No goiter noted  Cardiovascular:      Heart sounds: Normal heart sounds.   Pulmonary:      Effort: Pulmonary effort is normal. No respiratory distress.   Abdominal:      Tenderness: There is no abdominal tenderness.   Musculoskeletal:         General: No deformity. Normal range of motion.      Cervical back: Normal range of motion.   Skin:     Findings: No bruising.   Neurological:      Mental Status: He is alert and oriented to person, place, and time.   Psychiatric:         Mood and Affect: Mood normal.         Lab Reviewed:   Lab Results   Component Value Date    HGBA1C 5.7 (H) 12/17/2021       Lab Results   Component Value Date    CHOL 206 (H) 12/17/2021    HDL 47 12/17/2021    LDLCALC 141.0 12/17/2021    TRIG 90 12/17/2021    CHOLHDL 22.8 12/17/2021       Lab Results   Component Value Date     12/17/2021    K 4.1 12/17/2021     12/17/2021    CO2 27 12/17/2021     12/17/2021    BUN 15 12/17/2021    CREATININE 1.0 12/17/2021    CALCIUM 9.4 12/17/2021    PROT 6.8 12/17/2021    ALBUMIN 4.2 12/17/2021    BILITOT 1.1 (H) 12/17/2021    ALKPHOS 55 12/17/2021    AST 18 12/17/2021    ALT 21 12/17/2021    ANIONGAP 9 12/17/2021    ESTGFRAFRICA >60 12/17/2021    EGFRNONAA >60 12/17/2021    TSH 0.236 (L) 12/17/2021        Lab Results   Component Value Date    LSXWMLOT88DR 23 (L) 01/05/2016    PYODEFKK74AC 35 10/25/2013    KPSQBPBK27AA 25 (L) 06/18/2013    CALCIUM 9.4 12/17/2021    CALCIUM 9.3 06/17/2020    CALCIUM 10.1 12/02/2019    ALKPHOS 55 12/17/2021     ALKPHOS 52 (L) 06/17/2020    ALKPHOS 54 (L) 12/02/2019    TSH 0.236 (L) 12/17/2021       Assessment     1. Subclinical hyperthyroidism  TSH    T4, Free    T3    Thyroid Peroxidase Antibody    Thyroid Stimulating Immunoglobulin    Comprehensive Metabolic Panel    Follicle Stimulating Hormone    Luteinizing Hormone    ACTH    Cortisol, 8AM    Testosterone Panel   2. Status post gamma knife treatment  Follicle Stimulating Hormone    Luteinizing Hormone    ACTH    Cortisol, 8AM    Testosterone Panel        Plan       Subclinical hyperthyroidism  - patient with low TSH, normal free T4, suspect subclinical hyperthyroidism versus secondary dysfunction in setting of gamma knife?   - overall asymptomatic.  Does complaining of some weight gain  - at this time we will stop all some herbal supplement he is taking.  Advise not taking new dietary supplements as this can also affect thyroid function  - we will screen for other pituitary dysfunction, albeit low suspicion given no major symptoms above  - we will screen for TPO, TSI  - if primary hyperthyroidism is proven, will consider uptake scan  - no goiter appreciated on exam  - all this were explained to patient.  Follow-up in 2 months after lab      Advised patient to follow up with PCP for routine health maintenance care.   RTC in 2      Mckinley Romero M.D.  Endocrinology  [facility]  2/11/2022      Disclaimer: This note has been generated in part with the use of voice-recognition software. There may be typographical errors that have been missed during proof-reading.

## 2022-02-12 LAB — BACTERIA UR CULT: NORMAL

## 2022-02-15 ENCOUNTER — PROCEDURE VISIT (OUTPATIENT)
Dept: UROLOGY | Facility: CLINIC | Age: 62
End: 2022-02-15
Attending: UROLOGY
Payer: COMMERCIAL

## 2022-02-15 VITALS — BODY MASS INDEX: 29.07 KG/M2 | WEIGHT: 196.88 LBS

## 2022-02-15 DIAGNOSIS — R97.20 ELEVATED PSA: Primary | ICD-10-CM

## 2022-02-15 PROCEDURE — 88305 TISSUE EXAM BY PATHOLOGIST: CPT | Mod: 59 | Performed by: PATHOLOGY

## 2022-02-15 PROCEDURE — 55700 TRUS: ICD-10-PCS | Mod: S$GLB,,, | Performed by: UROLOGY

## 2022-02-15 PROCEDURE — 88305 TISSUE EXAM BY PATHOLOGIST: CPT | Mod: 26,,, | Performed by: PATHOLOGY

## 2022-02-15 PROCEDURE — 76872 TRUS: ICD-10-PCS | Mod: S$GLB,,, | Performed by: UROLOGY

## 2022-02-15 PROCEDURE — 88305 TISSUE EXAM BY PATHOLOGIST: ICD-10-PCS | Mod: 26,,, | Performed by: PATHOLOGY

## 2022-02-15 PROCEDURE — 76872 US TRANSRECTAL: CPT | Mod: S$GLB,,, | Performed by: UROLOGY

## 2022-02-15 PROCEDURE — 55700 TRUS: CPT | Mod: S$GLB,,, | Performed by: UROLOGY

## 2022-02-15 RX ORDER — GENTAMICIN SULFATE 40 MG/ML
80 INJECTION, SOLUTION INTRAMUSCULAR; INTRAVENOUS
Status: SHIPPED | OUTPATIENT
Start: 2022-02-15

## 2022-02-15 NOTE — PROCEDURES
"TRUS    Date/Time: 2/15/2022 2:00 PM  Performed by: ERIN Mckeon MD  Authorized by: ERIN Mckeon MD     Consent Done?:  Yes (Written)  Time out: Immediately prior to procedure a "time out" was called to verify the correct patient, procedure, equipment, support staff and site/side marked as required.    Indications: Elevated PSA    Preparation: Patient was prepped and draped in usual sterile fashion    Position:  Left lateral  Anesthesia:  10cc's 1% Lidocaine and Lidocaine jelly  Prostate Size:  85 cc  Lesions:: No    Left Base Biopsies: 2  Left Mid Biopsies: 2  Left [place] Biopsies: 2  Right Base Biopsies: 2  Right Mid Biopsies: 2  Right [place] Biopsies: 2  Transitional zone: No    Total Biopsies:  12    Patient tolerance:  Patient tolerated the procedure well with no immediate complications     PSA 4.9      "

## 2022-02-18 ENCOUNTER — TELEPHONE (OUTPATIENT)
Dept: UROLOGY | Facility: CLINIC | Age: 62
End: 2022-02-18
Payer: COMMERCIAL

## 2022-02-18 ENCOUNTER — PATIENT MESSAGE (OUTPATIENT)
Dept: UROLOGY | Facility: CLINIC | Age: 62
End: 2022-02-18
Payer: COMMERCIAL

## 2022-02-18 DIAGNOSIS — R97.20 ELEVATED PSA: Primary | ICD-10-CM

## 2022-02-18 LAB
FINAL PATHOLOGIC DIAGNOSIS: NORMAL
GROSS: NORMAL
Lab: NORMAL

## 2022-02-18 NOTE — TELEPHONE ENCOUNTER
Attempted o contact pt regarding pathology results, LVM. Message was also sent  On [employer] by provider.

## 2022-02-18 NOTE — TELEPHONE ENCOUNTER
I will want to check a PSA in 6 months.  I will place the orders and have my office schedule an appointment.    Call if you are having problems.    Dr. Mckeon

## 2022-03-07 ENCOUNTER — PATIENT MESSAGE (OUTPATIENT)
Dept: UROLOGY | Facility: CLINIC | Age: 62
End: 2022-03-07
Payer: COMMERCIAL

## 2022-03-14 ENCOUNTER — PATIENT MESSAGE (OUTPATIENT)
Dept: ADMINISTRATIVE | Facility: OTHER | Age: 62
End: 2022-03-14
Payer: COMMERCIAL

## 2022-03-15 ENCOUNTER — LAB VISIT (OUTPATIENT)
Dept: FAMILY MEDICINE | Facility: CLINIC | Age: 62
End: 2022-03-15
Payer: COMMERCIAL

## 2022-03-15 DIAGNOSIS — Z01.818 PRE-OP TESTING: ICD-10-CM

## 2022-03-15 LAB
SARS-COV-2 RNA RESP QL NAA+PROBE: NOT DETECTED
SARS-COV-2- CYCLE NUMBER: NORMAL

## 2022-03-15 PROCEDURE — U0003 INFECTIOUS AGENT DETECTION BY NUCLEIC ACID (DNA OR RNA); SEVERE ACUTE RESPIRATORY SYNDROME CORONAVIRUS 2 (SARS-COV-2) (CORONAVIRUS DISEASE [COVID-19]), AMPLIFIED PROBE TECHNIQUE, MAKING USE OF HIGH THROUGHPUT TECHNOLOGIES AS DESCRIBED BY CMS-2020-01-R: HCPCS | Performed by: FAMILY MEDICINE

## 2022-03-15 PROCEDURE — U0005 INFEC AGEN DETEC AMPLI PROBE: HCPCS | Performed by: FAMILY MEDICINE

## 2022-03-17 ENCOUNTER — ANESTHESIA EVENT (OUTPATIENT)
Dept: ENDOSCOPY | Facility: HOSPITAL | Age: 62
End: 2022-03-17
Payer: COMMERCIAL

## 2022-03-18 ENCOUNTER — ANESTHESIA (OUTPATIENT)
Dept: ENDOSCOPY | Facility: HOSPITAL | Age: 62
End: 2022-03-18
Payer: COMMERCIAL

## 2022-03-18 ENCOUNTER — HOSPITAL ENCOUNTER (OUTPATIENT)
Facility: HOSPITAL | Age: 62
Discharge: HOME | End: 2022-03-18
Attending: INTERNAL MEDICINE | Admitting: INTERNAL MEDICINE
Payer: COMMERCIAL

## 2022-03-18 VITALS
OXYGEN SATURATION: 94 % | RESPIRATION RATE: 18 BRPM | TEMPERATURE: 98 F | HEART RATE: 70 BPM | SYSTOLIC BLOOD PRESSURE: 125 MMHG | DIASTOLIC BLOOD PRESSURE: 83 MMHG

## 2022-03-18 DIAGNOSIS — Z12.11 COLON CANCER SCREENING: ICD-10-CM

## 2022-03-18 PROCEDURE — 63600175 PHARM REV CODE 636 W HCPCS: Performed by: NURSE ANESTHETIST, CERTIFIED REGISTERED

## 2022-03-18 PROCEDURE — 45380 COLONOSCOPY AND BIOPSY: CPT | Mod: 59,PT,, | Performed by: INTERNAL MEDICINE

## 2022-03-18 PROCEDURE — 88305 TISSUE EXAM BY PATHOLOGIST: CPT | Mod: 26,,, | Performed by: PATHOLOGY

## 2022-03-18 PROCEDURE — 45385 COLONOSCOPY W/LESION REMOVAL: CPT | Mod: PT,,, | Performed by: INTERNAL MEDICINE

## 2022-03-18 PROCEDURE — 37000009 HC ANESTHESIA EA ADD 15 MINS: Performed by: INTERNAL MEDICINE

## 2022-03-18 PROCEDURE — 27201089 HC SNARE, DISP (ANY): Performed by: INTERNAL MEDICINE

## 2022-03-18 PROCEDURE — 45380 COLONOSCOPY AND BIOPSY: CPT | Mod: 59,PT | Performed by: INTERNAL MEDICINE

## 2022-03-18 PROCEDURE — D9220A PRA ANESTHESIA: Mod: 33,,, | Performed by: ANESTHESIOLOGY

## 2022-03-18 PROCEDURE — 88305 TISSUE EXAM BY PATHOLOGIST: ICD-10-PCS | Mod: 26,,, | Performed by: PATHOLOGY

## 2022-03-18 PROCEDURE — 45385 COLONOSCOPY W/LESION REMOVAL: CPT | Mod: PT | Performed by: INTERNAL MEDICINE

## 2022-03-18 PROCEDURE — 45380 PR COLONOSCOPY,BIOPSY: ICD-10-PCS | Mod: 59,PT,, | Performed by: INTERNAL MEDICINE

## 2022-03-18 PROCEDURE — D9220A PRA ANESTHESIA: ICD-10-PCS | Mod: 33,,, | Performed by: ANESTHESIOLOGY

## 2022-03-18 PROCEDURE — 88305 TISSUE EXAM BY PATHOLOGIST: CPT | Performed by: PATHOLOGY

## 2022-03-18 PROCEDURE — 45385 PR COLONOSCOPY,REMV LESN,SNARE: ICD-10-PCS | Mod: PT,,, | Performed by: INTERNAL MEDICINE

## 2022-03-18 PROCEDURE — 25000003 PHARM REV CODE 250: Performed by: NURSE ANESTHETIST, CERTIFIED REGISTERED

## 2022-03-18 PROCEDURE — 37000008 HC ANESTHESIA 1ST 15 MINUTES: Performed by: INTERNAL MEDICINE

## 2022-03-18 PROCEDURE — 27201012 HC FORCEPS, HOT/COLD, DISP: Performed by: INTERNAL MEDICINE

## 2022-03-18 RX ORDER — LIDOCAINE HYDROCHLORIDE 10 MG/ML
1 INJECTION, SOLUTION EPIDURAL; INFILTRATION; INTRACAUDAL; PERINEURAL ONCE
Status: DISCONTINUED | OUTPATIENT
Start: 2022-03-18 | End: 2022-03-18 | Stop reason: HOSPADM

## 2022-03-18 RX ORDER — LIDOCAINE HYDROCHLORIDE 20 MG/ML
INJECTION INTRAVENOUS
Status: DISCONTINUED | OUTPATIENT
Start: 2022-03-18 | End: 2022-03-18

## 2022-03-18 RX ORDER — PROPOFOL 10 MG/ML
INJECTION, EMULSION INTRAVENOUS
Status: DISCONTINUED
Start: 2022-03-18 | End: 2022-03-18 | Stop reason: HOSPADM

## 2022-03-18 RX ORDER — SODIUM CHLORIDE 9 MG/ML
INJECTION, SOLUTION INTRAVENOUS CONTINUOUS
Status: DISCONTINUED | OUTPATIENT
Start: 2022-03-18 | End: 2022-03-18 | Stop reason: HOSPADM

## 2022-03-18 RX ORDER — PROPOFOL 10 MG/ML
VIAL (ML) INTRAVENOUS
Status: DISCONTINUED | OUTPATIENT
Start: 2022-03-18 | End: 2022-03-18

## 2022-03-18 RX ORDER — LIDOCAINE HYDROCHLORIDE 20 MG/ML
INJECTION, SOLUTION EPIDURAL; INFILTRATION; INTRACAUDAL; PERINEURAL
Status: DISCONTINUED
Start: 2022-03-18 | End: 2022-03-18 | Stop reason: HOSPADM

## 2022-03-18 RX ADMIN — LIDOCAINE HYDROCHLORIDE 60 MG: 20 INJECTION, SOLUTION INTRAVENOUS at 03:03

## 2022-03-18 RX ADMIN — PROPOFOL 30 MG: 10 INJECTION, EMULSION INTRAVENOUS at 03:03

## 2022-03-18 RX ADMIN — PROPOFOL 100 MG: 10 INJECTION, EMULSION INTRAVENOUS at 03:03

## 2022-03-18 RX ADMIN — PROPOFOL 20 MG: 10 INJECTION, EMULSION INTRAVENOUS at 03:03

## 2022-03-18 RX ADMIN — SODIUM CHLORIDE: 0.9 INJECTION, SOLUTION INTRAVENOUS at 03:03

## 2022-03-18 NOTE — ANESTHESIA PREPROCEDURE EVALUATION
03/18/2022  Blas Alcocer is a 61 y.o., male.      Pre-op Assessment    I have reviewed the Patient Summary Reports.     I have reviewed the Nursing Notes.       Review of Systems  Anesthesia Hx:  No problems with previous Anesthesia  Denies Family Hx of Anesthesia complications.   Denies Personal Hx of Anesthesia complications.   Social:  Non-Smoker, Alcohol Use    Cardiovascular:   Exercise tolerance: good Hypertension Denies MI.   Denies Dysrhythmias.  hyperlipidemia    Pulmonary:  Pulmonary Normal    Renal/:  Renal/ Normal     Hepatic/GI:  Hepatic/GI Normal    Neurological:   Neuromuscular Disease, ?hemangioma on MRI   Endocrine:  Endocrine Normal        Physical Exam  General: Well nourished, Cooperative and Alert    Airway:  Mallampati: II   Mouth Opening: Normal  TM Distance: 4 - 6 cm  Tongue: Normal  Neck ROM: Normal ROM    Dental:  Intact    Chest/Lungs:  Normal Respiratory Rate, Clear to auscultation    Heart:  Rate: Normal  Rhythm: Regular Rhythm       Wt Readings from Last 3 Encounters:   02/15/22 89.3 kg (196 lb 13.9 oz)   02/11/22 89.5 kg (197 lb 6.4 oz)   02/10/22 90.5 kg (199 lb 6.5 oz)     Temp Readings from Last 3 Encounters:   12/13/21 36.5 °C (97.7 °F)   12/18/20 36.4 °C (97.5 °F) (Oral)   12/02/19 36.4 °C (97.5 °F) (Oral)     BP Readings from Last 3 Encounters:   02/11/22 115/74   12/13/21 130/82   12/18/20 104/76     Pulse Readings from Last 3 Encounters:   02/11/22 86   12/13/21 60   12/18/20 71     3/15/2022 COVID negative    Anesthesia Plan  Type of Anesthesia, risks & benefits discussed:    Anesthesia Type: Gen Natural Airway, MAC  Intra-op Monitoring Plan: Standard ASA Monitors  Post Op Pain Control Plan: multimodal analgesia and IV/PO Opioids PRN  Induction:  IV  Informed Consent: Informed consent signed with the Patient and all parties understand the risks and agree with  anesthesia plan.  All questions answered.   ASA Score: 2  Day of Surgery Review of History & Physical: H&P Update referred to the surgeon/provider.    Ready For Surgery From Anesthesia Perspective.     .

## 2022-03-18 NOTE — TRANSFER OF CARE
Anesthesia Transfer of Care Note    Patient: Blas Alcocer    Procedure(s) Performed: Procedure(s) (LRB):  COLONOSCOPY (N/A)    Patient location: GI    Anesthesia Type: general    Transport from OR: Transported from OR on room air with adequate spontaneous ventilation    Post pain: adequate analgesia    Post assessment: tolerated procedure well and no apparent anesthetic complications    Post vital signs: stable    Level of consciousness: sedated    Nausea/Vomiting: no nausea/vomiting    Complications: none    Transfer of care protocol was followed      Last vitals:   Visit Vitals  /61 (BP Location: Left arm, Patient Position: Lying)   Pulse 85   Temp 36.7 °C (98.1 °F) (Oral)   Resp 17   SpO2 (!) 94%

## 2022-03-18 NOTE — OR NURSING
PROCEDURE AND RECOVERY COMPLETED. DR. JOE DISCUSSED RESULTS  WITH PATIENT AND SPOUSE. DISCHARGE INSTRUCTIONS GIVEN AND UNDERSTANDING VERBALIZED . NAD NOTED. PATIENT READY FOR DISCHARGE.

## 2022-03-18 NOTE — H&P
Short Stay Endoscopy History and Physical    PCP - Reddy Kong MD  Referring Physician - Reddy Kong MD  [address]  [place] [id number]    Procedure - colonoscopy  ASA - per anesthesia  Mallampati - per anesthesia  History of Anesthesia problems - no  Family history Anesthesia problems -  no   Plan of anesthesia - General    HPI:  This is a 61 y.o. male here for evaluation of: screening    Reflux - no  Dysphagia - no  Abdominal pain - no  Diarrhea - no    ROS:  Constitutional: No fevers, chills, No weight loss  CV: No chest pain  Pulm: No cough, No shortness of breath  GI: see HPI    Medical History:  has a past medical history of Dyslipidemia (high LDL; low HDL) (6/13), History of elevated prostate specific antigen (PSA), Hypertension, Hypovitaminosis D (6/13), Prediabetes (6/13), and Sixth nerve palsy of right eye (12/6/2016).    Surgical History:  has a past surgical history that includes Prostate biopsy.    Family History: family history includes Dementia in his father, mother, sister, and sister; Diabetes in his brother and sister; Hypertension in his sister; No Known Problems in his brother, brother, brother, maternal aunt, maternal grandfather, maternal grandmother, maternal uncle, paternal aunt, paternal grandfather, paternal grandmother, paternal uncle, sister, and son..    Social History:  reports that he has never smoked. He has never used smokeless tobacco. He reports current alcohol use. He reports that he does not use drugs.    Review of patient's allergies indicates:  No Known Allergies    Medications:   Facility-Administered Medications Prior to Admission   Medication Dose Route Frequency Provider Last Rate Last Admin    gentamicin injection 80 mg  80 mg Intramuscular 1 time in Clinic/HOD ERIN Mckeon MD         Medications Prior to Admission   Medication Sig Dispense Refill Last Dose    amLODIPine (NORVASC) 5 MG tablet TAKE TWO TABLETS BY MOUTH EVERY DAY FOR BLOOD  PRESSURE 90 tablet 3 3/18/2022 at Unknown time    fish oil-omega-3 fatty acids 300-1,000 mg capsule Take 2 g by mouth once daily.   Past Week at Unknown time    lisinopriL-hydrochlorothiazide (PRINZIDE,ZESTORETIC) 20-12.5 mg per tablet Take 1 tablet by mouth once daily. 90 tablet 3 3/18/2022 at Unknown time    saw palmetto 80 MG capsule Take 80 mg by mouth once daily.   Past Week at Unknown time       Physical Exam:    Vital Signs: There were no vitals filed for this visit.    General Appearance: Well appearing in no acute distress  Eyes:    No scleral icterus  Lungs: no labored breathing  Heart:  Regular  Abdomen: non tender    Labs:  Lab Results   Component Value Date    WBC 7.26 12/17/2021    HGB 16.6 12/17/2021    HCT 48.7 12/17/2021     12/17/2021    CHOL 206 (H) 12/17/2021    TRIG 90 12/17/2021    HDL 47 12/17/2021    ALT 21 12/17/2021    AST 18 12/17/2021     12/17/2021    K 4.1 12/17/2021     12/17/2021    CREATININE 1.0 12/17/2021    BUN 15 12/17/2021    CO2 27 12/17/2021    TSH 0.236 (L) 12/17/2021    PSA 4.9 (H) 12/17/2021    INR 1.1 09/04/2009    HGBA1C 5.7 (H) 12/17/2021       I have explained the risks and benefits of this endoscopic procedure to the patient including but not limited to bleeding, inflammation, infection, perforation, and death.      Farzad Sow MD

## 2022-03-18 NOTE — PROVATION PATIENT INSTRUCTIONS
Discharge Summary/Instructions after an Endoscopic Procedure  Patient Name: Blas Alcocer  Patient MRN: [id number]  Patient YOB: 1960 Friday, March 18, 2022  Farzad Sow MD  Dear patient,  As a result of recent federal legislation (The Federal Cures Act), you may   receive lab or pathology results from your procedure in your [facility]   account before your physician is able to contact you. Your physician or   their representative will relay the results to you with their   recommendations at their soonest availability.  Thank you,  RESTRICTIONS:  During your procedure today, you received medications for sedation.  These   medications may affect your judgment, balance and coordination.  Therefore,   for 24 hours, you have the following restrictions:   - DO NOT drive a car, operate machinery, make legal/financial decisions,   sign important papers or drink alcohol.    ACTIVITY:  Today: no heavy lifting, straining or running due to procedural   sedation/anesthesia.  The following day: return to full activity including work.  DIET:  Eat and drink normally unless instructed otherwise.     TREATMENT FOR COMMON SIDE EFFECTS:  - Mild abdominal pain, nausea, belching, bloating or excessive gas:  rest,   eat lightly and use a heating pad.  - Sore Throat: treat with throat lozenges and/or gargle with warm salt   water.  - Because air was used during the procedure, expelling large amounts of air   from your rectum or belching is normal.  - If a bowel prep was taken, you may not have a bowel movement for 1-3 days.    This is normal.  SYMPTOMS TO WATCH FOR AND REPORT TO YOUR PHYSICIAN:  1. Abdominal pain or bloating, other than gas cramps.  2. Chest pain.  3. Back pain.  4. Signs of infection such as: chills or fever occurring within 24 hours   after the procedure.  5. Rectal bleeding, which would show as bright red, maroon, or black stools.   (A tablespoon of blood from the rectum is not serious, especially if    hemorrhoids are present.)  6. Vomiting.  7. Weakness or dizziness.  GO DIRECTLY TO THE NEAREST EMERGENCY ROOM IF YOU HAVE ANY OF THE FOLLOWING:      Difficulty breathing              Chills and/or fever over 101 F   Persistent vomiting and/or vomiting blood   Severe abdominal pain   Severe chest pain   Black, tarry stools   Bleeding- more than one tablespoon   Any other symptom or condition that you feel may need urgent attention  Your doctor recommends these additional instructions:  If any biopsies were taken, your doctors clinic will contact you in 1 to 2   weeks with any results.  - Discharge patient to home (ambulatory).   - Patient has a contact number available for emergencies.  The signs and   symptoms of potential delayed complications were discussed with the   patient.  Return to normal activities tomorrow.  Written discharge   instructions were provided to the patient.   - Resume previous diet.   - Continue present medications.   - Await pathology results.   - Repeat colonoscopy in 7 years for surveillance based on pathology results.     - Return to primary care physician as previously scheduled.  For questions, problems or results please call your physician - Farzad Sow MD at Work:  [phone].  [facility] can be reached at [phone]     IF A COMPLICATION OR EMERGENCY SITUATION ARISES AND YOU ARE UNABLE TO REACH   YOUR PHYSICIAN - GO DIRECTLY TO THE EMERGENCY ROOM.  Farzad Sow MD  3/18/2022 3:52:57 PM  This report has been verified and signed electronically.  Dear patient,  As a result of recent federal legislation (The Federal Cures Act), you may   receive lab or pathology results from your procedure in your [facility]   account before your physician is able to contact you. Your physician or   their representative will relay the results to you with their   recommendations at their soonest availability.  Thank you,  PROVATION

## 2022-03-19 NOTE — ANESTHESIA POSTPROCEDURE EVALUATION
Anesthesia Post Evaluation    Patient: Blas Alcocer    Procedure(s) Performed: Procedure(s) (LRB):  COLONOSCOPY (N/A)    Final Anesthesia Type: general      Patient location during evaluation: GI PACU  Patient participation: Yes- Able to Participate  Level of consciousness: awake and alert and oriented  Post-procedure vital signs: reviewed and stable  Pain management: adequate  Airway patency: patent    PONV status at discharge: No PONV  Anesthetic complications: no      Cardiovascular status: hemodynamically stable and blood pressure returned to baseline  Respiratory status: spontaneous ventilation, room air and unassisted  Hydration status: euvolemic  Follow-up not needed.          Vitals Value Taken Time   /83 03/18/22 1615   Temp 36.7 °C (98.1 °F) 03/18/22 1545   Pulse 70 03/18/22 1615   Resp 18 03/18/22 1615   SpO2 94 % 03/18/22 1615         Event Time   Out of Recovery 16:15:00         Pain/Sebastian Score: Sebastian Score: 10 (3/18/2022  4:15 PM)

## 2022-03-22 ENCOUNTER — PATIENT MESSAGE (OUTPATIENT)
Dept: GASTROENTEROLOGY | Facility: CLINIC | Age: 62
End: 2022-03-22
Payer: COMMERCIAL

## 2022-03-22 LAB
FINAL PATHOLOGIC DIAGNOSIS: NORMAL
GROSS: NORMAL
Lab: NORMAL

## 2022-03-22 NOTE — TELEPHONE ENCOUNTER
Based on the size, number, and type(s) of polyps you had, your next colonoscopy should be in 7 years.

## 2022-03-23 ENCOUNTER — OFFICE VISIT (OUTPATIENT)
Dept: UROLOGY | Facility: CLINIC | Age: 62
End: 2022-03-23
Payer: COMMERCIAL

## 2022-03-23 VITALS — BODY MASS INDEX: 29.39 KG/M2 | HEIGHT: 69 IN | WEIGHT: 198.44 LBS

## 2022-03-23 DIAGNOSIS — R97.20 ELEVATED PSA: Primary | ICD-10-CM

## 2022-03-23 DIAGNOSIS — N40.0 BPH WITHOUT OBSTRUCTION/LOWER URINARY TRACT SYMPTOMS: ICD-10-CM

## 2022-03-23 PROCEDURE — 3008F BODY MASS INDEX DOCD: CPT | Mod: CPTII,S$GLB,, | Performed by: UROLOGY

## 2022-03-23 PROCEDURE — 99999 PR PBB SHADOW E&M-EST. PATIENT-LVL III: CPT | Mod: PBBFAC,,, | Performed by: UROLOGY

## 2022-03-23 PROCEDURE — 1159F PR MEDICATION LIST DOCUMENTED IN MEDICAL RECORD: ICD-10-PCS | Mod: CPTII,S$GLB,, | Performed by: UROLOGY

## 2022-03-23 PROCEDURE — 1159F MED LIST DOCD IN RCRD: CPT | Mod: CPTII,S$GLB,, | Performed by: UROLOGY

## 2022-03-23 PROCEDURE — 99999 PR PBB SHADOW E&M-EST. PATIENT-LVL III: ICD-10-PCS | Mod: PBBFAC,,, | Performed by: UROLOGY

## 2022-03-23 PROCEDURE — 4010F PR ACE/ARB THEARPY RXD/TAKEN: ICD-10-PCS | Mod: CPTII,S$GLB,, | Performed by: UROLOGY

## 2022-03-23 PROCEDURE — 3008F PR BODY MASS INDEX (BMI) DOCUMENTED: ICD-10-PCS | Mod: CPTII,S$GLB,, | Performed by: UROLOGY

## 2022-03-23 PROCEDURE — 99213 OFFICE O/P EST LOW 20 MIN: CPT | Mod: S$GLB,,, | Performed by: UROLOGY

## 2022-03-23 PROCEDURE — 1160F PR REVIEW ALL MEDS BY PRESCRIBER/CLIN PHARMACIST DOCUMENTED: ICD-10-PCS | Mod: CPTII,S$GLB,, | Performed by: UROLOGY

## 2022-03-23 PROCEDURE — 1160F RVW MEDS BY RX/DR IN RCRD: CPT | Mod: CPTII,S$GLB,, | Performed by: UROLOGY

## 2022-03-23 PROCEDURE — 99213 PR OFFICE/OUTPT VISIT, EST, LEVL III, 20-29 MIN: ICD-10-PCS | Mod: S$GLB,,, | Performed by: UROLOGY

## 2022-03-23 PROCEDURE — 4010F ACE/ARB THERAPY RXD/TAKEN: CPT | Mod: CPTII,S$GLB,, | Performed by: UROLOGY

## 2022-03-23 NOTE — PROGRESS NOTES
Subjective:       Patient ID: Blas Alcocer is a 61 y.o. male who was last seen in this office 2/15/2022    Chief Complaint:   Chief Complaint   Patient presents with    Results       Follow Up Prostate Biopsy    He underwent a prostate needle biopsy on 2/15/2022.  His biopsy was indicated due to: Elevated PSA.  Afterwards he experienced: Gross Hematuria and Blood in stool.  These symptoms have resolved.  His PSA prior to biopsy was 4.9.  His prostate size was 85 grams.  The ultrasound did not show a median lobe.  He currently does not have erectile dysfunction.  His pathology showed: benign disease.       ACTIVE MEDICAL ISSUES:  Patient Active Problem List   Diagnosis    Hypertriglyceridemia    Prediabetes    Hypovitaminosis D    Hypertension    Sixth nerve palsy of right eye    Brain mass    Wears glasses    Esotropia    Heel pain, bilateral       ALLERGIES AND MEDICATIONS: updated and reviewed.  Review of patient's allergies indicates:  No Known Allergies  Current Outpatient Medications   Medication Sig    amLODIPine (NORVASC) 5 MG tablet TAKE TWO TABLETS BY MOUTH EVERY DAY FOR BLOOD PRESSURE    fish oil-omega-3 fatty acids 300-1,000 mg capsule Take 2 g by mouth once daily.    lisinopriL-hydrochlorothiazide (PRINZIDE,ZESTORETIC) 20-12.5 mg per tablet Take 1 tablet by mouth once daily.    saw palmetto 80 MG capsule Take 80 mg by mouth once daily.     Current Facility-Administered Medications   Medication    gentamicin injection 80 mg       Review of Systems   Constitutional: Negative for activity change, fatigue, fever and unexpected weight change.   HENT: Negative for congestion.    Eyes: Negative for redness.   Respiratory: Negative for chest tightness and shortness of breath.    Cardiovascular: Negative for chest pain and leg swelling.   Gastrointestinal: Negative for abdominal pain, constipation, diarrhea, nausea and vomiting.   Genitourinary: Negative for dysuria, flank pain, frequency,  "hematuria, penile pain, penile swelling, scrotal swelling, testicular pain and urgency.   Musculoskeletal: Negative for arthralgias and back pain.   Neurological: Negative for dizziness and light-headedness.   Psychiatric/Behavioral: Negative for behavioral problems and confusion. The patient is not nervous/anxious.    All other systems reviewed and are negative.      Objective:      Vitals:    03/23/22 1636   Weight: 90 kg (198 lb 6.6 oz)   Height: 5' 9" (1.753 m)     Physical Exam  Vitals and nursing note reviewed.   Constitutional:       Appearance: He is well-developed.   HENT:      Head: Normocephalic.   Eyes:      Conjunctiva/sclera: Conjunctivae normal.   Neck:      Thyroid: No thyromegaly.      Trachea: No tracheal deviation.   Cardiovascular:      Rate and Rhythm: Normal rate.      Heart sounds: Normal heart sounds.   Pulmonary:      Effort: Pulmonary effort is normal. No respiratory distress.      Breath sounds: Normal breath sounds. No wheezing.   Abdominal:      General: Bowel sounds are normal.      Palpations: Abdomen is soft.      Tenderness: There is no abdominal tenderness. There is no rebound.      Hernia: No hernia is present.   Musculoskeletal:         General: No tenderness. Normal range of motion.      Cervical back: Normal range of motion and neck supple.   Lymphadenopathy:      Cervical: No cervical adenopathy.   Skin:     General: Skin is warm and dry.      Findings: No erythema or rash.   Neurological:      Mental Status: He is alert and oriented to person, place, and time.   Psychiatric:         Behavior: Behavior normal.         Thought Content: Thought content normal.         Judgment: Judgment normal.         Urine dipstick shows .  Micro exam: .    Collected: 02/15/22 1526   Result status: Final   Resulting lab: [facility]   Value: Part 1   Prostate needle biopsy (1, submitted as left base lateral):   -No evidence of malignancy   Part 2   Prostate needle " biopsy (1, submitted as left base medial):   -No evidence of malignancy   Part 3   Prostate needle biopsy (1, submitted as left mid lateral):   -No evidence of malignancy   Part 4   Prostate needle biopsy (1, submitted as left mid medial):   -No evidence of malignancy   Part 5   Prostate needle biopsy (1, submitted as left apex lateral):   -No evidence of malignancy   Part 6   Prostate needle biopsy (1, submitted as left apex medial):   -No evidence of malignancy   Part 7   Prostate needle biopsy (1, submitted as right base lateral):   -No evidence of malignancy   Part 8   Prostate needle biopsy (1, submitted as right base medial):   -No evidence of malignancy   Part 9   Prostate needle biopsy (1, submitted as right mid lateral):   -No evidence of malignancy   -Focal atrophy and mild chronic inflammation   Part 10   Prostate needle biopsy (1, submitted as right mid medial):   -No evidence of malignancy   Part 11   Prostate needle biopsy (1, submitted as right apex lateral):   -No evidence of malignancy   -Focal atrophy   Part 12   Prostate needle biopsy (1, submitted as right apex medial):   -No evidence of malignancy    Comment: Interp By Carlos Fowler M.D., Signed on 02/18/2022 at 12:52         Assessment:       1. Elevated PSA    2. BPH without obstruction/lower urinary tract symptoms          Plan:       1. Elevated PSA  Doing well  - Prostate Specific Antigen, Diagnostic; Future    2. BPH without obstruction/lower urinary tract symptoms  Doing well            No follow-ups on file.

## 2022-03-24 ENCOUNTER — PATIENT MESSAGE (OUTPATIENT)
Dept: FAMILY MEDICINE | Facility: CLINIC | Age: 62
End: 2022-03-24
Payer: COMMERCIAL

## 2022-03-24 ENCOUNTER — OFFICE VISIT (OUTPATIENT)
Dept: OPTOMETRY | Facility: CLINIC | Age: 62
End: 2022-03-24
Payer: COMMERCIAL

## 2022-03-24 DIAGNOSIS — H53.40 VISUAL FIELD DEFECT OF RIGHT EYE: ICD-10-CM

## 2022-03-24 DIAGNOSIS — H04.123 DRY EYE SYNDROME, BILATERAL: ICD-10-CM

## 2022-03-24 DIAGNOSIS — H52.4 PRESBYOPIA: ICD-10-CM

## 2022-03-24 DIAGNOSIS — H49.21 SIXTH NERVE PALSY OF RIGHT EYE: Primary | ICD-10-CM

## 2022-03-24 DIAGNOSIS — H50.00 ESOTROPIA: ICD-10-CM

## 2022-03-24 DIAGNOSIS — G93.89 BRAIN MASS: ICD-10-CM

## 2022-03-24 PROCEDURE — 92004 PR EYE EXAM, NEW PATIENT,COMPREHESV: ICD-10-PCS | Mod: S$GLB,,, | Performed by: OPTOMETRIST

## 2022-03-24 PROCEDURE — 4010F ACE/ARB THERAPY RXD/TAKEN: CPT | Mod: CPTII,S$GLB,, | Performed by: OPTOMETRIST

## 2022-03-24 PROCEDURE — 99999 PR PBB SHADOW E&M-EST. PATIENT-LVL II: ICD-10-PCS | Mod: PBBFAC,,, | Performed by: OPTOMETRIST

## 2022-03-24 PROCEDURE — 92004 COMPRE OPH EXAM NEW PT 1/>: CPT | Mod: S$GLB,,, | Performed by: OPTOMETRIST

## 2022-03-24 PROCEDURE — 1159F MED LIST DOCD IN RCRD: CPT | Mod: CPTII,S$GLB,, | Performed by: OPTOMETRIST

## 2022-03-24 PROCEDURE — 92015 PR REFRACTION: ICD-10-PCS | Mod: S$GLB,,, | Performed by: OPTOMETRIST

## 2022-03-24 PROCEDURE — 99999 PR PBB SHADOW E&M-EST. PATIENT-LVL II: CPT | Mod: PBBFAC,,, | Performed by: OPTOMETRIST

## 2022-03-24 PROCEDURE — 4010F PR ACE/ARB THEARPY RXD/TAKEN: ICD-10-PCS | Mod: CPTII,S$GLB,, | Performed by: OPTOMETRIST

## 2022-03-24 PROCEDURE — 1159F PR MEDICATION LIST DOCUMENTED IN MEDICAL RECORD: ICD-10-PCS | Mod: CPTII,S$GLB,, | Performed by: OPTOMETRIST

## 2022-03-24 PROCEDURE — 92015 DETERMINE REFRACTIVE STATE: CPT | Mod: S$GLB,,, | Performed by: OPTOMETRIST

## 2022-03-24 NOTE — PROGRESS NOTES
HPI     60 Y/o male is here for routine eye exam with C/o pt is having trouble   reading small print pt also states dry eye in both eye's occasionally    Pt denies pain and discomfort   No f/f    Eye med: no gtt     Last edited by Ligia Adams MA on 3/24/2022  1:57 PM. (History)            Assessment /Plan     For exam results, see Encounter Report.        Dry eye syndrome, bilateral   Use art tears BID/PRN    Sixth nerve palsy of right eye  Esotropia  Presbyopia   Continue with prism in specs   Updated Rx today    Brain mass  Visual field defect of right eye  2017 S/P Gamma Knife Radiation procedure 02/2017 by Dr. Robert MD       RTC 1 year

## 2022-04-12 ENCOUNTER — LAB VISIT (OUTPATIENT)
Dept: LAB | Facility: HOSPITAL | Age: 62
End: 2022-04-12
Attending: HOSPITALIST
Payer: COMMERCIAL

## 2022-04-12 DIAGNOSIS — E05.90 SUBCLINICAL HYPERTHYROIDISM: ICD-10-CM

## 2022-04-12 DIAGNOSIS — Z92.3 STATUS POST GAMMA KNIFE TREATMENT: ICD-10-CM

## 2022-04-12 LAB
ALBUMIN SERPL BCP-MCNC: 4 G/DL (ref 3.5–5.2)
ALP SERPL-CCNC: 47 U/L (ref 55–135)
ALT SERPL W/O P-5'-P-CCNC: 29 U/L (ref 10–44)
ANION GAP SERPL CALC-SCNC: 6 MMOL/L (ref 8–16)
AST SERPL-CCNC: 24 U/L (ref 10–40)
BILIRUB SERPL-MCNC: 1 MG/DL (ref 0.1–1)
BUN SERPL-MCNC: 13 MG/DL (ref 8–23)
CALCIUM SERPL-MCNC: 9.4 MG/DL (ref 8.7–10.5)
CHLORIDE SERPL-SCNC: 103 MMOL/L (ref 95–110)
CO2 SERPL-SCNC: 26 MMOL/L (ref 23–29)
CORTIS SERPL-MCNC: 9.6 UG/DL (ref 4.3–22.4)
CREAT SERPL-MCNC: 0.9 MG/DL (ref 0.5–1.4)
EST. GFR  (AFRICAN AMERICAN): >60 ML/MIN/1.73 M^2
EST. GFR  (NON AFRICAN AMERICAN): >60 ML/MIN/1.73 M^2
FSH SERPL-ACNC: 7.79 MIU/ML (ref 0.95–11.95)
GLUCOSE SERPL-MCNC: 131 MG/DL (ref 70–110)
LH SERPL-ACNC: 1.9 MIU/ML (ref 0.6–12.1)
POTASSIUM SERPL-SCNC: 4.2 MMOL/L (ref 3.5–5.1)
PROT SERPL-MCNC: 6.8 G/DL (ref 6–8.4)
SODIUM SERPL-SCNC: 135 MMOL/L (ref 136–145)
T3 SERPL-MCNC: 106 NG/DL (ref 60–180)
T4 FREE SERPL-MCNC: 0.96 NG/DL (ref 0.71–1.51)
THYROPEROXIDASE IGG SERPL-ACNC: <6 IU/ML
TSH SERPL DL<=0.005 MIU/L-ACNC: 0.29 UIU/ML (ref 0.4–4)

## 2022-04-12 PROCEDURE — 83002 ASSAY OF GONADOTROPIN (LH): CPT | Performed by: HOSPITALIST

## 2022-04-12 PROCEDURE — 80053 COMPREHEN METABOLIC PANEL: CPT | Performed by: HOSPITALIST

## 2022-04-12 PROCEDURE — 83001 ASSAY OF GONADOTROPIN (FSH): CPT | Performed by: HOSPITALIST

## 2022-04-12 PROCEDURE — 86376 MICROSOMAL ANTIBODY EACH: CPT | Performed by: HOSPITALIST

## 2022-04-12 PROCEDURE — 36415 COLL VENOUS BLD VENIPUNCTURE: CPT | Performed by: HOSPITALIST

## 2022-04-12 PROCEDURE — 84480 ASSAY TRIIODOTHYRONINE (T3): CPT | Performed by: HOSPITALIST

## 2022-04-12 PROCEDURE — 84445 ASSAY OF TSI GLOBULIN: CPT | Performed by: HOSPITALIST

## 2022-04-12 PROCEDURE — 82040 ASSAY OF SERUM ALBUMIN: CPT | Performed by: HOSPITALIST

## 2022-04-12 PROCEDURE — 82533 TOTAL CORTISOL: CPT | Performed by: HOSPITALIST

## 2022-04-12 PROCEDURE — 84439 ASSAY OF FREE THYROXINE: CPT | Performed by: HOSPITALIST

## 2022-04-12 PROCEDURE — 84443 ASSAY THYROID STIM HORMONE: CPT | Performed by: HOSPITALIST

## 2022-04-12 PROCEDURE — 82024 ASSAY OF ACTH: CPT | Mod: 59 | Performed by: HOSPITALIST

## 2022-04-14 LAB
ACTH PLAS-MCNC: 13 PG/ML (ref 0–46)
TSI SER-ACNC: <0.1 IU/L

## 2022-04-17 LAB
ALBUMIN SERPL-MCNC: 4.3 G/DL (ref 3.6–5.1)
SHBG SERPL-SCNC: 43 NMOL/L (ref 22–77)
TESTOST FREE SERPL-MCNC: 58.1 PG/ML (ref 46–224)
TESTOST SERPL-MCNC: 530 NG/DL (ref 250–1100)
TESTOSTERONE.FREE+WB SERPL-MCNC: 114.4 NG/DL (ref 110–575)

## 2022-04-18 ENCOUNTER — OFFICE VISIT (OUTPATIENT)
Dept: ENDOCRINOLOGY | Facility: CLINIC | Age: 62
End: 2022-04-18
Payer: COMMERCIAL

## 2022-04-18 ENCOUNTER — PATIENT MESSAGE (OUTPATIENT)
Dept: OTHER | Facility: OTHER | Age: 62
End: 2022-04-18
Payer: COMMERCIAL

## 2022-04-18 VITALS
SYSTOLIC BLOOD PRESSURE: 120 MMHG | HEART RATE: 77 BPM | BODY MASS INDEX: 29.55 KG/M2 | TEMPERATURE: 99 F | WEIGHT: 200.13 LBS | DIASTOLIC BLOOD PRESSURE: 80 MMHG

## 2022-04-18 DIAGNOSIS — R73.03 PREDIABETES: Primary | ICD-10-CM

## 2022-04-18 DIAGNOSIS — E11.65 TYPE 2 DIABETES MELLITUS WITH HYPERGLYCEMIA, WITHOUT LONG-TERM CURRENT USE OF INSULIN: ICD-10-CM

## 2022-04-18 DIAGNOSIS — E55.9 HYPOVITAMINOSIS D: ICD-10-CM

## 2022-04-18 DIAGNOSIS — E66.09 CLASS 1 OBESITY DUE TO EXCESS CALORIES WITH SERIOUS COMORBIDITY AND BODY MASS INDEX (BMI) OF 30.0 TO 30.9 IN ADULT: ICD-10-CM

## 2022-04-18 DIAGNOSIS — E88.819 INSULIN RESISTANCE: ICD-10-CM

## 2022-04-18 DIAGNOSIS — R79.89 ABNORMAL THYROID BLOOD TEST: ICD-10-CM

## 2022-04-18 DIAGNOSIS — G93.89 BRAIN MASS: ICD-10-CM

## 2022-04-18 PROBLEM — E66.811 CLASS 1 OBESITY DUE TO EXCESS CALORIES WITH SERIOUS COMORBIDITY AND BODY MASS INDEX (BMI) OF 30.0 TO 30.9 IN ADULT: Status: ACTIVE | Noted: 2022-04-18

## 2022-04-18 PROCEDURE — 99999 PR PBB SHADOW E&M-EST. PATIENT-LVL III: CPT | Mod: PBBFAC,,, | Performed by: HOSPITALIST

## 2022-04-18 PROCEDURE — 3079F PR MOST RECENT DIASTOLIC BLOOD PRESSURE 80-89 MM HG: ICD-10-PCS | Mod: CPTII,S$GLB,, | Performed by: HOSPITALIST

## 2022-04-18 PROCEDURE — 3074F PR MOST RECENT SYSTOLIC BLOOD PRESSURE < 130 MM HG: ICD-10-PCS | Mod: CPTII,S$GLB,, | Performed by: HOSPITALIST

## 2022-04-18 PROCEDURE — 4010F PR ACE/ARB THEARPY RXD/TAKEN: ICD-10-PCS | Mod: CPTII,S$GLB,, | Performed by: HOSPITALIST

## 2022-04-18 PROCEDURE — 3008F BODY MASS INDEX DOCD: CPT | Mod: CPTII,S$GLB,, | Performed by: HOSPITALIST

## 2022-04-18 PROCEDURE — 1159F MED LIST DOCD IN RCRD: CPT | Mod: CPTII,S$GLB,, | Performed by: HOSPITALIST

## 2022-04-18 PROCEDURE — 3079F DIAST BP 80-89 MM HG: CPT | Mod: CPTII,S$GLB,, | Performed by: HOSPITALIST

## 2022-04-18 PROCEDURE — 3074F SYST BP LT 130 MM HG: CPT | Mod: CPTII,S$GLB,, | Performed by: HOSPITALIST

## 2022-04-18 PROCEDURE — 4010F ACE/ARB THERAPY RXD/TAKEN: CPT | Mod: CPTII,S$GLB,, | Performed by: HOSPITALIST

## 2022-04-18 PROCEDURE — 1159F PR MEDICATION LIST DOCUMENTED IN MEDICAL RECORD: ICD-10-PCS | Mod: CPTII,S$GLB,, | Performed by: HOSPITALIST

## 2022-04-18 PROCEDURE — 3008F PR BODY MASS INDEX (BMI) DOCUMENTED: ICD-10-PCS | Mod: CPTII,S$GLB,, | Performed by: HOSPITALIST

## 2022-04-18 PROCEDURE — 99999 PR PBB SHADOW E&M-EST. PATIENT-LVL III: ICD-10-PCS | Mod: PBBFAC,,, | Performed by: HOSPITALIST

## 2022-04-18 PROCEDURE — 99214 PR OFFICE/OUTPT VISIT, EST, LEVL IV, 30-39 MIN: ICD-10-PCS | Mod: S$GLB,,, | Performed by: HOSPITALIST

## 2022-04-18 PROCEDURE — 99214 OFFICE O/P EST MOD 30 MIN: CPT | Mod: S$GLB,,, | Performed by: HOSPITALIST

## 2022-04-18 RX ORDER — POLYETHYLENE GLYCOL-3350 AND ELECTROLYTES 236; 6.74; 5.86; 2.97; 22.74 G/274.31G; G/274.31G; G/274.31G; G/274.31G; G/274.31G
4000 POWDER, FOR SOLUTION ORAL ONCE
COMMUNITY
Start: 2022-02-11

## 2022-04-18 RX ORDER — DULAGLUTIDE 0.75 MG/.5ML
0.75 INJECTION, SOLUTION SUBCUTANEOUS
Qty: 4 PEN | Refills: 11 | Status: SHIPPED | OUTPATIENT
Start: 2022-04-18 | End: 2022-05-18

## 2022-04-18 NOTE — ASSESSMENT & PLAN NOTE
- patient with longstanding history of low TSH with normal free T4, idiopathic given negative TPO/TSI antibodies  - do not suspect hyperthyroidism given lack of symptoms.  With normal free T4. ? secondary dysfunction in setting of gamma knife  - rule out pituitary deficiency:  Normal hormonal workup as above  - can consider uptake scan in the future, at this time no need for further workup, no need for medication  - repeat lab work in 3 months to re-evaluate

## 2022-04-18 NOTE — PROGRESS NOTES
Subjective:      Patient ID: Blas Alcocer is a 61 y.o. male presented to [facility] on 4/18/2022.  Chief Complaint:  Hyperthyroidism      History of Present Illness: Blas Alcocer is a 61 y.o. male here for abnormal thyroid blood work  Other significant past medical history:  History of meningioma causing, Sixth nerve palsy of right eye status post Gamma Knife treatment 2018    Interval history:  Patient is here for follow-up of abnormal thyroid lab work.  Also noted with recent fasting hyperglycemia.  History of diabetes/prediabetes.  Has been struggling with glucose control/weight management at home.      In regards to abnormal thyroid lab  Low TSH, normal free T4, subclinical hyperthyroidism>> longstanding history prior to gamma knife  TSH has been decreasing over last few months.    Hormonal workup done showed normal antibodies:  Negative TSI, TPO  No other pituitary issues:  Recent blood work low suspicion for hypopituitarism    First diagnosed: 2015?, results shows decrease in TSH from 4977-8610.  Possibly worsen  post Gamma Knife treatment 2018  Patient denies any symptoms of hyperthyroidism including palpitation, tremors, weight loss  Patient reports fatigue off and on otherwise does have issue with some weight gain  No hypotension no polyuria, polydipsia    Patient denies headache, blurred vision    No family history of thyroid disorder that he can recall  Besides from Gamma Knife no other radiation exposure    Denies any use of:  Amiodarone/lithium  No new medication.  Compliant with blood pressure medication amlodipine, lisinopril    In the past had testosterone evaluated by PCP:  Normal, of note prior to gamma knife  Did have 2 COVID infection asymptomatic per patient last 12/21    Herbal/Vitamin/Supplement:  Denies take Kelp, Iodine, Biotin, Selenium, Sea sarabia, Bladder wrack  Does take saw palmetto for prostate  Was going to take weight loss supplements.  On hold at this    Of note  does have prediabetes A1c 5.7    Lab Results   Component Value Date    TSH 0.295 (L) 04/12/2022    TSH 0.236 (L) 12/17/2021    TSH 0.341 (L) 12/02/2019    TSH 0.348 (L) 02/15/2019    FREET4 0.96 04/12/2022    FREET4 0.91 12/17/2021    FREET4 0.89 12/02/2019    FREET4 1.00 02/15/2019    THYROIDSTIMI <0.10 04/12/2022    THYROPEROXID <6.0 04/12/2022             2) With regards to type 2 diabetes/prediabetes  Diagnosed w/ DM:  prediabetes since 2019 with recent hyperglycemia on fasting blood work    Does not have have glucometer, he will discuss this with [facility]  Patient is also interested in weight loss    Current meds:  None currently  Home glucose checks:  Does not have glucometer  Weight trend: increasing steadily  Diabetes Education: No  Diabetes Related Hospitalization:  No  Hx of pancreatitis, hx of thyroid cancer: No  Family history of diabetes: Yes  Occupation: working    Eye exam current (within one year): yes, DR: no  Reports cuts or ulcers on feet:   Denies    Statin: Not taking  ACE/ARB: Taking    Diabetes Management Status: Reviewed     A1C Trend  Lab Results   Component Value Date    HGBA1C 5.7 (H) 12/17/2021    HGBA1C 5.5 06/17/2020    HGBA1C 5.7 (H) 12/02/2019       No results found for: MICALBCREAT  No results found for: RWERAHIF05  No results found for: TTGIGA    No results found for: CPEPTIDE, GLUTAMICACID, ISLETCELLANT, FRUCTOSAMINE     Screening or Prevention Patient's value Goal Complete/Controlled?   Lipid profile : 12/17/2021 Annually Yes   LDL control Lab Results   Component Value Date    LDLCALC 141.0 12/17/2021    Annually/Less than 100 mg/dl  No   Nephropathy screening No results found for: LABMICR  Lab Results   Component Value Date    PROTEINUA Negative 12/17/2021    Annually Yes   Blood pressure BP Readings from Last 1 Encounters:   04/18/22 120/80    Less than 140/90 Yes   Dilated retinal exam : 03/24/2022 Annually Yes   Foot exam   Most Recent Foot Exam Date: Not Found  Annually Yes       Reviewed past surgical, medical, family, social history and updated as appropriate.    Review of Systems   : see HPI above    Objective:   /80 (BP Location: Left arm)   Pulse 77   Temp 98.6 °F (37 °C) (Oral)   Wt 90.8 kg (200 lb 1.6 oz)   BMI 29.55 kg/m²     Body mass index is 29.55 kg/m².  Vital signs reviewed    Physical Exam  Vitals and nursing note reviewed.   Constitutional:       General: He is not in acute distress.     Appearance: Normal appearance. He is well-developed. He is obese. He is not toxic-appearing.   Neck:      Thyroid: No thyromegaly.      Comments: No goiter noted  Cardiovascular:      Heart sounds: Normal heart sounds.   Pulmonary:      Effort: Pulmonary effort is normal. No respiratory distress.   Abdominal:      Tenderness: There is no abdominal tenderness.   Musculoskeletal:         General: No deformity. Normal range of motion.      Cervical back: Normal range of motion.   Skin:     Findings: No bruising.   Neurological:      Mental Status: He is alert and oriented to person, place, and time.   Psychiatric:         Mood and Affect: Mood normal.         Lab Reviewed:   Lab Results   Component Value Date    HGBA1C 5.7 (H) 12/17/2021       Lab Results   Component Value Date    CHOL 206 (H) 12/17/2021    HDL 47 12/17/2021    LDLCALC 141.0 12/17/2021    TRIG 90 12/17/2021    CHOLHDL 22.8 12/17/2021       Lab Results   Component Value Date     (L) 04/12/2022    K 4.2 04/12/2022     04/12/2022    CO2 26 04/12/2022     (H) 04/12/2022    BUN 13 04/12/2022    CREATININE 0.9 04/12/2022    CALCIUM 9.4 04/12/2022    PROT 6.8 04/12/2022    ALBUMIN 4.0 04/12/2022    ALBUMIN 4.3 04/12/2022    BILITOT 1.0 04/12/2022    ALKPHOS 47 (L) 04/12/2022    AST 24 04/12/2022    ALT 29 04/12/2022    ANIONGAP 6 (L) 04/12/2022    ESTGFRAFRICA >60 04/12/2022    EGFRNONAA >60 04/12/2022    TSH 0.295 (L) 04/12/2022        Lab Results   Component Value Date    SIDGUQKM54SI 23  (L) 01/05/2016    UOIPSJCE42KP 35 10/25/2013    KXTVKNNE01FK 25 (L) 06/18/2013    CALCIUM 9.4 04/12/2022    CALCIUM 9.4 12/17/2021    CALCIUM 9.3 06/17/2020    ALKPHOS 47 (L) 04/12/2022    ALKPHOS 55 12/17/2021    ALKPHOS 52 (L) 06/17/2020    TSH 0.295 (L) 04/12/2022       Assessment     1. Prediabetes     2. Type 2 diabetes mellitus with hyperglycemia, without long-term current use of insulin  dulaglutide (TRULICITY) 0.75 mg/0.5 mL pen injector    Hemoglobin A1C    Basic Metabolic Panel   3. Brain mass     4. Insulin resistance  Hemoglobin A1C    Basic Metabolic Panel   5. Abnormal thyroid blood test  TSH    T4, Free   6. Hypovitaminosis D     7. Class 1 obesity due to excess calories with serious comorbidity and body mass index (BMI) of 30.0 to 30.9 in adult          Plan     Abnormal thyroid blood test  - patient with longstanding history of low TSH with normal free T4, idiopathic given negative TPO/TSI antibodies  - do not suspect hyperthyroidism given lack of symptoms.  With normal free T4. ? secondary dysfunction in setting of gamma knife  - rule out pituitary deficiency:  Normal hormonal workup as above  - can consider uptake scan in the future, at this time no need for further workup, no need for medication  - repeat lab work in 3 months to re-evaluate      Diabetes type 2, with hyperglycemia  - history of prediabetes for many years, now with worsening hyperglycemia weight gain,  - discussed with patient he is monitoring his diet  - patient expressed desire for medication that would help with weight loss as well as to treat his impaired fasting glucose/insulin resistance  - rule start patient on trial of Trulicity 0.75 mg Q weekly injection to help treat both  - advised patient to contact [facility] to describe to glucose monitoring  - to repeat lab work in 3 months to re-evaluate, check A1c      Obesity  - Body mass index is 29.55 kg/m².  - dietary discussion as above  - continue to monitor  weight  - dietary modification discussed      Vitamin-D deficiency  - Check lab work    Advised patient to follow up with PCP for routine health maintenance care.   RTC in 3 months      Mckinley Romero M.D.  Endocrinology  [facility]  4/18/2022      Disclaimer: This note has been generated in part with the use of voice-recognition software. There may be typographical errors that have been missed during proof-reading.

## 2022-04-19 ENCOUNTER — PATIENT MESSAGE (OUTPATIENT)
Dept: ENDOCRINOLOGY | Facility: CLINIC | Age: 62
End: 2022-04-19
Payer: COMMERCIAL

## 2022-05-10 ENCOUNTER — PATIENT MESSAGE (OUTPATIENT)
Dept: ENDOCRINOLOGY | Facility: CLINIC | Age: 62
End: 2022-05-10
Payer: COMMERCIAL

## 2022-05-12 DIAGNOSIS — I10 ESSENTIAL HYPERTENSION: ICD-10-CM

## 2022-05-12 RX ORDER — AMLODIPINE BESYLATE 5 MG/1
TABLET ORAL
Qty: 90 TABLET | Refills: 3 | Status: SHIPPED | OUTPATIENT
Start: 2022-05-12 | End: 2022-06-28

## 2022-05-12 NOTE — TELEPHONE ENCOUNTER
Refill Routing Note   Medication(s) are not appropriate for processing by [facility] for the following reason(s):      - Patient has been seen in the ED/Hospital since the last PCP visit    [facility] action(s):  Defer          Medication reconciliation completed: No     Appointments  past 12m or future 3m with PCP    Date Provider   Last Visit   12/13/2021 Reddy Kong MD   Next Visit   Visit date not found Reddy Kong MD   ED visits in past 90 days: 0        Note composed:9:13 AM 05/12/2022

## 2022-05-12 NOTE — TELEPHONE ENCOUNTER
No new care gaps identified.  [facility] Embedded Care Gaps. Reference number: [id number]. 5/12/2022   8:37:59 AM CDT

## 2022-07-11 ENCOUNTER — LAB VISIT (OUTPATIENT)
Dept: LAB | Facility: HOSPITAL | Age: 62
End: 2022-07-11
Attending: HOSPITALIST
Payer: COMMERCIAL

## 2022-07-11 DIAGNOSIS — R79.89 ABNORMAL THYROID BLOOD TEST: ICD-10-CM

## 2022-07-11 DIAGNOSIS — E11.65 TYPE 2 DIABETES MELLITUS WITH HYPERGLYCEMIA, WITHOUT LONG-TERM CURRENT USE OF INSULIN: ICD-10-CM

## 2022-07-11 DIAGNOSIS — E88.819 INSULIN RESISTANCE: ICD-10-CM

## 2022-07-11 LAB
ANION GAP SERPL CALC-SCNC: 10 MMOL/L (ref 8–16)
BUN SERPL-MCNC: 14 MG/DL (ref 8–23)
CALCIUM SERPL-MCNC: 9.2 MG/DL (ref 8.7–10.5)
CHLORIDE SERPL-SCNC: 107 MMOL/L (ref 95–110)
CO2 SERPL-SCNC: 24 MMOL/L (ref 23–29)
CREAT SERPL-MCNC: 1 MG/DL (ref 0.5–1.4)
EST. GFR  (AFRICAN AMERICAN): >60 ML/MIN/1.73 M^2
EST. GFR  (NON AFRICAN AMERICAN): >60 ML/MIN/1.73 M^2
ESTIMATED AVG GLUCOSE: 111 MG/DL (ref 68–131)
GLUCOSE SERPL-MCNC: 119 MG/DL (ref 70–110)
HBA1C MFR BLD: 5.5 % (ref 4–5.6)
POTASSIUM SERPL-SCNC: 4 MMOL/L (ref 3.5–5.1)
SODIUM SERPL-SCNC: 141 MMOL/L (ref 136–145)
T4 FREE SERPL-MCNC: 0.96 NG/DL (ref 0.71–1.51)
TSH SERPL DL<=0.005 MIU/L-ACNC: 0.32 UIU/ML (ref 0.4–4)

## 2022-07-11 PROCEDURE — 84439 ASSAY OF FREE THYROXINE: CPT | Performed by: HOSPITALIST

## 2022-07-11 PROCEDURE — 84443 ASSAY THYROID STIM HORMONE: CPT | Performed by: HOSPITALIST

## 2022-07-11 PROCEDURE — 36415 COLL VENOUS BLD VENIPUNCTURE: CPT | Mod: PO | Performed by: HOSPITALIST

## 2022-07-11 PROCEDURE — 80048 BASIC METABOLIC PNL TOTAL CA: CPT | Performed by: HOSPITALIST

## 2022-07-11 PROCEDURE — 83036 HEMOGLOBIN GLYCOSYLATED A1C: CPT | Performed by: HOSPITALIST

## 2022-07-15 ENCOUNTER — HOSPITAL ENCOUNTER (EMERGENCY)
Facility: HOSPITAL | Age: 62
Discharge: HOME | End: 2022-07-15
Attending: EMERGENCY MEDICINE
Payer: COMMERCIAL

## 2022-07-15 VITALS
TEMPERATURE: 98 F | DIASTOLIC BLOOD PRESSURE: 76 MMHG | HEART RATE: 60 BPM | SYSTOLIC BLOOD PRESSURE: 115 MMHG | HEIGHT: 69 IN | BODY MASS INDEX: 26.66 KG/M2 | RESPIRATION RATE: 18 BRPM | WEIGHT: 180 LBS | OXYGEN SATURATION: 100 %

## 2022-07-15 DIAGNOSIS — M79.671 RIGHT FOOT PAIN: Primary | ICD-10-CM

## 2022-07-15 PROCEDURE — 99285 EMERGENCY DEPT VISIT HI MDM: CPT | Mod: 25

## 2022-07-15 RX ORDER — HYDROCODONE BITARTRATE AND ACETAMINOPHEN 5; 325 MG/1; MG/1
1 TABLET ORAL EVERY 6 HOURS PRN
Qty: 12 TABLET | Refills: 0 | Status: SHIPPED | OUTPATIENT
Start: 2022-07-15 | End: 2022-07-22 | Stop reason: ALTCHOICE

## 2022-07-15 NOTE — ED TRIAGE NOTES
Pt to ED reporting right foot pain x 2 weeks after a trip and fall. Pt states pain has become unbearable. Some swelling noted.

## 2022-07-15 NOTE — ED PROVIDER NOTES
Encounter Date: 7/15/2022       History     Chief Complaint   Patient presents with    Foot Pain     Pt to ER with c/o right foot pain s/p trip and fall two weeks ago. Pt reports pain isn't getting better. Pt ambulatory to triage.      Chief complaint:  Foot pain    History of present illness:  Patient is a 62-year-old male who states 2 weeks ago he was stepping off of his deck when he rolled his ankle and began experience pain to the right foot he reports the pain is constant and achy made worse by walking.  He reports current severity pain of 10/10.  He reports a history of hypertension hyperlipidemia and borderline diabetes.    The history is provided by the patient. No  was used.     Review of patient's allergies indicates:  No Known Allergies  Past Medical History:   Diagnosis Date    Dyslipidemia (high LDL; low HDL) 6/13    History of elevated prostate specific antigen (PSA)     Hypertension     Hypovitaminosis D 6/13    Prediabetes 6/13    Sixth nerve palsy of right eye 12/6/2016     Past Surgical History:   Procedure Laterality Date    COLONOSCOPY N/A 3/18/2022    Procedure: COLONOSCOPY;  Surgeon: Farzad Sow MD;  Location: [facility];  Service: Endoscopy;  Laterality: N/A;    PROSTATE BIOPSY      negative     Family History   Problem Relation Age of Onset    Dementia Mother     Dementia Father     Diabetes Sister     Hypertension Sister     Diabetes Brother     No Known Problems Son     Dementia Sister     Dementia Sister     No Known Problems Sister     No Known Problems Brother     No Known Problems Brother     No Known Problems Brother     No Known Problems Maternal Aunt     No Known Problems Maternal Uncle     No Known Problems Paternal Aunt     No Known Problems Paternal Uncle     No Known Problems Maternal Grandmother     No Known Problems Maternal Grandfather     No Known Problems Paternal Grandmother     No Known Problems Paternal Grandfather      Blindness Neg Hx     Glaucoma Neg Hx     Retinal detachment Neg Hx     Macular degeneration Neg Hx     Amblyopia Neg Hx     Cancer Neg Hx     Cataracts Neg Hx     Strabismus Neg Hx     Stroke Neg Hx     Thyroid disease Neg Hx      Social History     Tobacco Use    Smoking status: Never Smoker    Smokeless tobacco: Never Used   Substance Use Topics    Alcohol use: Not Currently    Drug use: No     Review of Systems   Constitutional: Negative for appetite change, chills, diaphoresis, fatigue and fever.   HENT: Negative for congestion, ear discharge, ear pain, postnasal drip, rhinorrhea, sinus pressure, sneezing, sore throat and voice change.    Eyes: Negative for discharge, itching and visual disturbance.   Respiratory: Negative for cough, shortness of breath and wheezing.    Cardiovascular: Negative for chest pain, palpitations and leg swelling.   Gastrointestinal: Negative for abdominal pain, nausea and vomiting.   Endocrine: Negative for polydipsia, polyphagia and polyuria.   Genitourinary: Negative for difficulty urinating, dysuria, frequency, hematuria, penile discharge, penile pain, penile swelling and urgency.   Musculoskeletal: Positive for arthralgias. Negative for myalgias.   Skin: Negative for rash and wound.   Neurological: Negative for dizziness, seizures, syncope and weakness.   Hematological: Negative for adenopathy. Does not bruise/bleed easily.   Psychiatric/Behavioral: Negative for agitation and self-injury. The patient is not nervous/anxious.        Physical Exam     Initial Vitals [07/15/22 1615]   BP Pulse Resp Temp SpO2   111/80 79 20 98.1 °F (36.7 °C) 96 %      MAP       --         Physical Exam    Nursing note and vitals reviewed.  Constitutional: He appears well-developed and well-nourished. He is not diaphoretic. No distress.   HENT:   Head: Normocephalic and atraumatic.   Right Ear: External ear normal.   Left Ear: External ear normal.   Nose: Nose normal.   Eyes: Pupils are  equal, round, and reactive to light. Right eye exhibits no discharge. Left eye exhibits no discharge. No scleral icterus.   Neck:   Normal range of motion.  Pulmonary/Chest: No respiratory distress.   Abdominal: He exhibits no distension.   Musculoskeletal:         General: Normal range of motion.      Cervical back: Normal range of motion.      Right foot: Normal range of motion and normal capillary refill. Tenderness (Over 5th metatarsal region) present. No swelling, deformity, bunion, Charcot foot, foot drop, prominent metatarsal heads, laceration, bony tenderness or crepitus. Normal pulse.     Neurological: He is alert and oriented to person, place, and time.   Skin: Skin is dry. Capillary refill takes less than 2 seconds.         ED Course   Procedures  Labs Reviewed - No data to display       Imaging Results          CT Foot Without Contrast Right (In process)  Result time 07/15/22 20:12:21               X-Ray Foot Complete Right (Final result)  Result time 07/15/22 17:28:30    Final result by Leighton Garcia MD (07/15/22 17:28:30)                 Impression:      No acute osseous abnormality identified.      Electronically signed by: Leighton Garcia MD  Date:    07/15/2022  Time:    17:28             Narrative:    EXAMINATION:  XR ANKLE COMPLETE 3 VIEW RIGHT; XR FOOT COMPLETE 3 VIEW RIGHT    CLINICAL HISTORY:  Injury, unspecified, initial encounter    TECHNIQUE:  AP, lateral, and oblique images of the right ankle were performed.  Right foot three views.    COMPARISON:  None    FINDINGS:  No evidence of acute displaced fracture, dislocation, or osseous destructive process.  Ankle mortise is maintained.  Lisfranc articulation appears congruent.                               X-Ray Ankle Complete Right (Final result)  Result time 07/15/22 17:28:30    Final result by Leighton Garcia MD (07/15/22 17:28:30)                 Impression:      No acute osseous abnormality identified.      Electronically signed  by: Leighton Garcia MD  Date:    07/15/2022  Time:    17:28             Narrative:    EXAMINATION:  XR ANKLE COMPLETE 3 VIEW RIGHT; XR FOOT COMPLETE 3 VIEW RIGHT    CLINICAL HISTORY:  Injury, unspecified, initial encounter    TECHNIQUE:  AP, lateral, and oblique images of the right ankle were performed.  Right foot three views.    COMPARISON:  None    FINDINGS:  No evidence of acute displaced fracture, dislocation, or osseous destructive process.  Ankle mortise is maintained.  Lisfranc articulation appears congruent.                                 Medications - No data to display        APC / Resident Notes:   62-year-old male reports pain to the right 5th metatarsal area following an injury last week.  On examination there is no ecchymosis no sign of trauma distal pulse sensation and movement are intact.  There is mild tenderness over the 5th metatarsal area.  Differential diagnosis includes fracture dislocation sprain strain Lisfranc disarticulation.  Orders include x-ray of the area.  X-ray indicated no fracture or dislocation.  As the patient continued to have pain a CT scan was ordered it is not yet resulted in the patient is requested to be discharged home.  A wet read may indicate small fracture at the base of the metatarsal therefore I put the patient in a walking shoe and prescribed medication for his discomfort with drowsy warning as appropriate.  He should follow up with podiatry.    See AVS for additional recommendations. Medications listed herein were prescribed after reviewing the patient's allergies, medication list, history, most recent laboratories as available.  Referrals below were provided after reviewing the patient's previous medical providers. He understands he  should return for any worsening or changes in condition.  Prior to discharge the patient was asked if he  had any additional concerns or complaints and he declined. The patient was given an opportunity to ask questions and all were  answered to his satisfaction.         ED Course as of 07/15/22 2013   Fri Jul 15, 2022   1747 X-Ray Foot Complete Right       No acute osseous abnormality identified. [VC]   1747 X-Ray Ankle Complete Right    No acute osseous abnormality identified. [VC]   1747 BP: 111/80 [VC]   1747 Temp: 98.1 °F (36.7 °C) [VC]   1747 Temp src: Oral [VC]   1747 Pulse: 79 [VC]   1747 Resp: 20 [VC]   1747 SpO2: 96 % [VC]      ED Course User Index  [VC] Hal Mcdaniels DNP             Clinical Impression:   Final diagnoses:  [M79.671] Right foot pain (Primary)          ED Disposition Condition    Discharge Stable        ED Prescriptions     Medication Sig Dispense Start Date End Date Auth. Provider    HYDROcodone-acetaminophen (NORCO) 5-325 mg per tablet Take 1 tablet by mouth every 6 (six) hours as needed for Pain. 12 tablet 7/15/2022  Hal Mcdaniels DNP        Follow-up Information     Follow up With Specialties Details Why Contact Info    Shannon Rhoades DPM Podiatry, Wound Care Schedule an appointment as soon as possible for a visit   [address] [facility]  [place] [id number]  [phone]             Hal Mcdaniels DNP  07/15/22 2013

## 2022-07-18 ENCOUNTER — OFFICE VISIT (OUTPATIENT)
Dept: ENDOCRINOLOGY | Facility: CLINIC | Age: 62
End: 2022-07-18
Payer: COMMERCIAL

## 2022-07-18 VITALS
TEMPERATURE: 98 F | DIASTOLIC BLOOD PRESSURE: 66 MMHG | BODY MASS INDEX: 27.85 KG/M2 | HEART RATE: 70 BPM | SYSTOLIC BLOOD PRESSURE: 100 MMHG | WEIGHT: 188.63 LBS

## 2022-07-18 DIAGNOSIS — E11.65 TYPE 2 DIABETES MELLITUS WITH HYPERGLYCEMIA, WITHOUT LONG-TERM CURRENT USE OF INSULIN: Primary | ICD-10-CM

## 2022-07-18 DIAGNOSIS — I10 PRIMARY HYPERTENSION: ICD-10-CM

## 2022-07-18 DIAGNOSIS — E66.09 CLASS 1 OBESITY DUE TO EXCESS CALORIES WITH SERIOUS COMORBIDITY AND BODY MASS INDEX (BMI) OF 30.0 TO 30.9 IN ADULT: ICD-10-CM

## 2022-07-18 DIAGNOSIS — E05.90 SUBCLINICAL HYPERTHYROIDISM: ICD-10-CM

## 2022-07-18 DIAGNOSIS — E55.9 HYPOVITAMINOSIS D: ICD-10-CM

## 2022-07-18 PROCEDURE — 3078F DIAST BP <80 MM HG: CPT | Mod: CPTII,S$GLB,, | Performed by: HOSPITALIST

## 2022-07-18 PROCEDURE — 3044F HG A1C LEVEL LT 7.0%: CPT | Mod: CPTII,S$GLB,, | Performed by: HOSPITALIST

## 2022-07-18 PROCEDURE — 3074F SYST BP LT 130 MM HG: CPT | Mod: CPTII,S$GLB,, | Performed by: HOSPITALIST

## 2022-07-18 PROCEDURE — 3008F BODY MASS INDEX DOCD: CPT | Mod: CPTII,S$GLB,, | Performed by: HOSPITALIST

## 2022-07-18 PROCEDURE — 99214 OFFICE O/P EST MOD 30 MIN: CPT | Mod: S$GLB,,, | Performed by: HOSPITALIST

## 2022-07-18 PROCEDURE — 3044F PR MOST RECENT HEMOGLOBIN A1C LEVEL <7.0%: ICD-10-PCS | Mod: CPTII,S$GLB,, | Performed by: HOSPITALIST

## 2022-07-18 PROCEDURE — 99999 PR PBB SHADOW E&M-EST. PATIENT-LVL IV: ICD-10-PCS | Mod: PBBFAC,,, | Performed by: HOSPITALIST

## 2022-07-18 PROCEDURE — 1160F PR REVIEW ALL MEDS BY PRESCRIBER/CLIN PHARMACIST DOCUMENTED: ICD-10-PCS | Mod: CPTII,S$GLB,, | Performed by: HOSPITALIST

## 2022-07-18 PROCEDURE — 1160F RVW MEDS BY RX/DR IN RCRD: CPT | Mod: CPTII,S$GLB,, | Performed by: HOSPITALIST

## 2022-07-18 PROCEDURE — 4010F PR ACE/ARB THEARPY RXD/TAKEN: ICD-10-PCS | Mod: CPTII,S$GLB,, | Performed by: HOSPITALIST

## 2022-07-18 PROCEDURE — 1159F MED LIST DOCD IN RCRD: CPT | Mod: CPTII,S$GLB,, | Performed by: HOSPITALIST

## 2022-07-18 PROCEDURE — 99999 PR PBB SHADOW E&M-EST. PATIENT-LVL IV: CPT | Mod: PBBFAC,,, | Performed by: HOSPITALIST

## 2022-07-18 PROCEDURE — 3008F PR BODY MASS INDEX (BMI) DOCUMENTED: ICD-10-PCS | Mod: CPTII,S$GLB,, | Performed by: HOSPITALIST

## 2022-07-18 PROCEDURE — 4010F ACE/ARB THERAPY RXD/TAKEN: CPT | Mod: CPTII,S$GLB,, | Performed by: HOSPITALIST

## 2022-07-18 PROCEDURE — 3078F PR MOST RECENT DIASTOLIC BLOOD PRESSURE < 80 MM HG: ICD-10-PCS | Mod: CPTII,S$GLB,, | Performed by: HOSPITALIST

## 2022-07-18 PROCEDURE — 3074F PR MOST RECENT SYSTOLIC BLOOD PRESSURE < 130 MM HG: ICD-10-PCS | Mod: CPTII,S$GLB,, | Performed by: HOSPITALIST

## 2022-07-18 PROCEDURE — 99214 PR OFFICE/OUTPT VISIT, EST, LEVL IV, 30-39 MIN: ICD-10-PCS | Mod: S$GLB,,, | Performed by: HOSPITALIST

## 2022-07-18 PROCEDURE — 1159F PR MEDICATION LIST DOCUMENTED IN MEDICAL RECORD: ICD-10-PCS | Mod: CPTII,S$GLB,, | Performed by: HOSPITALIST

## 2022-07-18 RX ORDER — DULAGLUTIDE 0.75 MG/.5ML
INJECTION, SOLUTION SUBCUTANEOUS
COMMUNITY
Start: 2022-07-11 | End: 2022-07-18

## 2022-07-18 NOTE — PROGRESS NOTES
Subjective:      Patient ID: Blas Alcocer is a 62 y.o. male presented to [facility] on 7/18/2022.  Chief Complaint:  Prediabetes      History of Present Illness: Blas Alcocer is a 62 y.o. 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    Recent broken R foot has limited his exercise  Still with weight lost noted  A1C improved  Need help to learn how to check glucose at home      1) Subclinical hyperthyroidism   - Abnormal thyroid lab: Low TSH, normal free T4, subclinical hyperthyroidism>> longstanding history prior to gamma knife  - First diagnosed: 2015?, results shows decrease in TSH from 1192-5588.  Possibly worsen  post Gamma Knife treatment 2018  - Hormonal workup done showed normal antibodies:  Negative TSI, TPO  - No other pituitary issues:  Recent blood work low suspicion for hypopituitarism  - Patient denies any symptoms of hyperthyroidism including palpitation, tremors, weight loss  - Patient reports fatigue off and on otherwise does have issue with some weight gain. Patient denies headache, blurred vision  - No family history of thyroid disorder that he can recall  - Besides from Gamma Knife no other radiation exposure  - In the past had testosterone evaluated by PCP:  Normal, of note prior to gamma knife  - Did have 2 COVID infection asymptomatic per patient last 12/21  - Denies any use of:  Amiodarone/lithium  - No new medication.  Compliant with blood pressure medication amlodipine, lisinopril  - Herbal/Vitamin/Supplement:  Denies take Kelp, Iodine, Biotin, Selenium, Sea sarabia, Bladder wrack    Lab Results   Component Value Date    TSH 0.317 (L) 07/11/2022    TSH 0.295 (L) 04/12/2022    TSH 0.236 (L) 12/17/2021    TSH 0.341 (L) 12/02/2019    FREET4  0.96 07/11/2022    FREET4 0.96 04/12/2022    FREET4 0.91 12/17/2021    FREET4 0.89 12/02/2019    THYROIDSTIMI <0.10 04/12/2022    THYROPEROXID <6.0 04/12/2022           2) With regards to type 2 diabetes/prediabetes  - Diagnosed w/ DM:  prediabetes since 2019 with recent hyperglycemia on fasting blood work  - Patient is also interested in weight loss  - Does have have glucometer    Current meds:  Trulicity 0.75mg once a week      Weight trend: decrease noted  Diabetes Education: No  Diabetes Related Hospitalization:  No  Hx of pancreatitis, hx of thyroid cancer: No  Family history of diabetes: Yes  Occupation: working    Eye exam current (within one year): yes, DR: no  Reports cuts or ulcers on feet:   Denies    Statin: Not taking  ACE/ARB: Taking    Diabetes Management Status: Reviewed     A1C Trend  Lab Results   Component Value Date    HGBA1C 5.5 07/11/2022    HGBA1C 5.7 (H) 12/17/2021    HGBA1C 5.5 06/17/2020       No results found for: MICALBCREAT  No results found for: PJZTEJJA26  No results found for: TTGIGA    No results found for: CPEPTIDE, GLUTAMICACID, ISLETCELLANT, FRUCTOSAMINE     Screening or Prevention Patient's value Goal Complete/Controlled?   Lipid profile : 12/17/2021 Annually Yes   LDL control Lab Results   Component Value Date    LDLCALC 141.0 12/17/2021    Annually/Less than 100 mg/dl  No   Nephropathy screening No results found for: LABMICR  Lab Results   Component Value Date    PROTEINUA Negative 12/17/2021    Annually Yes   Blood pressure BP Readings from Last 1 Encounters:   07/18/22 100/66    Less than 140/90 Yes   Dilated retinal exam : 03/24/2022 Annually Yes   Foot exam   Most Recent Foot Exam Date: Not Found Annually Yes       Reviewed past surgical, medical, family, social history and updated as appropriate.    Review of Systems: see HPI above    Objective:   /66 (BP Location: Right arm)   Pulse 70   Temp 98.1 °F (36.7 °C) (Oral)   Wt 85.5 kg (188 lb 9.6 oz)   BMI 27.85  kg/m²     Body mass index is 27.85 kg/m².  Vital signs reviewed    Physical Exam  Vitals and nursing note reviewed.   Constitutional:       General: He is not in acute distress.     Appearance: Normal appearance. He is well-developed. He is obese. He is not toxic-appearing.   Neck:      Thyroid: No thyromegaly.      Comments: No goiter noted  Cardiovascular:      Heart sounds: Normal heart sounds.   Pulmonary:      Effort: Pulmonary effort is normal. No respiratory distress.   Abdominal:      Tenderness: There is no abdominal tenderness.   Musculoskeletal:         General: No deformity. Normal range of motion.      Cervical back: Normal range of motion.   Skin:     Findings: No bruising.   Neurological:      Mental Status: He is alert and oriented to person, place, and time.   Psychiatric:         Mood and Affect: Mood normal.         Lab Reviewed:   Lab Results   Component Value Date    HGBA1C 5.5 07/11/2022       Lab Results   Component Value Date    CHOL 206 (H) 12/17/2021    HDL 47 12/17/2021    LDLCALC 141.0 12/17/2021    TRIG 90 12/17/2021    CHOLHDL 22.8 12/17/2021       Lab Results   Component Value Date     07/11/2022    K 4.0 07/11/2022     07/11/2022    CO2 24 07/11/2022     (H) 07/11/2022    BUN 14 07/11/2022    CREATININE 1.0 07/11/2022    CALCIUM 9.2 07/11/2022    PROT 6.8 04/12/2022    ALBUMIN 4.0 04/12/2022    ALBUMIN 4.3 04/12/2022    BILITOT 1.0 04/12/2022    ALKPHOS 47 (L) 04/12/2022    AST 24 04/12/2022    ALT 29 04/12/2022    ANIONGAP 10 07/11/2022    ESTGFRAFRICA >60 07/11/2022    EGFRNONAA >60 07/11/2022    TSH 0.317 (L) 07/11/2022        Lab Results   Component Value Date    RMDHBYJF85FH 23 (L) 01/05/2016    IYMSSTBQ68FG 35 10/25/2013    IBNRIDIZ30JL 25 (L) 06/18/2013    CALCIUM 9.2 07/11/2022    CALCIUM 9.4 04/12/2022    CALCIUM 9.4 12/17/2021    ALKPHOS 47 (L) 04/12/2022    ALKPHOS 55 12/17/2021    ALKPHOS 52 (L) 06/17/2020    TSH 0.317 (L) 07/11/2022       Assessment      1. Type 2 diabetes mellitus with hyperglycemia, without long-term current use of insulin  dulaglutide (TRULICITY) 1.5 mg/0.5 mL pen injector    Hemoglobin A1C    TSH    T4, Free    Microalbumin/Creatinine Ratio, Urine    Basic Metabolic Panel   2. Subclinical hyperthyroidism  Vitamin D   3. Primary hypertension  Basic Metabolic Panel   4. Class 1 obesity due to excess calories with serious comorbidity and body mass index (BMI) of 30.0 to 30.9 in adult     5. Hypovitaminosis D          Plan     Type 2 diabetes mellitus with hyperglycemia, without long-term current use of insulin  - history of prediabetes for many years, now with worsening hyperglycemia weight gain in 2/2022  - treat as DM given longstand hx of prediabetes  - A1C is improving on therapy  - Increase Trulicity to 1.5mg once a week injection   - discussed with patient he is monitoring his diet  - my LPN taught patient how to use his glucometer in clinic today  - Repeat lab work in 6 months for monitoring    Subclinical hyperthyroidism  - patient with longstanding history of low TSH with normal free T4,  since 2015  - idiopathic given negative TPO/TSI antibodies  - do not suspect hyperthyroidism given lack of symptoms.  With normal free T4. ? secondary dysfunction in setting of gamma knife  - rule out pituitary deficiency:  Normal hormonal workup as above  - can consider uptake scan in the future, at this time no need for further workup, no need for medication given WNL FT4 and no symptoms  - repeat lab work in 6 months to re-evaluate    Class 1 obesity due to excess calories with serious comorbidity and body mass index (BMI) of 30.0 to 30.9 in adult  - Body mass index is 27.85 kg/m².  - dietary discussion as above  - continue to monitor weight  - weight lost noted  - increase GLP1  - encourage continue exercise    Hypovitaminosis D  - lab work reviewed, low in 2016  - check lab work in the future       Advised patient to follow up with PCP for  routine health maintenance care.   RTC in 6 months      Mckinley Romero M.D.  Endocrinology  [facility]  7/18/2022      Disclaimer: This note has been generated in part with the use of voice-recognition software. There may be typographical errors that have been missed during proof-reading.

## 2022-07-18 NOTE — PATIENT INSTRUCTIONS
Goal blood sugar in the morning, before breakfast:   Glucose goal AFTER MEALS:    2 Hour after: less than 140  Before going to bed:

## 2022-07-19 NOTE — ASSESSMENT & PLAN NOTE
- history of prediabetes for many years, now with worsening hyperglycemia weight gain in 2/2022  - treat as DM given longstand hx of prediabetes  - A1C is improving on therapy  - Increase Trulicity to 1.5mg once a week injection   - discussed with patient he is monitoring his diet  - my LPN taught patient how to use his glucometer in clinic today  - Repeat lab work in 6 months for monitoring

## 2022-07-19 NOTE — ASSESSMENT & PLAN NOTE
- Body mass index is 27.85 kg/m².  - dietary discussion as above  - continue to monitor weight  - weight lost noted  - increase GLP1  - encourage continue exercise

## 2022-07-19 NOTE — ASSESSMENT & PLAN NOTE
- patient with longstanding history of low TSH with normal free T4,  since 2015  - idiopathic given negative TPO/TSI antibodies  - do not suspect hyperthyroidism given lack of symptoms.  With normal free T4. ? secondary dysfunction in setting of gamma knife  - rule out pituitary deficiency:  Normal hormonal workup as above  - can consider uptake scan in the future, at this time no need for further workup, no need for medication given WNL FT4 and no symptoms  - repeat lab work in 6 months to re-evaluate

## 2022-07-22 ENCOUNTER — OFFICE VISIT (OUTPATIENT)
Dept: FAMILY MEDICINE | Facility: CLINIC | Age: 62
End: 2022-07-22
Payer: COMMERCIAL

## 2022-07-22 VITALS
DIASTOLIC BLOOD PRESSURE: 82 MMHG | RESPIRATION RATE: 18 BRPM | SYSTOLIC BLOOD PRESSURE: 120 MMHG | OXYGEN SATURATION: 98 % | HEART RATE: 92 BPM | HEIGHT: 69 IN | WEIGHT: 184.75 LBS | TEMPERATURE: 98 F | BODY MASS INDEX: 27.36 KG/M2

## 2022-07-22 DIAGNOSIS — I10 PRIMARY HYPERTENSION: ICD-10-CM

## 2022-07-22 DIAGNOSIS — Z00.00 ANNUAL PHYSICAL EXAM: Primary | ICD-10-CM

## 2022-07-22 PROCEDURE — 3044F HG A1C LEVEL LT 7.0%: CPT | Mod: CPTII,S$GLB,, | Performed by: FAMILY MEDICINE

## 2022-07-22 PROCEDURE — 3008F PR BODY MASS INDEX (BMI) DOCUMENTED: ICD-10-PCS | Mod: CPTII,S$GLB,, | Performed by: FAMILY MEDICINE

## 2022-07-22 PROCEDURE — 1160F RVW MEDS BY RX/DR IN RCRD: CPT | Mod: CPTII,S$GLB,, | Performed by: FAMILY MEDICINE

## 2022-07-22 PROCEDURE — 3074F SYST BP LT 130 MM HG: CPT | Mod: CPTII,S$GLB,, | Performed by: FAMILY MEDICINE

## 2022-07-22 PROCEDURE — 99999 PR PBB SHADOW E&M-EST. PATIENT-LVL IV: ICD-10-PCS | Mod: PBBFAC,,, | Performed by: FAMILY MEDICINE

## 2022-07-22 PROCEDURE — 3044F PR MOST RECENT HEMOGLOBIN A1C LEVEL <7.0%: ICD-10-PCS | Mod: CPTII,S$GLB,, | Performed by: FAMILY MEDICINE

## 2022-07-22 PROCEDURE — 99999 PR PBB SHADOW E&M-EST. PATIENT-LVL IV: CPT | Mod: PBBFAC,,, | Performed by: FAMILY MEDICINE

## 2022-07-22 PROCEDURE — 99396 PR PREVENTIVE VISIT,EST,40-64: ICD-10-PCS | Mod: S$GLB,,, | Performed by: FAMILY MEDICINE

## 2022-07-22 PROCEDURE — 3079F PR MOST RECENT DIASTOLIC BLOOD PRESSURE 80-89 MM HG: ICD-10-PCS | Mod: CPTII,S$GLB,, | Performed by: FAMILY MEDICINE

## 2022-07-22 PROCEDURE — 1159F PR MEDICATION LIST DOCUMENTED IN MEDICAL RECORD: ICD-10-PCS | Mod: CPTII,S$GLB,, | Performed by: FAMILY MEDICINE

## 2022-07-22 PROCEDURE — 3079F DIAST BP 80-89 MM HG: CPT | Mod: CPTII,S$GLB,, | Performed by: FAMILY MEDICINE

## 2022-07-22 PROCEDURE — 4010F PR ACE/ARB THEARPY RXD/TAKEN: ICD-10-PCS | Mod: CPTII,S$GLB,, | Performed by: FAMILY MEDICINE

## 2022-07-22 PROCEDURE — 3074F PR MOST RECENT SYSTOLIC BLOOD PRESSURE < 130 MM HG: ICD-10-PCS | Mod: CPTII,S$GLB,, | Performed by: FAMILY MEDICINE

## 2022-07-22 PROCEDURE — 99396 PREV VISIT EST AGE 40-64: CPT | Mod: S$GLB,,, | Performed by: FAMILY MEDICINE

## 2022-07-22 PROCEDURE — 1159F MED LIST DOCD IN RCRD: CPT | Mod: CPTII,S$GLB,, | Performed by: FAMILY MEDICINE

## 2022-07-22 PROCEDURE — 1160F PR REVIEW ALL MEDS BY PRESCRIBER/CLIN PHARMACIST DOCUMENTED: ICD-10-PCS | Mod: CPTII,S$GLB,, | Performed by: FAMILY MEDICINE

## 2022-07-22 PROCEDURE — 3008F BODY MASS INDEX DOCD: CPT | Mod: CPTII,S$GLB,, | Performed by: FAMILY MEDICINE

## 2022-07-22 PROCEDURE — 4010F ACE/ARB THERAPY RXD/TAKEN: CPT | Mod: CPTII,S$GLB,, | Performed by: FAMILY MEDICINE

## 2022-07-22 NOTE — PROGRESS NOTES
Chief Complaint   Patient presents with    Annual Exam       SUBJECTIVE:   Blas Alcocer is a 62 y.o. male presenting for his annual checkup.   Current Outpatient Medications   Medication Sig Dispense Refill    amLODIPine (NORVASC) 10 MG tablet Take 1 tablet (10 mg total) by mouth once daily. 90 tablet 1    dulaglutide (TRULICITY) 1.5 mg/0.5 mL pen injector Inject 1.5 mg into the skin once a week. 12 pen 3    fish oil-omega-3 fatty acids 300-1,000 mg capsule Take 2 g by mouth once daily.      GAVILYTE-G 236-22.74-6.74 -5.86 gram suspension Take 4,000 mLs by mouth once.      lisinopriL-hydrochlorothiazide (PRINZIDE,ZESTORETIC) 20-12.5 mg per tablet Take 1 tablet by mouth once daily. 90 tablet 3    saw palmetto 80 MG capsule Take 80 mg by mouth once daily.       Current Facility-Administered Medications   Medication Dose Route Frequency Provider Last Rate Last Admin    gentamicin injection 80 mg  80 mg Intramuscular 1 time in Clinic/HOD ERIN Mckeon MD         Allergies: Patient has no known allergies.   Patient Active Problem List    Diagnosis Date Noted    Subclinical hyperthyroidism 07/18/2022    Type 2 diabetes mellitus with hyperglycemia, without long-term current use of insulin 07/18/2022    Abnormal thyroid blood test 04/18/2022    Class 1 obesity due to excess calories with serious comorbidity and body mass index (BMI) of 30.0 to 30.9 in adult 04/18/2022    Heel pain, bilateral 04/25/2019    Esotropia 02/14/2019    Wears glasses 07/07/2017    Brain mass 01/04/2017    Sixth nerve palsy of right eye 12/06/2016    Hypertension 08/19/2014    Hypertriglyceridemia     Prediabetes     Hypovitaminosis D        ROS:  Feeling well. No dyspnea or chest pain on exertion. No abdominal pain, change in bowel habits, black or bloody stools. No urinary tract or prostatic symptoms. No neurological complaints.    OBJECTIVE:   The patient appears well, alert, oriented x 3, in no distress.   /82  "(BP Location: Left arm, Patient Position: Sitting, BP Method: Large (Manual))   Pulse 92   Temp 98.1 °F (36.7 °C) (Oral)   Resp 18   Ht 5' 9" (1.753 m)   Wt 83.8 kg (184 lb 11.9 oz)   SpO2 98%   BMI 27.28 kg/m²   Wt Readings from Last 5 Encounters:   07/22/22 83.8 kg (184 lb 11.9 oz)   07/18/22 85.5 kg (188 lb 9.6 oz)   07/15/22 81.6 kg (180 lb)   04/18/22 90.8 kg (200 lb 1.6 oz)   03/23/22 90 kg (198 lb 6.6 oz)       ENT normal.  Neck supple. No adenopathy or thyromegaly. KEVIN. Lungs are clear, good air entry, no wheezes, rhonchi or rales. S1 and S2 normal, no murmurs, regular rate and rhythm. Abdomen is soft without tenderness, guarding, mass or organomegaly.  exam: deferred.  Extremities show no edema, normal peripheral pulses. Neurological is normal without focal findings.    ASSESSMENT:   1. Annual physical exam          PLAN:   Counseled on age appropriate medical preventative services, including age appropriate cancer screenings, over all nutritional health, need for a consistent exercise regimen and an over all push towards maintaining a vigorous and active lifestyle.  Counseled on age appropriate vaccines and discussed upcoming health care needs based on age/gender.  Spent time with patient counseling on need for a good patient/doctor relationship moving forward.  Discussed use of common OTC medications and supplements.  Discussed common dietary aids and use of caffeine and the need for good sleep hygiene and stress management.    Problem List Items Addressed This Visit    None     Visit Diagnoses     Annual physical exam    -  Primary          Continue with his medication  He is doing really well currently  "

## 2022-07-26 ENCOUNTER — PATIENT MESSAGE (OUTPATIENT)
Dept: ENDOCRINOLOGY | Facility: CLINIC | Age: 62
End: 2022-07-26
Payer: COMMERCIAL

## 2022-08-02 ENCOUNTER — PATIENT MESSAGE (OUTPATIENT)
Dept: UROLOGY | Facility: CLINIC | Age: 62
End: 2022-08-02
Payer: COMMERCIAL

## 2022-08-05 ENCOUNTER — PATIENT MESSAGE (OUTPATIENT)
Dept: UROLOGY | Facility: CLINIC | Age: 62
End: 2022-08-05
Payer: COMMERCIAL

## 2022-08-25 ENCOUNTER — LAB VISIT (OUTPATIENT)
Dept: LAB | Facility: HOSPITAL | Age: 62
End: 2022-08-25
Attending: UROLOGY
Payer: COMMERCIAL

## 2022-08-25 DIAGNOSIS — R97.20 ELEVATED PSA: ICD-10-CM

## 2022-08-25 LAB — COMPLEXED PSA SERPL-MCNC: 5.1 NG/ML (ref 0–4)

## 2022-08-25 PROCEDURE — 84153 ASSAY OF PSA TOTAL: CPT | Performed by: UROLOGY

## 2022-08-25 PROCEDURE — 36415 COLL VENOUS BLD VENIPUNCTURE: CPT | Mod: PO | Performed by: UROLOGY

## 2022-09-08 ENCOUNTER — OFFICE VISIT (OUTPATIENT)
Dept: UROLOGY | Facility: CLINIC | Age: 62
End: 2022-09-08
Payer: COMMERCIAL

## 2022-09-08 VITALS — WEIGHT: 186.19 LBS | HEIGHT: 69 IN | BODY MASS INDEX: 27.58 KG/M2

## 2022-09-08 DIAGNOSIS — N40.0 BPH WITHOUT OBSTRUCTION/LOWER URINARY TRACT SYMPTOMS: ICD-10-CM

## 2022-09-08 DIAGNOSIS — R97.20 ELEVATED PSA: Primary | ICD-10-CM

## 2022-09-08 DIAGNOSIS — R35.0 URINARY FREQUENCY: ICD-10-CM

## 2022-09-08 LAB
BILIRUB SERPL-MCNC: NEGATIVE MG/DL
BLOOD URINE, POC: NORMAL
COLOR, POC UA: YELLOW
GLUCOSE UR QL STRIP: NORMAL
KETONES UR QL STRIP: NEGATIVE
LEUKOCYTE ESTERASE URINE, POC: NEGATIVE
NITRITE, POC UA: NEGATIVE
PH, POC UA: 5
PROTEIN, POC: NORMAL
SPECIFIC GRAVITY, POC UA: 1020
UROBILINOGEN, POC UA: NORMAL

## 2022-09-08 PROCEDURE — 99999 PR PBB SHADOW E&M-EST. PATIENT-LVL III: ICD-10-PCS | Mod: PBBFAC,,, | Performed by: UROLOGY

## 2022-09-08 PROCEDURE — 1160F RVW MEDS BY RX/DR IN RCRD: CPT | Mod: CPTII,S$GLB,, | Performed by: UROLOGY

## 2022-09-08 PROCEDURE — 1159F PR MEDICATION LIST DOCUMENTED IN MEDICAL RECORD: ICD-10-PCS | Mod: CPTII,S$GLB,, | Performed by: UROLOGY

## 2022-09-08 PROCEDURE — 3044F PR MOST RECENT HEMOGLOBIN A1C LEVEL <7.0%: ICD-10-PCS | Mod: CPTII,S$GLB,, | Performed by: UROLOGY

## 2022-09-08 PROCEDURE — 4010F ACE/ARB THERAPY RXD/TAKEN: CPT | Mod: CPTII,S$GLB,, | Performed by: UROLOGY

## 2022-09-08 PROCEDURE — 81001 URINALYSIS AUTO W/SCOPE: CPT | Mod: S$GLB,,, | Performed by: UROLOGY

## 2022-09-08 PROCEDURE — 1160F PR REVIEW ALL MEDS BY PRESCRIBER/CLIN PHARMACIST DOCUMENTED: ICD-10-PCS | Mod: CPTII,S$GLB,, | Performed by: UROLOGY

## 2022-09-08 PROCEDURE — 99214 PR OFFICE/OUTPT VISIT, EST, LEVL IV, 30-39 MIN: ICD-10-PCS | Mod: S$GLB,,, | Performed by: UROLOGY

## 2022-09-08 PROCEDURE — 81001 POCT URINALYSIS, DIPSTICK OR TABLET REAGENT, AUTOMATED, WITH MICROSCOP: ICD-10-PCS | Mod: S$GLB,,, | Performed by: UROLOGY

## 2022-09-08 PROCEDURE — 3008F PR BODY MASS INDEX (BMI) DOCUMENTED: ICD-10-PCS | Mod: CPTII,S$GLB,, | Performed by: UROLOGY

## 2022-09-08 PROCEDURE — 3008F BODY MASS INDEX DOCD: CPT | Mod: CPTII,S$GLB,, | Performed by: UROLOGY

## 2022-09-08 PROCEDURE — 99999 PR PBB SHADOW E&M-EST. PATIENT-LVL III: CPT | Mod: PBBFAC,,, | Performed by: UROLOGY

## 2022-09-08 PROCEDURE — 3044F HG A1C LEVEL LT 7.0%: CPT | Mod: CPTII,S$GLB,, | Performed by: UROLOGY

## 2022-09-08 PROCEDURE — 4010F PR ACE/ARB THEARPY RXD/TAKEN: ICD-10-PCS | Mod: CPTII,S$GLB,, | Performed by: UROLOGY

## 2022-09-08 PROCEDURE — 1159F MED LIST DOCD IN RCRD: CPT | Mod: CPTII,S$GLB,, | Performed by: UROLOGY

## 2022-09-08 PROCEDURE — 99214 OFFICE O/P EST MOD 30 MIN: CPT | Mod: S$GLB,,, | Performed by: UROLOGY

## 2022-09-08 NOTE — PROGRESS NOTES
Subjective:       Patient ID: Blas Alcocer is a 62 y.o. male The patient's last visit with me was on 3/23/2022.      Chief Complaint:   Chief Complaint   Patient presents with    Follow-up         Elevated PSA    He underwent a prostate needle biopsy on 2/15/2022.  His biopsy was indicated due to: Elevated PSA.  Afterwards he experienced: Gross Hematuria and Blood in stool.  These symptoms have resolved.  His PSA prior to biopsy was 4.9.  His prostate size was 85 grams.  The ultrasound did not show a median lobe.  He currently does not have erectile dysfunction.  His pathology showed: benign disease.    09/08/2022  He is back with a new PSA.         ACTIVE MEDICAL ISSUES:  Patient Active Problem List   Diagnosis    Hypertriglyceridemia    Prediabetes    Hypovitaminosis D    Hypertension    Sixth nerve palsy of right eye    Brain mass    Wears glasses    Esotropia    Heel pain, bilateral    Abnormal thyroid blood test    Class 1 obesity due to excess calories with serious comorbidity and body mass index (BMI) of 30.0 to 30.9 in adult    Subclinical hyperthyroidism    Type 2 diabetes mellitus with hyperglycemia, without long-term current use of insulin       ALLERGIES AND MEDICATIONS: updated and reviewed.  Review of patient's allergies indicates:  No Known Allergies  Current Outpatient Medications   Medication Sig    amLODIPine (NORVASC) 10 MG tablet Take 1 tablet (10 mg total) by mouth once daily.    dulaglutide (TRULICITY) 1.5 mg/0.5 mL pen injector Inject 1.5 mg into the skin once a week.    fish oil-omega-3 fatty acids 300-1,000 mg capsule Take 2 g by mouth once daily.    GAVILYTE-G 236-22.74-6.74 -5.86 gram suspension Take 4,000 mLs by mouth once.    lisinopriL-hydrochlorothiazide (PRINZIDE,ZESTORETIC) 20-12.5 mg per tablet Take 1 tablet by mouth once daily.    saw palmetto 80 MG capsule Take 80 mg by mouth once daily.     Current Facility-Administered Medications   Medication    gentamicin injection 80 mg  "      Review of Systems   Constitutional:  Negative for activity change, fatigue, fever and unexpected weight change.   HENT:  Negative for congestion.    Eyes:  Negative for redness.   Respiratory:  Negative for chest tightness and shortness of breath.    Cardiovascular:  Negative for chest pain and leg swelling.   Gastrointestinal:  Negative for abdominal pain, constipation, diarrhea, nausea and vomiting.   Genitourinary:  Negative for dysuria, flank pain, frequency, hematuria, penile pain, penile swelling, scrotal swelling, testicular pain and urgency.   Musculoskeletal:  Negative for arthralgias and back pain.   Neurological:  Negative for dizziness and light-headedness.   Psychiatric/Behavioral:  Negative for behavioral problems and confusion. The patient is not nervous/anxious.    All other systems reviewed and are negative.    Objective:      Vitals:    09/08/22 0835   Weight: 84.5 kg (186 lb 2.9 oz)   Height: 5' 9" (1.753 m)       Physical Exam  Vitals and nursing note reviewed.   Constitutional:       Appearance: He is well-developed.   HENT:      Head: Normocephalic.   Eyes:      Conjunctiva/sclera: Conjunctivae normal.   Neck:      Thyroid: No thyromegaly.      Trachea: No tracheal deviation.   Cardiovascular:      Rate and Rhythm: Normal rate.      Heart sounds: Normal heart sounds.   Pulmonary:      Effort: Pulmonary effort is normal. No respiratory distress.      Breath sounds: Normal breath sounds. No wheezing.   Abdominal:      General: Bowel sounds are normal.      Palpations: Abdomen is soft.      Tenderness: There is no abdominal tenderness. There is no rebound.      Hernia: No hernia is present.   Musculoskeletal:         General: No tenderness. Normal range of motion.      Cervical back: Normal range of motion and neck supple.   Lymphadenopathy:      Cervical: No cervical adenopathy.   Skin:     General: Skin is warm and dry.      Findings: No erythema or rash.   Neurological:      Mental " Status: He is alert and oriented to person, place, and time.   Psychiatric:         Behavior: Behavior normal.         Thought Content: Thought content normal.         Judgment: Judgment normal.       Urine dipstick shows negative for all components.  Micro exam: negative for WBC's or RBC's.     Component Ref Range & Units 2 wk ago 7 yr ago   PSA Diagnostic 0.00 - 4.00 ng/mL 5.1 High   3.6 CM    Comment: The testing method is a chemiluminescent microparticle immunoassay   manufactured by [employer] and performed on the [email or web address]   or   [employer] system. Values obtained with different assay manufacturers   for   methods may be different and cannot be used interchangeably.   PSA Expected levels:   Hormonal Therapy: <0.05 ng/ml   Prostatectomy: <0.01 ng/ml   Radiation Therapy: <1.00 ng/ml    Resulting Agency  [facility]           Narrative  Performed by: OCOLY  6 months      Specimen Collected: 08/25/22 07:45 Last Resulted: 08/25/22 17:18               Assessment:       1. Elevated PSA    2. BPH without obstruction/lower urinary tract symptoms    3. Urinary frequency            Plan:       1. Elevated PSA  We talked about repeat the blood test vs MRI.    - PSA, Total and Free; Future    2. BPH without obstruction/lower urinary tract symptoms    - POCT urinalysis, dipstick or tablet reag    3. Urinary frequency               Follow up in about 6 months (around 3/8/2023) for Follow up, Review labs.

## 2022-11-22 ENCOUNTER — PATIENT MESSAGE (OUTPATIENT)
Dept: FAMILY MEDICINE | Facility: CLINIC | Age: 62
End: 2022-11-22
Payer: COMMERCIAL

## 2022-11-22 ENCOUNTER — IMMUNIZATION (OUTPATIENT)
Dept: OBSTETRICS AND GYNECOLOGY | Facility: CLINIC | Age: 62
End: 2022-11-22
Payer: COMMERCIAL

## 2022-11-22 DIAGNOSIS — Z23 NEED FOR VACCINATION: Primary | ICD-10-CM

## 2022-11-22 PROCEDURE — 91312 COVID-19, MRNA, LNP-S, BIVALENT BOOSTER, PF, 30 MCG/0.3 ML DOSE: CPT | Mod: S$GLB,,, | Performed by: FAMILY MEDICINE

## 2022-11-22 PROCEDURE — 91312 COVID-19, MRNA, LNP-S, BIVALENT BOOSTER, PF, 30 MCG/0.3 ML DOSE: ICD-10-PCS | Mod: S$GLB,,, | Performed by: FAMILY MEDICINE

## 2022-11-22 PROCEDURE — 0124A COVID-19, MRNA, LNP-S, BIVALENT BOOSTER, PF, 30 MCG/0.3 ML DOSE: CPT | Mod: PBBFAC | Performed by: FAMILY MEDICINE

## 2022-12-12 NOTE — LETTER
April 18, 2019     Blas Alcocer  [address] [address]       Dear Blas,    Welcome to [facility] [employer]! Our goal is to make care effective, proactive and convenient by using data you send us from home to better treat your chronic conditions.        My name is Valery Haywood, and I am your dedicated Digital Medicine clinician. As an expert in medication management, I will help ensure that the medications you are taking continue to provide the intended benefits and help you reach your goals. You can reach me directly at [phone] or by sending me a message directly through your [facility] account.      I am Jaye Smith and I will be your health . My job is to help you identify lifestyle changes to improve your disease control. We will talk about nutrition, exercise, and other ways you may be able to adjust your current habits to better your health. Additionally, we will help ensure you are completing the tests and screenings that are necessary to help manage your conditions. You can reach me directly at [phone] or by sending me a message directly through your [facility] account.    Most importantly, YOU are at the center of this team. Together, we will work to improve your overall health and encourage you to meet your goals for a healthier lifestyle.     What we expect from YOU:  · Please take frequent home blood pressure measurements. We ask that you take at least 1 blood pressure reading per week, but more information will better help us get you know you. Be sure you rest for a few minutes before taking the reading in a quiet, comfortable place.     Be available to receive phone calls or [facility] messages, when appropriate, from your care team. Please let us know if there are any specific days or times that work best for us to reach you via phone.     Complete routine tests and screenings. Dont worry, we will help keep you on track!           What you should  expect from your Digital Medicine Care Team:   We will work with you to create a personalized plan of care and provide you with encouragement and education, including regarding lifestyle changes, that could help you manage your disease states.     We will adjust your current medications, if needed, and continue to monitor your long-term progress.     We will provide you and your physician with monthly progress reports after you have been in the program for more than 30 days.     We will send you reminders through [facility] and text messages to help ensure you do not miss any testing deadlines to help manage your disease states.    You will be able to reach us by phone or through your [facility] account by clicking our names under Care Team on the right side of the home screen.    I look forward to working with you to achieve your blood pressure goals!    We look forward to working with you to help manage your health,    Sincerely,    Your Digital Medicine Team    Please visit our websites to learn more:   · Hypertension: [email or web address]      Remember, we are not available for emergencies. If you have an emergency, please contact your doctors office directly or call [facility] on-call ([phone] or [phone]) or [phone].          No

## 2023-01-11 ENCOUNTER — LAB VISIT (OUTPATIENT)
Dept: LAB | Facility: HOSPITAL | Age: 63
End: 2023-01-11
Attending: UROLOGY
Payer: COMMERCIAL

## 2023-01-11 DIAGNOSIS — E11.65 TYPE 2 DIABETES MELLITUS WITH HYPERGLYCEMIA, WITHOUT LONG-TERM CURRENT USE OF INSULIN: ICD-10-CM

## 2023-01-11 DIAGNOSIS — E05.90 SUBCLINICAL HYPERTHYROIDISM: ICD-10-CM

## 2023-01-11 DIAGNOSIS — R97.20 ELEVATED PSA: ICD-10-CM

## 2023-01-11 DIAGNOSIS — I10 PRIMARY HYPERTENSION: ICD-10-CM

## 2023-01-11 LAB
25(OH)D3+25(OH)D2 SERPL-MCNC: 26 NG/ML (ref 30–96)
ANION GAP SERPL CALC-SCNC: 9 MMOL/L (ref 8–16)
BUN SERPL-MCNC: 14 MG/DL (ref 8–23)
CALCIUM SERPL-MCNC: 8.8 MG/DL (ref 8.7–10.5)
CHLORIDE SERPL-SCNC: 104 MMOL/L (ref 95–110)
CO2 SERPL-SCNC: 26 MMOL/L (ref 23–29)
COMPLEXED PSA SERPL-MCNC: 5.5 NG/ML (ref 0–4)
CREAT SERPL-MCNC: 0.9 MG/DL (ref 0.5–1.4)
EST. GFR  (NO RACE VARIABLE): >60 ML/MIN/1.73 M^2
ESTIMATED AVG GLUCOSE: 108 MG/DL (ref 68–131)
GLUCOSE SERPL-MCNC: 118 MG/DL (ref 70–110)
HBA1C MFR BLD: 5.4 % (ref 4–5.6)
POTASSIUM SERPL-SCNC: 4.3 MMOL/L (ref 3.5–5.1)
SODIUM SERPL-SCNC: 139 MMOL/L (ref 136–145)
T4 FREE SERPL-MCNC: 1.01 NG/DL (ref 0.71–1.51)
TSH SERPL DL<=0.005 MIU/L-ACNC: 0.34 UIU/ML (ref 0.4–4)

## 2023-01-11 PROCEDURE — 84153 ASSAY OF PSA TOTAL: CPT | Performed by: UROLOGY

## 2023-01-11 PROCEDURE — 83036 HEMOGLOBIN GLYCOSYLATED A1C: CPT | Performed by: HOSPITALIST

## 2023-01-11 PROCEDURE — 82306 VITAMIN D 25 HYDROXY: CPT | Performed by: HOSPITALIST

## 2023-01-11 PROCEDURE — 36415 COLL VENOUS BLD VENIPUNCTURE: CPT | Mod: PO | Performed by: UROLOGY

## 2023-01-11 PROCEDURE — 84439 ASSAY OF FREE THYROXINE: CPT | Performed by: HOSPITALIST

## 2023-01-11 PROCEDURE — 84443 ASSAY THYROID STIM HORMONE: CPT | Performed by: HOSPITALIST

## 2023-01-11 PROCEDURE — 80048 BASIC METABOLIC PNL TOTAL CA: CPT | Performed by: HOSPITALIST

## 2023-01-12 DIAGNOSIS — I10 PRIMARY HYPERTENSION: ICD-10-CM

## 2023-01-12 RX ORDER — AMLODIPINE BESYLATE 10 MG/1
10 TABLET ORAL DAILY
Qty: 90 TABLET | Refills: 3 | Status: SHIPPED | OUTPATIENT
Start: 2023-01-12 | End: 2023-01-13 | Stop reason: SDUPTHER

## 2023-01-19 ENCOUNTER — PATIENT MESSAGE (OUTPATIENT)
Dept: ENDOCRINOLOGY | Facility: CLINIC | Age: 63
End: 2023-01-19
Payer: COMMERCIAL

## 2023-01-19 DIAGNOSIS — E11.65 TYPE 2 DIABETES MELLITUS WITH HYPERGLYCEMIA, WITHOUT LONG-TERM CURRENT USE OF INSULIN: ICD-10-CM

## 2023-02-07 ENCOUNTER — OFFICE VISIT (OUTPATIENT)
Dept: ENDOCRINOLOGY | Facility: CLINIC | Age: 63
End: 2023-02-07
Payer: COMMERCIAL

## 2023-02-07 VITALS
SYSTOLIC BLOOD PRESSURE: 131 MMHG | TEMPERATURE: 98 F | BODY MASS INDEX: 26.97 KG/M2 | WEIGHT: 182.63 LBS | HEART RATE: 73 BPM | DIASTOLIC BLOOD PRESSURE: 90 MMHG

## 2023-02-07 DIAGNOSIS — E05.90 SUBCLINICAL HYPERTHYROIDISM: ICD-10-CM

## 2023-02-07 DIAGNOSIS — E55.9 HYPOVITAMINOSIS D: ICD-10-CM

## 2023-02-07 DIAGNOSIS — E11.65 TYPE 2 DIABETES MELLITUS WITH HYPERGLYCEMIA, WITHOUT LONG-TERM CURRENT USE OF INSULIN: Primary | ICD-10-CM

## 2023-02-07 DIAGNOSIS — E66.09 CLASS 1 OBESITY DUE TO EXCESS CALORIES WITH SERIOUS COMORBIDITY AND BODY MASS INDEX (BMI) OF 30.0 TO 30.9 IN ADULT: ICD-10-CM

## 2023-02-07 PROCEDURE — 3080F PR MOST RECENT DIASTOLIC BLOOD PRESSURE >= 90 MM HG: ICD-10-PCS | Mod: CPTII,S$GLB,, | Performed by: HOSPITALIST

## 2023-02-07 PROCEDURE — 4010F PR ACE/ARB THEARPY RXD/TAKEN: ICD-10-PCS | Mod: CPTII,S$GLB,, | Performed by: HOSPITALIST

## 2023-02-07 PROCEDURE — 3061F PR NEG MICROALBUMINURIA RESULT DOCUMENTED/REVIEW: ICD-10-PCS | Mod: CPTII,S$GLB,, | Performed by: HOSPITALIST

## 2023-02-07 PROCEDURE — 1159F PR MEDICATION LIST DOCUMENTED IN MEDICAL RECORD: ICD-10-PCS | Mod: CPTII,S$GLB,, | Performed by: HOSPITALIST

## 2023-02-07 PROCEDURE — 3044F HG A1C LEVEL LT 7.0%: CPT | Mod: CPTII,S$GLB,, | Performed by: HOSPITALIST

## 2023-02-07 PROCEDURE — 3008F BODY MASS INDEX DOCD: CPT | Mod: CPTII,S$GLB,, | Performed by: HOSPITALIST

## 2023-02-07 PROCEDURE — 99214 OFFICE O/P EST MOD 30 MIN: CPT | Mod: S$GLB,,, | Performed by: HOSPITALIST

## 2023-02-07 PROCEDURE — 3061F NEG MICROALBUMINURIA REV: CPT | Mod: CPTII,S$GLB,, | Performed by: HOSPITALIST

## 2023-02-07 PROCEDURE — 3066F PR DOCUMENTATION OF TREATMENT FOR NEPHROPATHY: ICD-10-PCS | Mod: CPTII,S$GLB,, | Performed by: HOSPITALIST

## 2023-02-07 PROCEDURE — 3075F SYST BP GE 130 - 139MM HG: CPT | Mod: CPTII,S$GLB,, | Performed by: HOSPITALIST

## 2023-02-07 PROCEDURE — 99214 PR OFFICE/OUTPT VISIT, EST, LEVL IV, 30-39 MIN: ICD-10-PCS | Mod: S$GLB,,, | Performed by: HOSPITALIST

## 2023-02-07 PROCEDURE — 1159F MED LIST DOCD IN RCRD: CPT | Mod: CPTII,S$GLB,, | Performed by: HOSPITALIST

## 2023-02-07 PROCEDURE — 4010F ACE/ARB THERAPY RXD/TAKEN: CPT | Mod: CPTII,S$GLB,, | Performed by: HOSPITALIST

## 2023-02-07 PROCEDURE — 99999 PR PBB SHADOW E&M-EST. PATIENT-LVL III: ICD-10-PCS | Mod: PBBFAC,,, | Performed by: HOSPITALIST

## 2023-02-07 PROCEDURE — 3044F PR MOST RECENT HEMOGLOBIN A1C LEVEL <7.0%: ICD-10-PCS | Mod: CPTII,S$GLB,, | Performed by: HOSPITALIST

## 2023-02-07 PROCEDURE — 99999 PR PBB SHADOW E&M-EST. PATIENT-LVL III: CPT | Mod: PBBFAC,,, | Performed by: HOSPITALIST

## 2023-02-07 PROCEDURE — 3066F NEPHROPATHY DOC TX: CPT | Mod: CPTII,S$GLB,, | Performed by: HOSPITALIST

## 2023-02-07 PROCEDURE — 3080F DIAST BP >= 90 MM HG: CPT | Mod: CPTII,S$GLB,, | Performed by: HOSPITALIST

## 2023-02-07 PROCEDURE — 3008F PR BODY MASS INDEX (BMI) DOCUMENTED: ICD-10-PCS | Mod: CPTII,S$GLB,, | Performed by: HOSPITALIST

## 2023-02-07 PROCEDURE — 3075F PR MOST RECENT SYSTOLIC BLOOD PRESS GE 130-139MM HG: ICD-10-PCS | Mod: CPTII,S$GLB,, | Performed by: HOSPITALIST

## 2023-02-07 RX ORDER — ACETAMINOPHEN 500 MG
TABLET ORAL DAILY
COMMUNITY

## 2023-02-07 RX ORDER — DULAGLUTIDE 3 MG/.5ML
3 INJECTION, SOLUTION SUBCUTANEOUS
Qty: 12 PEN | Refills: 3 | Status: SHIPPED | OUTPATIENT
Start: 2023-02-07 | End: 2023-08-18 | Stop reason: SDUPTHER

## 2023-02-07 NOTE — ASSESSMENT & PLAN NOTE
- lab work reviewed, low   - advised patient to start ergocalciferol 2000 IU daily   - yearly monitor

## 2023-02-07 NOTE — ASSESSMENT & PLAN NOTE
- Body mass index is 26.97 kg/m².  - dietary discussion as above  - continue to monitor weight  - weight lost noted  - increase GLP1  - encourage continue exercise

## 2023-02-07 NOTE — ASSESSMENT & PLAN NOTE
- patient with longstanding history of low TSH with normal free T4,  since 2015  - idiopathic given negative TPO/TSI antibodies  - do not suspect hyperthyroidism given lack of symptoms.  With normal free T4. ? secondary dysfunction in setting of gamma knife  - rule out pituitary deficiency:  Normal hormonal workup as above  - no need for ATD medication given WNL FT4 and no symptoms  -  we will get thyroid ultrasound to evaluate for nodule, consider uptake scan   - otherwise repeat lab work every 6 mo

## 2023-02-07 NOTE — PROGRESS NOTES
Subjective:      Patient ID: Blas Alcocer is a 62 y.o. male presented to [facility] on 2/7/2023.  Chief Complaint:  Diabetes      History of Present Illness: Blas Alcocer is a 62 y.o. 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    Still with weight loss, did gain weight during the holiday months.  Now back on exercise regimen.  Tolerating Trulicity   Weight today in clinic 182 lb, previous office visit 07/2022: 188 lb, started Trulicity 200lb  No sign of palpitation, tremors.  No goiter      1) With regards to type 2 diabetes/prediabetes  - Diagnosed w/ DM:  prediabetes since 2019 with recent hyperglycemia on fasting blood work  - Patient is also interested in weight loss  - Does have have glucometer    - Current meds:  Trulicity 1.5mg once a week   - Weight trend: decrease noted  - Diabetes Education: No  - Diabetes Related Hospitalization:  No  - Hx of pancreatitis, hx of thyroid cancer: No  - Family history of diabetes: Yes  - Occupation: working    Eye exam current (within one year): yes, DR: no  Reports cuts or ulcers on feet:   Denies  Statin: Not taking, ACE/ARB: Taking    Diabetes lab work  Lab Results   Component Value Date    HGBA1C 5.4 01/11/2023    HGBA1C 5.5 07/11/2022    HGBA1C 5.7 (H) 12/17/2021    HGBA1C 5.5 06/17/2020     No results found for: CPEPTIDE, GLUTAMICACID, ISLETCELLANT   No results found for: FRUCTOSAMINE  Lab Results   Component Value Date    MICALBCREAT 27.7 01/11/2023     No results found for: [id number]    Diabetes Management Status: Reviewed this office visit  Screening or Prevention Patient's value Goal Complete/Controlled?   Lipid profile : 12/17/2021 Annually No     Dilated retinal exam : 03/24/2022 Annually Yes      Foot exam   Most Recent Foot Exam Date: Not Found Annually No          2) Subclinical hyperthyroidism   - Abnormal thyroid lab: Low TSH, normal free T4, subclinical hyperthyroidism?>> longstanding history prior to gamma knife  - First diagnosed: 2015?, results shows decrease in TSH from 8783-9566.  Possibly worsen  post Gamma Knife treatment 2018  - Hormonal workup done showed normal antibodies:  Negative TSI, TPO  - No other pituitary issues:  Recent blood work low suspicion for hypopituitarism  - No family hx   - monitor we have been monitoring lab work.  Never started patient on medication given stable free T4    - Patient denies any symptoms of hyperthyroidism including palpitation, tremors, weight loss  - Patient reports fatigue off and on otherwise does have issue with some weight gain. Patient denies headache, blurred vision  - No family history of thyroid disorder that he can recall  - Besides from Gamma Knife no other radiation exposure  - In the past had testosterone evaluated by PCP:  Normal, of note prior to gamma knife  - Did have 2 COVID infection asymptomatic per patient last 12/21  - Denies any use of:  Amiodarone/lithium  - No new medication.  Compliant with blood pressure medication amlodipine, lisinopril  - Herbal/Vitamin/Supplement:  Denies take Kelp, Iodine, Biotin, Selenium, Sea sarabia, Bladder wrack    Thyroid lab work  Lab Results   Component Value Date    TSH 0.344 (L) 01/11/2023    TSH 0.317 (L) 07/11/2022    TSH 0.295 (L) 04/12/2022    TSH 0.236 (L) 12/17/2021    FREET4 1.01 01/11/2023    FREET4 0.96 07/11/2022    FREET4 0.96 04/12/2022    FREET4 0.91 12/17/2021    D3NFIAB 106 04/12/2022    Z0FUIUS 114 07/07/2015      Antibodies  Lab Results   Component Value Date    THYROPEROXID <6.0 04/12/2022    THYROIDSTIMI <0.10 04/12/2022         Reviewed past surgical, medical, family, social history and updated as appropriate.  Review of Systems: see HPI above    Objective:   BP (!) 131/90 (BP  Location: Left arm)   Pulse 73   Temp 98.1 °F (36.7 °C) (Oral)   Wt 82.8 kg (182 lb 9.6 oz)   BMI 26.97 kg/m²     Body mass index is 26.97 kg/m².  Vital signs reviewed    Physical Exam  Vitals and nursing note reviewed.   Constitutional:       General: He is not in acute distress.     Appearance: Normal appearance. He is well-developed. He is obese. He is not toxic-appearing.   Neck:      Thyroid: No thyromegaly.      Comments: No goiter noted  Cardiovascular:      Heart sounds: Normal heart sounds.   Pulmonary:      Effort: Pulmonary effort is normal. No respiratory distress.   Abdominal:      Tenderness: There is no abdominal tenderness.   Musculoskeletal:         General: No deformity. Normal range of motion.      Cervical back: Normal range of motion.   Skin:     Findings: No bruising.   Neurological:      Mental Status: He is alert and oriented to person, place, and time.   Psychiatric:         Mood and Affect: Mood normal.       Lab Reviewed:   Lab Results   Component Value Date    HGBA1C 5.4 01/11/2023       Lab Results   Component Value Date    CHOL 206 (H) 12/17/2021    HDL 47 12/17/2021    LDLCALC 141.0 12/17/2021    TRIG 90 12/17/2021    CHOLHDL 22.8 12/17/2021       Lab Results   Component Value Date     01/11/2023    K 4.3 01/11/2023     01/11/2023    CO2 26 01/11/2023     (H) 01/11/2023    BUN 14 01/11/2023    CREATININE 0.9 01/11/2023    CALCIUM 8.8 01/11/2023    PROT 6.8 04/12/2022    ALBUMIN 4.0 04/12/2022    ALBUMIN 4.3 04/12/2022    BILITOT 1.0 04/12/2022    ALKPHOS 47 (L) 04/12/2022    AST 24 04/12/2022    ALT 29 04/12/2022    ANIONGAP 9 01/11/2023    ESTGFRAFRICA >60 07/11/2022    EGFRNONAA >60 07/11/2022    TSH 0.344 (L) 01/11/2023        Lab Results   Component Value Date    LUTAHTXE72SP 26 (L) 01/11/2023    LRVKRFQG59IW 23 (L) 01/05/2016    TNNGQNLW68UH 35 10/25/2013    CALCIUM 8.8 01/11/2023    CALCIUM 9.2 07/11/2022    CALCIUM 9.4 04/12/2022    ALKPHOS 47 (L)  04/12/2022    ALKPHOS 55 12/17/2021    ALKPHOS 52 (L) 06/17/2020    TSH 0.344 (L) 01/11/2023     Assessment     1. Type 2 diabetes mellitus with hyperglycemia, without long-term current use of insulin  dulaglutide (TRULICITY) 3 mg/0.5 mL pen injector    Basic Metabolic Panel    Hemoglobin A1C      2. Subclinical hyperthyroidism  US Soft Tissue Head Neck Thyroid    TSH    T4, Free      3. Class 1 obesity due to excess calories with serious comorbidity and body mass index (BMI) of 30.0 to 30.9 in adult        4. Hypovitaminosis D            Plan     Subclinical hyperthyroidism  - patient with longstanding history of low TSH with normal free T4,  since 2015  - idiopathic given negative TPO/TSI antibodies  - do not suspect hyperthyroidism given lack of symptoms.  With normal free T4. ? secondary dysfunction in setting of gamma knife  - rule out pituitary deficiency:  Normal hormonal workup as above  - no need for ATD medication given WNL FT4 and no symptoms  -  we will get thyroid ultrasound to evaluate for nodule, consider uptake scan   - otherwise repeat lab work every 6 mo    Type 2 diabetes mellitus with hyperglycemia, without long-term current use of insulin  - history of prediabetes for many years, now with worsening hyperglycemia weight gain in 2/2022  - treat as DM given longstand hx of prediabetes  - A1C is improving on therapy  - Increase Trulicity to 3.0mg once a week injection   - discussed with patient he is monitoring his diet  - Repeat lab work in 6 months for monitoring    Class 1 obesity due to excess calories with serious comorbidity and body mass index (BMI) of 30.0 to 30.9 in adult  - Body mass index is 26.97 kg/m².  - dietary discussion as above  - continue to monitor weight  - weight lost noted  - increase GLP1  - encourage continue exercise    Hypovitaminosis D  - lab work reviewed, low   - advised patient to start ergocalciferol 2000 IU daily   - yearly monitor      Advised patient to follow up  with PCP for routine health maintenance care.   RTC in 6 months      Mckinley Romero M.D.  Endocrinology  [facility]  2/7/2023      Disclaimer: This note has been generated in part with the use of voice-recognition software. There may be typographical errors that have been missed during proof-reading.

## 2023-02-08 ENCOUNTER — PATIENT MESSAGE (OUTPATIENT)
Dept: ENDOCRINOLOGY | Facility: CLINIC | Age: 63
End: 2023-02-08
Payer: COMMERCIAL

## 2023-03-08 ENCOUNTER — OFFICE VISIT (OUTPATIENT)
Dept: UROLOGY | Facility: CLINIC | Age: 63
End: 2023-03-08
Payer: COMMERCIAL

## 2023-03-08 VITALS — BODY MASS INDEX: 26.78 KG/M2 | WEIGHT: 181.31 LBS

## 2023-03-08 DIAGNOSIS — N40.0 BPH WITHOUT OBSTRUCTION/LOWER URINARY TRACT SYMPTOMS: ICD-10-CM

## 2023-03-08 DIAGNOSIS — R35.0 URINARY FREQUENCY: ICD-10-CM

## 2023-03-08 DIAGNOSIS — R97.20 ELEVATED PSA: Primary | ICD-10-CM

## 2023-03-08 LAB
BILIRUB SERPL-MCNC: NEGATIVE MG/DL
BLOOD URINE, POC: NORMAL
COLOR, POC UA: YELLOW
GLUCOSE UR QL STRIP: NORMAL
KETONES UR QL STRIP: NEGATIVE
LEUKOCYTE ESTERASE URINE, POC: NEGATIVE
NITRITE, POC UA: NEGATIVE
PH, POC UA: 5
PROTEIN, POC: NEGATIVE
SPECIFIC GRAVITY, POC UA: 1020
UROBILINOGEN, POC UA: NORMAL

## 2023-03-08 PROCEDURE — 99214 OFFICE O/P EST MOD 30 MIN: CPT | Mod: S$GLB,,, | Performed by: UROLOGY

## 2023-03-08 PROCEDURE — 3061F NEG MICROALBUMINURIA REV: CPT | Mod: CPTII,S$GLB,, | Performed by: UROLOGY

## 2023-03-08 PROCEDURE — 1159F PR MEDICATION LIST DOCUMENTED IN MEDICAL RECORD: ICD-10-PCS | Mod: CPTII,S$GLB,, | Performed by: UROLOGY

## 2023-03-08 PROCEDURE — 1159F MED LIST DOCD IN RCRD: CPT | Mod: CPTII,S$GLB,, | Performed by: UROLOGY

## 2023-03-08 PROCEDURE — 1160F RVW MEDS BY RX/DR IN RCRD: CPT | Mod: CPTII,S$GLB,, | Performed by: UROLOGY

## 2023-03-08 PROCEDURE — 3044F HG A1C LEVEL LT 7.0%: CPT | Mod: CPTII,S$GLB,, | Performed by: UROLOGY

## 2023-03-08 PROCEDURE — 3044F PR MOST RECENT HEMOGLOBIN A1C LEVEL <7.0%: ICD-10-PCS | Mod: CPTII,S$GLB,, | Performed by: UROLOGY

## 2023-03-08 PROCEDURE — 4010F ACE/ARB THERAPY RXD/TAKEN: CPT | Mod: CPTII,S$GLB,, | Performed by: UROLOGY

## 2023-03-08 PROCEDURE — 3066F PR DOCUMENTATION OF TREATMENT FOR NEPHROPATHY: ICD-10-PCS | Mod: CPTII,S$GLB,, | Performed by: UROLOGY

## 2023-03-08 PROCEDURE — 3061F PR NEG MICROALBUMINURIA RESULT DOCUMENTED/REVIEW: ICD-10-PCS | Mod: CPTII,S$GLB,, | Performed by: UROLOGY

## 2023-03-08 PROCEDURE — 99214 PR OFFICE/OUTPT VISIT, EST, LEVL IV, 30-39 MIN: ICD-10-PCS | Mod: S$GLB,,, | Performed by: UROLOGY

## 2023-03-08 PROCEDURE — 3066F NEPHROPATHY DOC TX: CPT | Mod: CPTII,S$GLB,, | Performed by: UROLOGY

## 2023-03-08 PROCEDURE — 99999 PR PBB SHADOW E&M-EST. PATIENT-LVL III: CPT | Mod: PBBFAC,,, | Performed by: UROLOGY

## 2023-03-08 PROCEDURE — 99999 PR PBB SHADOW E&M-EST. PATIENT-LVL III: ICD-10-PCS | Mod: PBBFAC,,, | Performed by: UROLOGY

## 2023-03-08 PROCEDURE — 3008F PR BODY MASS INDEX (BMI) DOCUMENTED: ICD-10-PCS | Mod: CPTII,S$GLB,, | Performed by: UROLOGY

## 2023-03-08 PROCEDURE — 1160F PR REVIEW ALL MEDS BY PRESCRIBER/CLIN PHARMACIST DOCUMENTED: ICD-10-PCS | Mod: CPTII,S$GLB,, | Performed by: UROLOGY

## 2023-03-08 PROCEDURE — 3008F BODY MASS INDEX DOCD: CPT | Mod: CPTII,S$GLB,, | Performed by: UROLOGY

## 2023-03-08 PROCEDURE — 4010F PR ACE/ARB THEARPY RXD/TAKEN: ICD-10-PCS | Mod: CPTII,S$GLB,, | Performed by: UROLOGY

## 2023-03-08 PROCEDURE — 81001 POCT URINALYSIS, DIPSTICK OR TABLET REAGENT, AUTOMATED, WITH MICROSCOP: ICD-10-PCS | Mod: S$GLB,,, | Performed by: UROLOGY

## 2023-03-08 PROCEDURE — 81001 URINALYSIS AUTO W/SCOPE: CPT | Mod: S$GLB,,, | Performed by: UROLOGY

## 2023-03-08 NOTE — PROGRESS NOTES
Subjective:       Patient ID: Blas Alcocer is a 62 y.o. male The patient's last visit with me was on 9/8/2022.       Chief Complaint:   Chief Complaint   Patient presents with    Follow-up         Elevated PSA    He underwent a prostate needle biopsy on 2/15/2022.  His biopsy was indicated due to: Elevated PSA.  Afterwards he experienced: Gross Hematuria and Blood in stool.  These symptoms have resolved.  His PSA prior to biopsy was 4.9.  His prostate size was 85 grams.  The ultrasound did not show a median lobe.  He currently does not have erectile dysfunction.  His pathology showed: benign disease.    9/8/2022  He is back with a new PSA.    03/08/2023  He is back with a new PSA.         ACTIVE MEDICAL ISSUES:  Patient Active Problem List   Diagnosis    Hypertriglyceridemia    Prediabetes    Hypovitaminosis D    Hypertension    Sixth nerve palsy of right eye    Brain mass    Wears glasses    Esotropia    Heel pain, bilateral    Abnormal thyroid blood test    Class 1 obesity due to excess calories with serious comorbidity and body mass index (BMI) of 30.0 to 30.9 in adult    Subclinical hyperthyroidism    Type 2 diabetes mellitus with hyperglycemia, without long-term current use of insulin       ALLERGIES AND MEDICATIONS: updated and reviewed.  Review of patient's allergies indicates:  No Known Allergies  Current Outpatient Medications   Medication Sig    amLODIPine (NORVASC) 10 MG tablet Take 1 tablet (10 mg total) by mouth once daily.    cholecalciferol, vitamin D3, (VITAMIN D3) 50 mcg (2,000 unit) Cap capsule Take by mouth once daily.    dulaglutide (TRULICITY) 3 mg/0.5 mL pen injector Inject 3 mg into the skin every 7 days.    fish oil-omega-3 fatty acids 300-1,000 mg capsule Take 2 g by mouth once daily.    GAVILYTE-G 236-22.74-6.74 -5.86 gram suspension Take 4,000 mLs by mouth once.    lisinopriL-hydrochlorothiazide (PRINZIDE,ZESTORETIC) 20-12.5 mg per tablet Take 1 tablet by mouth once daily.    saw  palmetto 80 MG capsule Take 80 mg by mouth once daily.     Current Facility-Administered Medications   Medication    gentamicin injection 80 mg       Review of Systems   Constitutional:  Negative for activity change, fatigue, fever and unexpected weight change.   HENT:  Negative for congestion.    Eyes:  Negative for redness.   Respiratory:  Negative for chest tightness and shortness of breath.    Cardiovascular:  Negative for chest pain and leg swelling.   Gastrointestinal:  Negative for abdominal pain, constipation, diarrhea, nausea and vomiting.   Genitourinary:  Negative for dysuria, flank pain, frequency, hematuria, penile pain, penile swelling, scrotal swelling, testicular pain and urgency.   Musculoskeletal:  Negative for arthralgias and back pain.   Neurological:  Negative for dizziness and light-headedness.   Psychiatric/Behavioral:  Negative for behavioral problems and confusion. The patient is not nervous/anxious.    All other systems reviewed and are negative.    Objective:      Vitals:    03/08/23 1408   Weight: 82.2 kg (181 lb 5.3 oz)         Physical Exam  Vitals and nursing note reviewed.   Constitutional:       Appearance: He is well-developed.   HENT:      Head: Normocephalic.   Eyes:      Conjunctiva/sclera: Conjunctivae normal.   Neck:      Thyroid: No thyromegaly.      Trachea: No tracheal deviation.   Cardiovascular:      Rate and Rhythm: Normal rate.      Heart sounds: Normal heart sounds.   Pulmonary:      Effort: Pulmonary effort is normal. No respiratory distress.      Breath sounds: Normal breath sounds. No wheezing.   Abdominal:      General: Bowel sounds are normal.      Palpations: Abdomen is soft.      Tenderness: There is no abdominal tenderness. There is no rebound.      Hernia: No hernia is present.   Musculoskeletal:         General: No tenderness. Normal range of motion.      Cervical back: Normal range of motion and neck supple.   Lymphadenopathy:      Cervical: No cervical  adenopathy.   Skin:     General: Skin is warm and dry.      Findings: No erythema or rash.   Neurological:      Mental Status: He is alert and oriented to person, place, and time.   Psychiatric:         Behavior: Behavior normal.         Thought Content: Thought content normal.         Judgment: Judgment normal.       Urine dipstick shows negative for all components.  Micro exam: negative for WBC's or RBC's.     Component Ref Range & Units 1 mo ago 6 mo ago 7 yr ago   PSA Diagnostic 0.00 - 4.00 ng/mL 5.5 High   5.1 High  CM  3.6 CM    Comment: The testing method is a chemiluminescent microparticle immunoassay   manufactured by [employer] and performed on the [employer]   or   [employer] system. Values obtained with different assay manufacturers   for   methods may be different and cannot be used interchangeably.   PSA Expected levels:   Hormonal Therapy: <0.05 ng/ml   Prostatectomy: <0.01 ng/ml   Radiation Therapy: <1.00 ng/ml    Resulting Agency  [facility]           Narrative  Performed by: OCOLY  6 months      Specimen Collected: 01/11/23 07:44 Last Resulted: 01/11/23 19:07               Assessment:       1. Elevated PSA    2. BPH without obstruction/lower urinary tract symptoms    3. Urinary frequency              Plan:       1. Elevated PSA    - MRI Prostate W W/O Contrast; Future    2. BPH without obstruction/lower urinary tract symptoms  Saw [place]    - POCT urinalysis, dipstick or tablet reag    3. Urinary frequency  stable             Follow up in about 6 weeks (around 4/19/2023) for Follow up Established, Review X-ray.

## 2023-03-31 ENCOUNTER — HOSPITAL ENCOUNTER (OUTPATIENT)
Dept: RADIOLOGY | Facility: HOSPITAL | Age: 63
Discharge: HOME | End: 2023-03-31
Attending: UROLOGY
Payer: COMMERCIAL

## 2023-03-31 DIAGNOSIS — R97.20 ELEVATED PSA: ICD-10-CM

## 2023-03-31 PROCEDURE — A9585 GADOBUTROL INJECTION: HCPCS | Performed by: UROLOGY

## 2023-03-31 PROCEDURE — 25500020 PHARM REV CODE 255: Performed by: UROLOGY

## 2023-03-31 PROCEDURE — 72197 MRI PROSTATE W W/O CONTRAST: ICD-10-PCS | Mod: 26,,, | Performed by: STUDENT IN AN ORGANIZED HEALTH CARE EDUCATION/TRAINING PROGRAM

## 2023-03-31 PROCEDURE — 72197 MRI PELVIS W/O & W/DYE: CPT | Mod: TC

## 2023-03-31 PROCEDURE — 72197 MRI PELVIS W/O & W/DYE: CPT | Mod: 26,,, | Performed by: STUDENT IN AN ORGANIZED HEALTH CARE EDUCATION/TRAINING PROGRAM

## 2023-03-31 RX ORDER — GADOBUTROL 604.72 MG/ML
9 INJECTION INTRAVENOUS
Status: COMPLETED | OUTPATIENT
Start: 2023-03-31 | End: 2023-03-31

## 2023-03-31 RX ADMIN — GADOBUTROL 9 ML: 604.72 INJECTION INTRAVENOUS at 04:03

## 2023-04-18 DIAGNOSIS — I10 PRIMARY HYPERTENSION: ICD-10-CM

## 2023-04-18 RX ORDER — AMLODIPINE BESYLATE 10 MG/1
10 TABLET ORAL DAILY
Qty: 7 TABLET | Refills: 0 | OUTPATIENT
Start: 2023-04-18 | End: 2024-04-17

## 2023-04-18 NOTE — TELEPHONE ENCOUNTER
Care Due:                  Date            Visit Type   Department     Provider  --------------------------------------------------------------------------------                                MYCHART                              ANNUAL       [facility] FAMILY                              CHECKUP/PHY  MEDICINE/INTERN  Last Visit: 07-      [facility] ULICES [place]  Next Visit: None Scheduled  None         None Found                                                            Last  Test          Frequency    Reason                     Performed    Due Date  --------------------------------------------------------------------------------    Office Visit  12 months..  amLODIPine,                07- 07-                             lisinopriL-hydrochlorothi                             azide....................    [employer] Embedded Care Gaps. Reference number: [id number]. 4/18/2023   9:20:48 AM CDT

## 2023-04-26 ENCOUNTER — OFFICE VISIT (OUTPATIENT)
Dept: UROLOGY | Facility: CLINIC | Age: 63
End: 2023-04-26
Payer: COMMERCIAL

## 2023-04-26 VITALS — BODY MASS INDEX: 26.61 KG/M2 | WEIGHT: 180.25 LBS

## 2023-04-26 DIAGNOSIS — R97.20 ELEVATED PSA: Primary | ICD-10-CM

## 2023-04-26 DIAGNOSIS — R35.0 URINARY FREQUENCY: ICD-10-CM

## 2023-04-26 DIAGNOSIS — N40.0 BPH WITHOUT OBSTRUCTION/LOWER URINARY TRACT SYMPTOMS: ICD-10-CM

## 2023-04-26 LAB
BILIRUB SERPL-MCNC: NEGATIVE MG/DL
BLOOD URINE, POC: NEGATIVE
COLOR, POC UA: YELLOW
GLUCOSE UR QL STRIP: NORMAL
KETONES UR QL STRIP: NEGATIVE
LEUKOCYTE ESTERASE URINE, POC: NEGATIVE
NITRITE, POC UA: NEGATIVE
PH, POC UA: 5
PROTEIN, POC: NEGATIVE
SPECIFIC GRAVITY, POC UA: 1020
UROBILINOGEN, POC UA: NORMAL

## 2023-04-26 PROCEDURE — 99999 PR PBB SHADOW E&M-EST. PATIENT-LVL III: CPT | Mod: PBBFAC,,, | Performed by: UROLOGY

## 2023-04-26 PROCEDURE — 3066F PR DOCUMENTATION OF TREATMENT FOR NEPHROPATHY: ICD-10-PCS | Mod: CPTII,S$GLB,, | Performed by: UROLOGY

## 2023-04-26 PROCEDURE — 3008F BODY MASS INDEX DOCD: CPT | Mod: CPTII,S$GLB,, | Performed by: UROLOGY

## 2023-04-26 PROCEDURE — 99999 PR PBB SHADOW E&M-EST. PATIENT-LVL III: ICD-10-PCS | Mod: PBBFAC,,, | Performed by: UROLOGY

## 2023-04-26 PROCEDURE — 1160F RVW MEDS BY RX/DR IN RCRD: CPT | Mod: CPTII,S$GLB,, | Performed by: UROLOGY

## 2023-04-26 PROCEDURE — 3061F NEG MICROALBUMINURIA REV: CPT | Mod: CPTII,S$GLB,, | Performed by: UROLOGY

## 2023-04-26 PROCEDURE — 1160F PR REVIEW ALL MEDS BY PRESCRIBER/CLIN PHARMACIST DOCUMENTED: ICD-10-PCS | Mod: CPTII,S$GLB,, | Performed by: UROLOGY

## 2023-04-26 PROCEDURE — 1159F PR MEDICATION LIST DOCUMENTED IN MEDICAL RECORD: ICD-10-PCS | Mod: CPTII,S$GLB,, | Performed by: UROLOGY

## 2023-04-26 PROCEDURE — 3066F NEPHROPATHY DOC TX: CPT | Mod: CPTII,S$GLB,, | Performed by: UROLOGY

## 2023-04-26 PROCEDURE — 3044F PR MOST RECENT HEMOGLOBIN A1C LEVEL <7.0%: ICD-10-PCS | Mod: CPTII,S$GLB,, | Performed by: UROLOGY

## 2023-04-26 PROCEDURE — 81001 POCT URINALYSIS, DIPSTICK OR TABLET REAGENT, AUTOMATED, WITH MICROSCOP: ICD-10-PCS | Mod: S$GLB,,, | Performed by: UROLOGY

## 2023-04-26 PROCEDURE — 4010F PR ACE/ARB THEARPY RXD/TAKEN: ICD-10-PCS | Mod: CPTII,S$GLB,, | Performed by: UROLOGY

## 2023-04-26 PROCEDURE — 1159F MED LIST DOCD IN RCRD: CPT | Mod: CPTII,S$GLB,, | Performed by: UROLOGY

## 2023-04-26 PROCEDURE — 3044F HG A1C LEVEL LT 7.0%: CPT | Mod: CPTII,S$GLB,, | Performed by: UROLOGY

## 2023-04-26 PROCEDURE — 99214 PR OFFICE/OUTPT VISIT, EST, LEVL IV, 30-39 MIN: ICD-10-PCS | Mod: S$GLB,,, | Performed by: UROLOGY

## 2023-04-26 PROCEDURE — 3061F PR NEG MICROALBUMINURIA RESULT DOCUMENTED/REVIEW: ICD-10-PCS | Mod: CPTII,S$GLB,, | Performed by: UROLOGY

## 2023-04-26 PROCEDURE — 81001 URINALYSIS AUTO W/SCOPE: CPT | Mod: S$GLB,,, | Performed by: UROLOGY

## 2023-04-26 PROCEDURE — 99214 OFFICE O/P EST MOD 30 MIN: CPT | Mod: S$GLB,,, | Performed by: UROLOGY

## 2023-04-26 PROCEDURE — 3008F PR BODY MASS INDEX (BMI) DOCUMENTED: ICD-10-PCS | Mod: CPTII,S$GLB,, | Performed by: UROLOGY

## 2023-04-26 PROCEDURE — 4010F ACE/ARB THERAPY RXD/TAKEN: CPT | Mod: CPTII,S$GLB,, | Performed by: UROLOGY

## 2023-04-26 NOTE — PROGRESS NOTES
Subjective:       Patient ID: Blas Alcocer is a 62 y.o. male The patient's last visit with me was on 3/8/2023.       Chief Complaint:   Chief Complaint   Patient presents with    Follow-up         Elevated PSA    He underwent a prostate needle biopsy on 2/15/2022.  His biopsy was indicated due to: Elevated PSA.  Afterwards he experienced: Gross Hematuria and Blood in stool.  These symptoms have resolved.  His PSA prior to biopsy was 4.9.  His prostate size was 85 grams.  The ultrasound did not show a median lobe.  He currently does not have erectile dysfunction.  His pathology showed: benign disease.    9/8/2022  He is back with a new PSA.    3/8/2023  He is back with a new PSA.    04/26/2023  He is back with a MRI.         ACTIVE MEDICAL ISSUES:  Patient Active Problem List   Diagnosis    Hypertriglyceridemia    Prediabetes    Hypovitaminosis D    Hypertension    Sixth nerve palsy of right eye    Brain mass    Wears glasses    Esotropia    Heel pain, bilateral    Abnormal thyroid blood test    Class 1 obesity due to excess calories with serious comorbidity and body mass index (BMI) of 30.0 to 30.9 in adult    Subclinical hyperthyroidism    Type 2 diabetes mellitus with hyperglycemia, without long-term current use of insulin       ALLERGIES AND MEDICATIONS: updated and reviewed.  Review of patient's allergies indicates:  No Known Allergies  Current Outpatient Medications   Medication Sig    amLODIPine (NORVASC) 10 MG tablet Take 1 tablet (10 mg total) by mouth once daily.    cholecalciferol, vitamin D3, (VITAMIN D3) 50 mcg (2,000 unit) Cap capsule Take by mouth once daily.    dulaglutide (TRULICITY) 3 mg/0.5 mL pen injector Inject 3 mg into the skin every 7 days.    fish oil-omega-3 fatty acids 300-1,000 mg capsule Take 2 g by mouth once daily.    GAVILYTE-G 236-22.74-6.74 -5.86 gram suspension Take 4,000 mLs by mouth once.    lisinopriL-hydrochlorothiazide (PRINZIDE,ZESTORETIC) 20-12.5 mg per tablet Take 1 tablet  by mouth once daily.    saw palmetto 80 MG capsule Take 80 mg by mouth once daily.     Current Facility-Administered Medications   Medication    gentamicin injection 80 mg       Review of Systems   Constitutional:  Negative for activity change, fatigue, fever and unexpected weight change.   HENT:  Negative for congestion.    Eyes:  Negative for redness.   Respiratory:  Negative for chest tightness and shortness of breath.    Cardiovascular:  Negative for chest pain and leg swelling.   Gastrointestinal:  Negative for abdominal pain, constipation, diarrhea, nausea and vomiting.   Genitourinary:  Negative for dysuria, flank pain, frequency, hematuria, penile pain, penile swelling, scrotal swelling, testicular pain and urgency.   Musculoskeletal:  Negative for arthralgias and back pain.   Neurological:  Negative for dizziness and light-headedness.   Psychiatric/Behavioral:  Negative for behavioral problems and confusion. The patient is not nervous/anxious.    All other systems reviewed and are negative.    Objective:      Vitals:    04/26/23 1552   Weight: 81.8 kg (180 lb 3.6 oz)         Physical Exam  Vitals and nursing note reviewed.   Constitutional:       Appearance: He is well-developed.   HENT:      Head: Normocephalic.   Eyes:      Conjunctiva/sclera: Conjunctivae normal.   Neck:      Thyroid: No thyromegaly.      Trachea: No tracheal deviation.   Cardiovascular:      Rate and Rhythm: Normal rate.      Heart sounds: Normal heart sounds.   Pulmonary:      Effort: Pulmonary effort is normal. No respiratory distress.      Breath sounds: Normal breath sounds. No wheezing.   Abdominal:      General: Bowel sounds are normal.      Palpations: Abdomen is soft.      Tenderness: There is no abdominal tenderness. There is no rebound.      Hernia: No hernia is present.   Musculoskeletal:         General: No tenderness. Normal range of motion.      Cervical back: Normal range of motion and neck supple.   Lymphadenopathy:       Cervical: No cervical adenopathy.   Skin:     General: Skin is warm and dry.      Findings: No erythema or rash.   Neurological:      Mental Status: He is alert and oriented to person, place, and time.   Psychiatric:         Behavior: Behavior normal.         Thought Content: Thought content normal.         Judgment: Judgment normal.       Urine dipstick shows negative for all components.  Micro exam: negative for WBC's or RBC's.     Component Ref Range & Units 1 mo ago 6 mo ago 7 yr ago   PSA Diagnostic 0.00 - 4.00 ng/mL 5.5 High   5.1 High  CM  3.6 CM    Comment: The testing method is a chemiluminescent microparticle immunoassay   manufactured by [employer] and performed on the [employer]   or   [employer] system. Values obtained with different assay manufacturers   for   methods may be different and cannot be used interchangeably.   PSA Expected levels:   Hormonal Therapy: <0.05 ng/ml   Prostatectomy: <0.01 ng/ml   Radiation Therapy: <1.00 ng/ml    Resulting Agency  [facility]           Narrative  Performed by: [facility]  6 months      Specimen Collected: 01/11/23 07:44 Last Resulted: 01/11/23 19:07           MRI Prostate W W/O Contrast  Order: [id number]  Status: Final result     Visible to patient: Yes (seen)     Next appt: 08/01/2023 at 08:00 AM in Lab (LAB, [facility])     Dx: Elevated PSA     1 Result Note    1 Patient Communication  Details    Reading Physician Reading Date Result Priority   Arthur Fontanez MD  [phone] 4/2/2023 Routine     Narrative & Impression  EXAMINATION:  MRI PROSTATE W W/O CONTRAST     CLINICAL HISTORY:  Prostate cancer suspected;  Elevated prostate specific antigen (PSA)     Additional history: None provided.     TECHNIQUE:  Multiparametric MRI of the prostate/pelvis performed on a 3T scanner with phase pelvic coil. Multiplanar, multisequence images including high resolution, small field-of-view T2-WI; axial diffusion weighted images with multiple B-values and  creation of ADC-maps; and dynamic contrast enhanced T1-weighted images through the prostate were obtained before, during, and after the administration of 10 cc intravenous gadolinium.     COMPARISON:  None.     FINDINGS:  Previous biopsy: Negative biopsy 02/15/2022     PSA: 5.5 ng/mL 01/11/2023     Prior therapy: None     Prostate: 6.1 x 4.7 x 5.8 cm corresponding to a computed volume of 87 cc.     Peripheral zone: Several linear regions of T2 signal hypointensity; score 2.     Transitional zone: Benign prostatic hyperplasia without focal suspicious abnormality, score 2.     Neurovascular bundle: Normal appearance.     Seminal vesicles: Normal appearance.     Adjacent Organ Involvement: No evidence for urinary bladder or rectal invasion.     Lymphadenopathy: None.     Other Findings: None.     Impression:     1. No suspicious focal prostate lesion.  2. BPH.  Overall Assessment: PI-RADS 2 - Low (clinically significant cancer is unlikely to be present)     Number of targets created for potential MR/US fusion biopsy     Peripheral zone: 0     Transition zone: 0        Electronically signed by: Arthru Fontanez  Date:                                            04/02/2023  Time:                                           07:40       Assessment:       1. Elevated PSA    2. BPH without obstruction/lower urinary tract symptoms    3. Urinary frequency                Plan:       1. Elevated PSA  PSAD 0.632    - PSA, Total and Free; Future    2. BPH without obstruction/lower urinary tract symptoms  Saw palmetto  - POCT urinalysis, dipstick or tablet reag    3. Urinary frequency  stable             Follow up in about 1 year (around 4/26/2024) for Follow up Established, Review PSA.

## 2023-05-03 DIAGNOSIS — I10 ESSENTIAL HYPERTENSION: ICD-10-CM

## 2023-05-03 RX ORDER — LISINOPRIL AND HYDROCHLOROTHIAZIDE 12.5; 2 MG/1; MG/1
1 TABLET ORAL DAILY
Qty: 90 TABLET | Refills: 3 | Status: SHIPPED | OUTPATIENT
Start: 2023-05-03 | End: 2023-08-09 | Stop reason: SDUPTHER

## 2023-05-03 NOTE — TELEPHONE ENCOUNTER
No care due was identified.  [employer] Embedded Care Due Messages. Reference number: [id number].   5/03/2023 11:00:51 AM CDT

## 2023-06-15 ENCOUNTER — PATIENT MESSAGE (OUTPATIENT)
Dept: FAMILY MEDICINE | Facility: CLINIC | Age: 63
End: 2023-06-15
Payer: COMMERCIAL

## 2023-06-22 ENCOUNTER — PATIENT MESSAGE (OUTPATIENT)
Dept: FAMILY MEDICINE | Facility: CLINIC | Age: 63
End: 2023-06-22
Payer: COMMERCIAL

## 2023-08-01 ENCOUNTER — LAB VISIT (OUTPATIENT)
Dept: LAB | Facility: HOSPITAL | Age: 63
End: 2023-08-01
Attending: HOSPITALIST
Payer: COMMERCIAL

## 2023-08-01 DIAGNOSIS — E11.65 TYPE 2 DIABETES MELLITUS WITH HYPERGLYCEMIA, WITHOUT LONG-TERM CURRENT USE OF INSULIN: ICD-10-CM

## 2023-08-01 DIAGNOSIS — E05.90 SUBCLINICAL HYPERTHYROIDISM: ICD-10-CM

## 2023-08-01 LAB
ANION GAP SERPL CALC-SCNC: 8 MMOL/L (ref 8–16)
BUN SERPL-MCNC: 12 MG/DL (ref 8–23)
CALCIUM SERPL-MCNC: 9.6 MG/DL (ref 8.7–10.5)
CHLORIDE SERPL-SCNC: 107 MMOL/L (ref 95–110)
CO2 SERPL-SCNC: 24 MMOL/L (ref 23–29)
CREAT SERPL-MCNC: 1 MG/DL (ref 0.5–1.4)
EST. GFR  (NO RACE VARIABLE): >60 ML/MIN/1.73 M^2
ESTIMATED AVG GLUCOSE: 105 MG/DL (ref 68–131)
GLUCOSE SERPL-MCNC: 111 MG/DL (ref 70–110)
HBA1C MFR BLD: 5.3 % (ref 4–5.6)
POTASSIUM SERPL-SCNC: 3.8 MMOL/L (ref 3.5–5.1)
SODIUM SERPL-SCNC: 139 MMOL/L (ref 136–145)
T4 FREE SERPL-MCNC: 1 NG/DL (ref 0.71–1.51)
TSH SERPL DL<=0.005 MIU/L-ACNC: 0.52 UIU/ML (ref 0.4–4)

## 2023-08-01 PROCEDURE — 36415 COLL VENOUS BLD VENIPUNCTURE: CPT | Mod: PO | Performed by: HOSPITALIST

## 2023-08-01 PROCEDURE — 80048 BASIC METABOLIC PNL TOTAL CA: CPT | Performed by: HOSPITALIST

## 2023-08-01 PROCEDURE — 84439 ASSAY OF FREE THYROXINE: CPT | Performed by: HOSPITALIST

## 2023-08-01 PROCEDURE — 83036 HEMOGLOBIN GLYCOSYLATED A1C: CPT | Performed by: HOSPITALIST

## 2023-08-01 PROCEDURE — 84443 ASSAY THYROID STIM HORMONE: CPT | Performed by: HOSPITALIST

## 2023-08-15 ENCOUNTER — OFFICE VISIT (OUTPATIENT)
Dept: FAMILY MEDICINE | Facility: CLINIC | Age: 63
End: 2023-08-15
Payer: COMMERCIAL

## 2023-08-15 VITALS
WEIGHT: 178.56 LBS | HEIGHT: 67 IN | OXYGEN SATURATION: 97 % | BODY MASS INDEX: 28.02 KG/M2 | DIASTOLIC BLOOD PRESSURE: 72 MMHG | HEART RATE: 76 BPM | SYSTOLIC BLOOD PRESSURE: 110 MMHG | TEMPERATURE: 98 F

## 2023-08-15 DIAGNOSIS — I10 ESSENTIAL HYPERTENSION: ICD-10-CM

## 2023-08-15 DIAGNOSIS — Z00.00 ANNUAL PHYSICAL EXAM: Primary | ICD-10-CM

## 2023-08-15 PROCEDURE — 3078F PR MOST RECENT DIASTOLIC BLOOD PRESSURE < 80 MM HG: ICD-10-PCS | Mod: CPTII,S$GLB,, | Performed by: FAMILY MEDICINE

## 2023-08-15 PROCEDURE — 3066F PR DOCUMENTATION OF TREATMENT FOR NEPHROPATHY: ICD-10-PCS | Mod: CPTII,S$GLB,, | Performed by: FAMILY MEDICINE

## 2023-08-15 PROCEDURE — 1159F PR MEDICATION LIST DOCUMENTED IN MEDICAL RECORD: ICD-10-PCS | Mod: CPTII,S$GLB,, | Performed by: FAMILY MEDICINE

## 2023-08-15 PROCEDURE — 3061F NEG MICROALBUMINURIA REV: CPT | Mod: CPTII,S$GLB,, | Performed by: FAMILY MEDICINE

## 2023-08-15 PROCEDURE — 3008F BODY MASS INDEX DOCD: CPT | Mod: CPTII,S$GLB,, | Performed by: FAMILY MEDICINE

## 2023-08-15 PROCEDURE — 99396 PREV VISIT EST AGE 40-64: CPT | Mod: S$GLB,,, | Performed by: FAMILY MEDICINE

## 2023-08-15 PROCEDURE — 3074F SYST BP LT 130 MM HG: CPT | Mod: CPTII,S$GLB,, | Performed by: FAMILY MEDICINE

## 2023-08-15 PROCEDURE — 3078F DIAST BP <80 MM HG: CPT | Mod: CPTII,S$GLB,, | Performed by: FAMILY MEDICINE

## 2023-08-15 PROCEDURE — 99999 PR PBB SHADOW E&M-EST. PATIENT-LVL III: CPT | Mod: PBBFAC,,, | Performed by: FAMILY MEDICINE

## 2023-08-15 PROCEDURE — 1159F MED LIST DOCD IN RCRD: CPT | Mod: CPTII,S$GLB,, | Performed by: FAMILY MEDICINE

## 2023-08-15 PROCEDURE — 3044F HG A1C LEVEL LT 7.0%: CPT | Mod: CPTII,S$GLB,, | Performed by: FAMILY MEDICINE

## 2023-08-15 PROCEDURE — 3061F PR NEG MICROALBUMINURIA RESULT DOCUMENTED/REVIEW: ICD-10-PCS | Mod: CPTII,S$GLB,, | Performed by: FAMILY MEDICINE

## 2023-08-15 PROCEDURE — 99999 PR PBB SHADOW E&M-EST. PATIENT-LVL III: ICD-10-PCS | Mod: PBBFAC,,, | Performed by: FAMILY MEDICINE

## 2023-08-15 PROCEDURE — 4010F ACE/ARB THERAPY RXD/TAKEN: CPT | Mod: CPTII,S$GLB,, | Performed by: FAMILY MEDICINE

## 2023-08-15 PROCEDURE — 3074F PR MOST RECENT SYSTOLIC BLOOD PRESSURE < 130 MM HG: ICD-10-PCS | Mod: CPTII,S$GLB,, | Performed by: FAMILY MEDICINE

## 2023-08-15 PROCEDURE — 3044F PR MOST RECENT HEMOGLOBIN A1C LEVEL <7.0%: ICD-10-PCS | Mod: CPTII,S$GLB,, | Performed by: FAMILY MEDICINE

## 2023-08-15 PROCEDURE — 3066F NEPHROPATHY DOC TX: CPT | Mod: CPTII,S$GLB,, | Performed by: FAMILY MEDICINE

## 2023-08-15 PROCEDURE — 4010F PR ACE/ARB THEARPY RXD/TAKEN: ICD-10-PCS | Mod: CPTII,S$GLB,, | Performed by: FAMILY MEDICINE

## 2023-08-15 PROCEDURE — 99396 PR PREVENTIVE VISIT,EST,40-64: ICD-10-PCS | Mod: S$GLB,,, | Performed by: FAMILY MEDICINE

## 2023-08-15 PROCEDURE — 3008F PR BODY MASS INDEX (BMI) DOCUMENTED: ICD-10-PCS | Mod: CPTII,S$GLB,, | Performed by: FAMILY MEDICINE

## 2023-08-15 RX ORDER — LISINOPRIL AND HYDROCHLOROTHIAZIDE 12.5; 2 MG/1; MG/1
1 TABLET ORAL DAILY
Qty: 90 TABLET | Refills: 3 | Status: SHIPPED | OUTPATIENT
Start: 2023-08-15 | End: 2023-08-15 | Stop reason: SDUPTHER

## 2023-08-15 NOTE — PROGRESS NOTES
Chief Complaint   Patient presents with    Annual Exam       SUBJECTIVE:   Blas Alcocer is a 63 y.o. male presenting for his annual checkup.   Current Outpatient Medications   Medication Sig Dispense Refill    amLODIPine (NORVASC) 10 MG tablet Take 1 tablet (10 mg total) by mouth once daily. 90 tablet 3    cholecalciferol, vitamin D3, (VITAMIN D3) 50 mcg (2,000 unit) Cap capsule Take by mouth once daily.      dulaglutide (TRULICITY) 3 mg/0.5 mL pen injector Inject 3 mg into the skin every 7 days. 12 pen 3    fish oil-omega-3 fatty acids 300-1,000 mg capsule Take 2 g by mouth once daily.      GAVILYTE-G 236-22.74-6.74 -5.86 gram suspension Take 4,000 mLs by mouth once.      lisinopriL-hydrochlorothiazide (PRINZIDE,ZESTORETIC) 20-12.5 mg per tablet Take 1 tablet by mouth once daily. 90 tablet 3    saw palmetto 80 MG capsule Take 80 mg by mouth once daily.       Current Facility-Administered Medications   Medication Dose Route Frequency Provider Last Rate Last Admin    gentamicin injection 80 mg  80 mg Intramuscular 1 time in Clinic/HOD Mayur Mckeon MD         Allergies: Patient has no known allergies.   Patient Active Problem List    Diagnosis Date Noted    Subclinical hyperthyroidism 07/18/2022    Type 2 diabetes mellitus with hyperglycemia, without long-term current use of insulin 07/18/2022    Abnormal thyroid blood test 04/18/2022    Class 1 obesity due to excess calories with serious comorbidity and body mass index (BMI) of 30.0 to 30.9 in adult 04/18/2022    Heel pain, bilateral 04/25/2019    Esotropia 02/14/2019    Wears glasses 07/07/2017    Brain mass 01/04/2017    Sixth nerve palsy of right eye 12/06/2016    Hypertension 08/19/2014    Hypertriglyceridemia     Prediabetes     Hypovitaminosis D        ROS:  Feeling well. No dyspnea or chest pain on exertion. No abdominal pain, change in bowel habits, black or bloody stools. No urinary tract or prostatic symptoms. No neurological  "complaints.    OBJECTIVE:   The patient appears well, alert, oriented x 3, in no distress.   /72   Pulse 76   Temp 98.1 °F (36.7 °C) (Oral)   Ht 5' 7" (1.702 m)   Wt 81 kg (178 lb 9.2 oz)   SpO2 97%   BMI 27.97 kg/m²   Wt Readings from Last 5 Encounters:   08/15/23 81 kg (178 lb 9.2 oz)   04/26/23 81.8 kg (180 lb 3.6 oz)   03/08/23 82.2 kg (181 lb 5.3 oz)   02/07/23 82.8 kg (182 lb 9.6 oz)   09/08/22 84.5 kg (186 lb 2.9 oz)       ENT normal.  Neck supple. No adenopathy or thyromegaly. KEVIN. Lungs are clear, good air entry, no wheezes, rhonchi or rales. S1 and S2 normal, no murmurs, regular rate and rhythm. Abdomen is soft without tenderness, guarding, mass or organomegaly.  exam: deferred.  Extremities show no edema, normal peripheral pulses. Neurological is normal without focal findings.    ASSESSMENT:   1. Annual physical exam    2. Essential hypertension          PLAN:   Counseled on age appropriate medical preventative services, including age appropriate cancer screenings, over all nutritional health, need for a consistent exercise regimen and an over all push towards maintaining a vigorous and active lifestyle.  Counseled on age appropriate vaccines and discussed upcoming health care needs based on age/gender.  Spent time with patient counseling on need for a good patient/doctor relationship moving forward.  Discussed use of common OTC medications and supplements.  Discussed common dietary aids and use of caffeine and the need for good sleep hygiene and stress management.    Problem List Items Addressed This Visit    None  Visit Diagnoses       Annual physical exam    -  Primary    Essential hypertension        Relevant Medications    lisinopriL-hydrochlorothiazide (PRINZIDE,ZESTORETIC) 20-12.5 mg per tablet              "

## 2023-08-18 ENCOUNTER — OFFICE VISIT (OUTPATIENT)
Dept: ENDOCRINOLOGY | Facility: CLINIC | Age: 63
End: 2023-08-18
Payer: COMMERCIAL

## 2023-08-18 ENCOUNTER — PATIENT MESSAGE (OUTPATIENT)
Dept: ENDOCRINOLOGY | Facility: CLINIC | Age: 63
End: 2023-08-18

## 2023-08-18 VITALS
DIASTOLIC BLOOD PRESSURE: 83 MMHG | SYSTOLIC BLOOD PRESSURE: 132 MMHG | TEMPERATURE: 98 F | HEART RATE: 73 BPM | BODY MASS INDEX: 28.47 KG/M2 | WEIGHT: 181.81 LBS

## 2023-08-18 DIAGNOSIS — E05.90 SUBCLINICAL HYPERTHYROIDISM: ICD-10-CM

## 2023-08-18 DIAGNOSIS — E11.65 TYPE 2 DIABETES MELLITUS WITH HYPERGLYCEMIA, WITHOUT LONG-TERM CURRENT USE OF INSULIN: Primary | ICD-10-CM

## 2023-08-18 DIAGNOSIS — E66.09 CLASS 1 OBESITY DUE TO EXCESS CALORIES WITH SERIOUS COMORBIDITY AND BODY MASS INDEX (BMI) OF 30.0 TO 30.9 IN ADULT: ICD-10-CM

## 2023-08-18 PROCEDURE — 1159F PR MEDICATION LIST DOCUMENTED IN MEDICAL RECORD: ICD-10-PCS | Mod: CPTII,S$GLB,, | Performed by: HOSPITALIST

## 2023-08-18 PROCEDURE — 3079F DIAST BP 80-89 MM HG: CPT | Mod: CPTII,S$GLB,, | Performed by: HOSPITALIST

## 2023-08-18 PROCEDURE — 3044F PR MOST RECENT HEMOGLOBIN A1C LEVEL <7.0%: ICD-10-PCS | Mod: CPTII,S$GLB,, | Performed by: HOSPITALIST

## 2023-08-18 PROCEDURE — 3075F SYST BP GE 130 - 139MM HG: CPT | Mod: CPTII,S$GLB,, | Performed by: HOSPITALIST

## 2023-08-18 PROCEDURE — 3066F NEPHROPATHY DOC TX: CPT | Mod: CPTII,S$GLB,, | Performed by: HOSPITALIST

## 2023-08-18 PROCEDURE — 3008F PR BODY MASS INDEX (BMI) DOCUMENTED: ICD-10-PCS | Mod: CPTII,S$GLB,, | Performed by: HOSPITALIST

## 2023-08-18 PROCEDURE — 99999 PR PBB SHADOW E&M-EST. PATIENT-LVL III: CPT | Mod: PBBFAC,,, | Performed by: HOSPITALIST

## 2023-08-18 PROCEDURE — 99214 PR OFFICE/OUTPT VISIT, EST, LEVL IV, 30-39 MIN: ICD-10-PCS | Mod: S$GLB,,, | Performed by: HOSPITALIST

## 2023-08-18 PROCEDURE — 3066F PR DOCUMENTATION OF TREATMENT FOR NEPHROPATHY: ICD-10-PCS | Mod: CPTII,S$GLB,, | Performed by: HOSPITALIST

## 2023-08-18 PROCEDURE — 3008F BODY MASS INDEX DOCD: CPT | Mod: CPTII,S$GLB,, | Performed by: HOSPITALIST

## 2023-08-18 PROCEDURE — 1160F PR REVIEW ALL MEDS BY PRESCRIBER/CLIN PHARMACIST DOCUMENTED: ICD-10-PCS | Mod: CPTII,S$GLB,, | Performed by: HOSPITALIST

## 2023-08-18 PROCEDURE — 4010F PR ACE/ARB THEARPY RXD/TAKEN: ICD-10-PCS | Mod: CPTII,S$GLB,, | Performed by: HOSPITALIST

## 2023-08-18 PROCEDURE — 99214 OFFICE O/P EST MOD 30 MIN: CPT | Mod: S$GLB,,, | Performed by: HOSPITALIST

## 2023-08-18 PROCEDURE — 3061F PR NEG MICROALBUMINURIA RESULT DOCUMENTED/REVIEW: ICD-10-PCS | Mod: CPTII,S$GLB,, | Performed by: HOSPITALIST

## 2023-08-18 PROCEDURE — 1159F MED LIST DOCD IN RCRD: CPT | Mod: CPTII,S$GLB,, | Performed by: HOSPITALIST

## 2023-08-18 PROCEDURE — 1160F RVW MEDS BY RX/DR IN RCRD: CPT | Mod: CPTII,S$GLB,, | Performed by: HOSPITALIST

## 2023-08-18 PROCEDURE — 3044F HG A1C LEVEL LT 7.0%: CPT | Mod: CPTII,S$GLB,, | Performed by: HOSPITALIST

## 2023-08-18 PROCEDURE — 99999 PR PBB SHADOW E&M-EST. PATIENT-LVL III: ICD-10-PCS | Mod: PBBFAC,,, | Performed by: HOSPITALIST

## 2023-08-18 PROCEDURE — 3079F PR MOST RECENT DIASTOLIC BLOOD PRESSURE 80-89 MM HG: ICD-10-PCS | Mod: CPTII,S$GLB,, | Performed by: HOSPITALIST

## 2023-08-18 PROCEDURE — 3075F PR MOST RECENT SYSTOLIC BLOOD PRESS GE 130-139MM HG: ICD-10-PCS | Mod: CPTII,S$GLB,, | Performed by: HOSPITALIST

## 2023-08-18 PROCEDURE — 3061F NEG MICROALBUMINURIA REV: CPT | Mod: CPTII,S$GLB,, | Performed by: HOSPITALIST

## 2023-08-18 PROCEDURE — 4010F ACE/ARB THERAPY RXD/TAKEN: CPT | Mod: CPTII,S$GLB,, | Performed by: HOSPITALIST

## 2023-08-18 RX ORDER — DULAGLUTIDE 3 MG/.5ML
3 INJECTION, SOLUTION SUBCUTANEOUS
Qty: 12 PEN | Refills: 3 | Status: SHIPPED | OUTPATIENT
Start: 2023-08-18 | End: 2024-02-19 | Stop reason: SDUPTHER

## 2023-08-18 NOTE — ASSESSMENT & PLAN NOTE
- patient with longstanding history of low TSH with normal free T4,  since 2015  - idiopathic given negative TPO/TSI antibodies  - do not suspect hyperthyroidism given lack of symptoms.  With normal free T4. ? secondary dysfunction in setting of gamma knife  - rule out pituitary deficiency:  Normal hormonal workup as above  - no need for ATD medication given WNL FT4 and no symptoms  - otherwise repeat lab work every 6 mo

## 2023-08-18 NOTE — ASSESSMENT & PLAN NOTE
- Body mass index is 28.47 kg/m².  - dietary discussion as above  - continue to monitor weight  - weight lost noted  - increase GLP1  - encourage continue exercise

## 2023-08-18 NOTE — ASSESSMENT & PLAN NOTE
- history of prediabetes for many years, now with worsening hyperglycemia weight gain in 2/2022  - treat as DM given longstand hx of prediabetes, insulin resistant hyperglycemia  - A1C is improving on therapy  - Increase Trulicity to 3.0mg once a week injection >> sent to pharmacy  - discussed with patient he is monitoring his diet  - Repeat lab work in 6 months for monitoring

## 2023-08-18 NOTE — PROGRESS NOTES
"Subjective:      Patient ID: Blas Alcocer is a 63 y.o. male presented to [facility] on 8/18/2023.  Chief Complaint:  Diabetes    History of Present Illness: Blas Alcocer is a 63 y.o. 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    Still with weight loss, Now back on exercise regimen.  Tolerating Trulicity unclear why patient did not get Trulicity 3.0 mg once a week, that was sent at the last office visit.  Weight today in clinic 181<< , previous office visit 07/2022: 182 lb, 188 lb, started Trulicity 200lb  No sign of palpitation, tremors.  No goiter    1) With regards to type 2 diabetes/prediabetes  - Diagnosed w/ DM:  prediabetes since 2019 with recent hyperglycemia on fasting blood work  - Patient is also interested in weight loss  - Does have have glucometer    - Current meds:  Trulicity 1.5mg once a week >> previously attempted to get 3 mg unclear why was not sent by pharmacy  - Weight trend: decrease noted  - Diabetes Education: No  - Diabetes Related Hospitalization:  No  - Hx of pancreatitis, hx of thyroid cancer: No  - Family history of diabetes: Yes  - Occupation: working    Eye exam current (within one year): yes, DR: no  Reports cuts or ulcers on feet:   Denies  Statin: Not taking, ACE/ARB: Taking    Diabetes lab work  Lab Results   Component Value Date    HGBA1C 5.3 08/01/2023    HGBA1C 5.4 01/11/2023    HGBA1C 5.5 07/11/2022    HGBA1C 5.7 (H) 12/17/2021     No results found for: "CPEPTIDE", "GLUTAMICACID", "ISLETCELLANT"   No results found for: "FRUCTOSAMINE"  Lab Results   Component Value Date    MICALBCREAT 27.7 01/11/2023     No results found for: "PEKYJNEI26"    Diabetes Management Status: Reviewed this office visit  Screening or " Prevention Patient's value Goal Complete/Controlled?   Lipid profile : 12/17/2021 Annually No     Dilated retinal exam : 03/24/2022 Annually Yes     Foot exam   Most Recent Foot Exam Date: Not Found Annually No          2) Subclinical hyperthyroidism   - Abnormal thyroid lab: Low TSH, normal free T4, subclinical hyperthyroidism?>> longstanding history prior to gamma knife  - First diagnosed: 2015?, results shows decrease in TSH from 5600-9670.  Possibly worsen  post Gamma Knife treatment 2018  - Hormonal workup done showed normal antibodies:  Negative TSI, TPO  - No other pituitary issues:  Recent blood work low suspicion for hypopituitarism  - No family hx   - monitor we have been monitoring lab work.  Never started patient on medication given stable free T4    - Patient denies any symptoms of hyperthyroidism including palpitation, tremors, weight loss  - Patient reports fatigue off and on otherwise does have issue with some weight gain. Patient denies headache, blurred vision  - No family history of thyroid disorder that he can recall  - Besides from Gamma Knife no other radiation exposure  - In the past had testosterone evaluated by PCP:  Normal, of note prior to gamma knife  - Did have 2 COVID infection asymptomatic per patient last 12/21  - Denies any use of:  Amiodarone/lithium  - No new medication.  Compliant with blood pressure medication amlodipine, lisinopril  - Herbal/Vitamin/Supplement:  Denies take Kelp, Iodine, Biotin, Selenium, Sea sarabia, Bladder wrack    Thyroid lab work  Lab Results   Component Value Date    TSH 0.515 08/01/2023    TSH 0.344 (L) 01/11/2023    TSH 0.317 (L) 07/11/2022    TSH 0.295 (L) 04/12/2022    FREET4 1.00 08/01/2023    FREET4 1.01 01/11/2023    FREET4 0.96 07/11/2022    FREET4 0.96 04/12/2022    X6RGQJF 106 04/12/2022    D3MCFIQ 114 07/07/2015      Antibodies  Lab Results   Component Value Date    THYROPEROXID <6.0 04/12/2022    THYROIDSTIMI <0.10 04/12/2022         Reviewed past  surgical, medical, family, social history and updated as appropriate.  Review of Systems: see HPI above    Objective:   /83   Pulse 73   Temp 97.9 °F (36.6 °C) (Oral)   Wt 82.5 kg (181 lb 12.8 oz)   BMI 28.47 kg/m²     Body mass index is 28.47 kg/m².  Vital signs reviewed    Physical Exam  Vitals and nursing note reviewed.   Constitutional:       General: He is not in acute distress.     Appearance: Normal appearance. He is well-developed. He is obese. He is not toxic-appearing.   Neck:      Thyroid: No thyromegaly.      Comments: No goiter noted  Cardiovascular:      Heart sounds: Normal heart sounds.   Pulmonary:      Effort: Pulmonary effort is normal. No respiratory distress.   Abdominal:      Tenderness: There is no abdominal tenderness.   Musculoskeletal:         General: No deformity. Normal range of motion.      Cervical back: Normal range of motion.   Skin:     Findings: No bruising.   Neurological:      Mental Status: He is alert and oriented to person, place, and time.   Psychiatric:         Mood and Affect: Mood normal.       Lab Reviewed:  See results in subjective  Lab Results   Component Value Date    HGBA1C 5.3 08/01/2023     Lab Results   Component Value Date    CHOL 206 (H) 12/17/2021    HDL 47 12/17/2021    LDLCALC 141.0 12/17/2021    TRIG 90 12/17/2021    CHOLHDL 22.8 12/17/2021     Lab Results   Component Value Date     08/01/2023    K 3.8 08/01/2023     08/01/2023    CO2 24 08/01/2023     (H) 08/01/2023    BUN 12 08/01/2023    CREATININE 1.0 08/01/2023    CALCIUM 9.6 08/01/2023    PROT 6.8 04/12/2022    ALBUMIN 4.0 04/12/2022    ALBUMIN 4.3 04/12/2022    BILITOT 1.0 04/12/2022    ALKPHOS 47 (L) 04/12/2022    AST 24 04/12/2022    ALT 29 04/12/2022    ANIONGAP 8 08/01/2023    ESTGFRAFRICA >60 07/11/2022    EGFRNONAA >60 07/11/2022    TSH 0.515 08/01/2023    [id number] 26 (L) 01/11/2023     Assessment     1. Type 2 diabetes mellitus with hyperglycemia, without  long-term current use of insulin  dulaglutide (TRULICITY) 3 mg/0.5 mL pen injector    HEMOGLOBIN A1C      2. Subclinical hyperthyroidism        3. Class 1 obesity due to excess calories with serious comorbidity and body mass index (BMI) of 30.0 to 30.9 in adult            Plan     Type 2 diabetes mellitus with hyperglycemia, without long-term current use of insulin  - history of prediabetes for many years, now with worsening hyperglycemia weight gain in 2/2022  - treat as DM given longstand hx of prediabetes, insulin resistant hyperglycemia  - A1C is improving on therapy  - Increase Trulicity to 3.0mg once a week injection >> sent to pharmacy  - discussed with patient he is monitoring his diet  - Repeat lab work in 6 months for monitoring    Subclinical hyperthyroidism  - patient with longstanding history of low TSH with normal free T4,  since 2015  - idiopathic given negative TPO/TSI antibodies  - do not suspect hyperthyroidism given lack of symptoms.  With normal free T4. ? secondary dysfunction in setting of gamma knife  - rule out pituitary deficiency:  Normal hormonal workup as above  - no need for ATD medication given WNL FT4 and no symptoms  - otherwise repeat lab work every 6 mo    Class 1 obesity due to excess calories with serious comorbidity and body mass index (BMI) of 30.0 to 30.9 in adult  - Body mass index is 28.47 kg/m².  - dietary discussion as above  - continue to monitor weight  - weight lost noted  - increase GLP1  - encourage continue exercise    Advised patient to follow up with PCP for routine health maintenance care.   RTC in 6 months    Mckinley Romero M.D.  Endocrinology  [facility]  8/18/2023      Disclaimer: This note has been generated in part with the use of voice-recognition software. There may be typographical errors that have been missed during proof-reading.

## 2023-09-07 ENCOUNTER — OFFICE VISIT (OUTPATIENT)
Dept: OPTOMETRY | Facility: CLINIC | Age: 63
End: 2023-09-07
Payer: COMMERCIAL

## 2023-09-07 DIAGNOSIS — E11.9 TYPE 2 DIABETES MELLITUS WITHOUT OPHTHALMIC MANIFESTATIONS: ICD-10-CM

## 2023-09-07 DIAGNOSIS — H52.4 PRESBYOPIA: ICD-10-CM

## 2023-09-07 DIAGNOSIS — G93.89 BRAIN MASS: ICD-10-CM

## 2023-09-07 DIAGNOSIS — H50.00 ESOTROPIA: ICD-10-CM

## 2023-09-07 DIAGNOSIS — H53.40 VISUAL FIELD DEFECT OF RIGHT EYE: ICD-10-CM

## 2023-09-07 DIAGNOSIS — H04.123 DRY EYE SYNDROME, BILATERAL: ICD-10-CM

## 2023-09-07 DIAGNOSIS — E11.65 TYPE 2 DIABETES MELLITUS WITH HYPERGLYCEMIA, WITHOUT LONG-TERM CURRENT USE OF INSULIN: ICD-10-CM

## 2023-09-07 DIAGNOSIS — H49.21 SIXTH NERVE PALSY OF RIGHT EYE: Primary | ICD-10-CM

## 2023-09-07 PROCEDURE — 92015 DETERMINE REFRACTIVE STATE: CPT | Mod: S$GLB,,, | Performed by: OPTOMETRIST

## 2023-09-07 PROCEDURE — 1159F MED LIST DOCD IN RCRD: CPT | Mod: CPTII,S$GLB,, | Performed by: OPTOMETRIST

## 2023-09-07 PROCEDURE — 1160F PR REVIEW ALL MEDS BY PRESCRIBER/CLIN PHARMACIST DOCUMENTED: ICD-10-PCS | Mod: CPTII,S$GLB,, | Performed by: OPTOMETRIST

## 2023-09-07 PROCEDURE — 2023F DILAT RTA XM W/O RTNOPTHY: CPT | Mod: CPTII,S$GLB,, | Performed by: OPTOMETRIST

## 2023-09-07 PROCEDURE — 1159F PR MEDICATION LIST DOCUMENTED IN MEDICAL RECORD: ICD-10-PCS | Mod: CPTII,S$GLB,, | Performed by: OPTOMETRIST

## 2023-09-07 PROCEDURE — 99999 PR PBB SHADOW E&M-EST. PATIENT-LVL III: ICD-10-PCS | Mod: PBBFAC,,, | Performed by: OPTOMETRIST

## 2023-09-07 PROCEDURE — 3044F PR MOST RECENT HEMOGLOBIN A1C LEVEL <7.0%: ICD-10-PCS | Mod: CPTII,S$GLB,, | Performed by: OPTOMETRIST

## 2023-09-07 PROCEDURE — 92015 PR REFRACTION: ICD-10-PCS | Mod: S$GLB,,, | Performed by: OPTOMETRIST

## 2023-09-07 PROCEDURE — 3061F PR NEG MICROALBUMINURIA RESULT DOCUMENTED/REVIEW: ICD-10-PCS | Mod: CPTII,S$GLB,, | Performed by: OPTOMETRIST

## 2023-09-07 PROCEDURE — 3061F NEG MICROALBUMINURIA REV: CPT | Mod: CPTII,S$GLB,, | Performed by: OPTOMETRIST

## 2023-09-07 PROCEDURE — 3066F PR DOCUMENTATION OF TREATMENT FOR NEPHROPATHY: ICD-10-PCS | Mod: CPTII,S$GLB,, | Performed by: OPTOMETRIST

## 2023-09-07 PROCEDURE — 99214 OFFICE O/P EST MOD 30 MIN: CPT | Mod: S$GLB,,, | Performed by: OPTOMETRIST

## 2023-09-07 PROCEDURE — 3044F HG A1C LEVEL LT 7.0%: CPT | Mod: CPTII,S$GLB,, | Performed by: OPTOMETRIST

## 2023-09-07 PROCEDURE — 2023F PR DILATED RETINAL EXAM W/O EVID OF RETINOPATHY: ICD-10-PCS | Mod: CPTII,S$GLB,, | Performed by: OPTOMETRIST

## 2023-09-07 PROCEDURE — 4010F ACE/ARB THERAPY RXD/TAKEN: CPT | Mod: CPTII,S$GLB,, | Performed by: OPTOMETRIST

## 2023-09-07 PROCEDURE — 99999 PR PBB SHADOW E&M-EST. PATIENT-LVL III: CPT | Mod: PBBFAC,,, | Performed by: OPTOMETRIST

## 2023-09-07 PROCEDURE — 99214 PR OFFICE/OUTPT VISIT, EST, LEVL IV, 30-39 MIN: ICD-10-PCS | Mod: S$GLB,,, | Performed by: OPTOMETRIST

## 2023-09-07 PROCEDURE — 1160F RVW MEDS BY RX/DR IN RCRD: CPT | Mod: CPTII,S$GLB,, | Performed by: OPTOMETRIST

## 2023-09-07 PROCEDURE — 3066F NEPHROPATHY DOC TX: CPT | Mod: CPTII,S$GLB,, | Performed by: OPTOMETRIST

## 2023-09-07 PROCEDURE — 4010F PR ACE/ARB THEARPY RXD/TAKEN: ICD-10-PCS | Mod: CPTII,S$GLB,, | Performed by: OPTOMETRIST

## 2023-09-07 NOTE — PROGRESS NOTES
HPI    Pt is here today for diabetic eye exam. Pt states that he has noticed   changes with his vision since last exam. States that it is becoming harder   to read and that he would like to update glasses rx.   DLS: 3/24/2022 Dr. Walton  (-)Flashes (-)Floaters (+)Diplopia: when he isn't wearing his glasses   (-)Headaches (+)Itching: Occasionally  (-)Tearing (-) Burning (-)Dryness   (-) Photophobia (-)Glare  Past Eye Sx: No past ocular sx   Eye Meds: OTC Lubricant Drops OU PRN (rarely)   Hemoglobin A1C       Date                     Value               Ref Range             Status                08/01/2023               5.3                 4.0 - 5.6 %           Final              Comment:    ADA Screening Guidelines:  5.7-6.4%  Consistent with   prediabetes  >or=6.5%  Consistent with diabetes    High levels of fetal   hemoglobin interfere with the HbA1C  assay. Heterozygous hemoglobin   variants (HbS, HgC, etc)do  not significantly interfere with this assay.     However, presence of multiple variants may affect accuracy.         01/11/2023               5.4                 4.0 - 5.6 %           Final              Comment:    ADA Screening Guidelines:  5.7-6.4%  Consistent with   prediabetes  >or=6.5%  Consistent with diabetes    High levels of fetal   hemoglobin interfere with the HbA1C  assay. Heterozygous hemoglobin   variants (HbS, HgC, etc)do  not significantly interfere with this assay.     However, presence of multiple variants may affect accuracy.         07/11/2022               5.5                 4.0 - 5.6 %           Final              Comment:    ADA Screening Guidelines:  5.7-6.4%  Consistent with   prediabetes  >or=6.5%  Consistent with diabetes    High levels of fetal   hemoglobin interfere with the HbA1C  assay. Heterozygous hemoglobin   variants (HbS, HgC, etc)do  not significantly interfere with this assay.     However, presence of multiple variants may affect accuracy.    ----------      Last  edited by Randa Daugherty on 9/7/2023  1:33 PM.            Assessment /Plan     For exam results, see Encounter Report.    Sixth nerve palsy of right eye    Esotropia    Brain mass    Visual field defect of right eye    Presbyopia    Type 2 diabetes mellitus with hyperglycemia, without long-term current use of insulin    Type 2 diabetes mellitus without ophthalmic manifestations    Dry eye syndrome, bilateral      ***

## 2023-09-07 NOTE — PROGRESS NOTES
HPI    Pt is here today for diabetic eye exam. Pt states that he has noticed   changes with his vision since last exam. States that it is becoming harder   to read and that he would like to update glasses rx.   DLS: 3/24/2022 Dr. Walton  (-)Flashes (-)Floaters (+)Diplopia: when he isn't wearing his glasses   (-)Headaches (+)Itching: Occasionally  (-)Tearing (-) Burning (-)Dryness   (-) Photophobia (-)Glare  Past Eye Sx: No past ocular sx   Eye Meds: OTC Lubricant Drops OU PRN (rarely)   Hemoglobin A1C       Date                     Value               Ref Range             Status                08/01/2023               5.3                 4.0 - 5.6 %           Final              Comment:    ADA Screening Guidelines:  5.7-6.4%  Consistent with   prediabetes  >or=6.5%  Consistent with diabetes    High levels of fetal   hemoglobin interfere with the HbA1C  assay. Heterozygous hemoglobin   variants (HbS, HgC, etc)do  not significantly interfere with this assay.     However, presence of multiple variants may affect accuracy.         01/11/2023               5.4                 4.0 - 5.6 %           Final              Comment:    ADA Screening Guidelines:  5.7-6.4%  Consistent with   prediabetes  >or=6.5%  Consistent with diabetes    High levels of fetal   hemoglobin interfere with the HbA1C  assay. Heterozygous hemoglobin   variants (HbS, HgC, etc)do  not significantly interfere with this assay.     However, presence of multiple variants may affect accuracy.         07/11/2022               5.5                 4.0 - 5.6 %           Final              Comment:    ADA Screening Guidelines:  5.7-6.4%  Consistent with   prediabetes  >or=6.5%  Consistent with diabetes    High levels of fetal   hemoglobin interfere with the HbA1C  assay. Heterozygous hemoglobin   variants (HbS, HgC, etc)do  not significantly interfere with this assay.     However, presence of multiple variants may affect accuracy.    ----------      Last  edited by Randa Daugherty on 9/7/2023  1:33 PM.            Assessment /Plan     For exam results, see Encounter Report.    Sixth nerve palsy of right eye  Esotropia    Type 2 diabetes mellitus with hyperglycemia, without long-term current use of insulin  Type 2 diabetes mellitus without ophthalmic manifestations     Dry eye syndrome, bilateral              Use art tears BID/PRN     Sixth nerve palsy of right eye  Esotropia  Presbyopia              Continue with prism in specs              Updated Rx today     Brain mass  Visual field defect of right eye  2017 S/P Gamma Knife Radiation procedure 02/2017 by Dr. Robert MD       RTC 1 year

## 2023-12-12 ENCOUNTER — PATIENT MESSAGE (OUTPATIENT)
Dept: ENDOCRINOLOGY | Facility: CLINIC | Age: 63
End: 2023-12-12
Payer: COMMERCIAL

## 2023-12-12 ENCOUNTER — PATIENT MESSAGE (OUTPATIENT)
Dept: ADMINISTRATIVE | Facility: HOSPITAL | Age: 63
End: 2023-12-12
Payer: COMMERCIAL

## 2023-12-13 ENCOUNTER — PATIENT OUTREACH (OUTPATIENT)
Dept: ADMINISTRATIVE | Facility: HOSPITAL | Age: 63
End: 2023-12-13
Payer: COMMERCIAL

## 2023-12-13 DIAGNOSIS — E11.65 TYPE 2 DIABETES MELLITUS WITH HYPERGLYCEMIA, WITHOUT LONG-TERM CURRENT USE OF INSULIN: Primary | ICD-10-CM

## 2023-12-13 NOTE — PROGRESS NOTES
Health Maintenance Due   Topic Date Due    Pneumococcal Vaccines (Age 0-64) (1 - PCV) Never done    Foot Exam  Never done    HIV Screening  Never done    Low Dose Statin  Never done    Shingles Vaccine (1 of 2) Never done    RSV Vaccine (Age 60+ and Pregnant patients) (1 - 1-dose 60+ series) Never done    Lipid Panel  12/17/2022    Influenza Vaccine (1) 09/01/2023    COVID-19 Vaccine (5 - 2023-24 season) 09/01/2023    Diabetes Urine Screening  01/11/2024     Chart review done. HM updated. Immunizations reviewed & updated. Care Everywhere updated.  LABS ORDERED

## 2023-12-15 ENCOUNTER — PATIENT MESSAGE (OUTPATIENT)
Dept: FAMILY MEDICINE | Facility: CLINIC | Age: 63
End: 2023-12-15
Payer: COMMERCIAL

## 2023-12-18 ENCOUNTER — LAB VISIT (OUTPATIENT)
Dept: LAB | Facility: HOSPITAL | Age: 63
End: 2023-12-18
Attending: FAMILY MEDICINE
Payer: COMMERCIAL

## 2023-12-18 DIAGNOSIS — E11.65 TYPE 2 DIABETES MELLITUS WITH HYPERGLYCEMIA, WITHOUT LONG-TERM CURRENT USE OF INSULIN: ICD-10-CM

## 2023-12-18 LAB
ALBUMIN/CREAT UR: 9.7 UG/MG (ref 0–30)
CHOLEST SERPL-MCNC: 164 MG/DL (ref 120–199)
CHOLEST/HDLC SERPL: 3.8 {RATIO} (ref 2–5)
CREAT UR-MCNC: 217 MG/DL (ref 23–375)
HDLC SERPL-MCNC: 43 MG/DL (ref 40–75)
HDLC SERPL: 26.2 % (ref 20–50)
LDLC SERPL CALC-MCNC: 108 MG/DL (ref 63–159)
MICROALBUMIN UR DL<=1MG/L-MCNC: 21 UG/ML
NONHDLC SERPL-MCNC: 121 MG/DL
TRIGL SERPL-MCNC: 65 MG/DL (ref 30–150)

## 2023-12-18 PROCEDURE — 82043 UR ALBUMIN QUANTITATIVE: CPT | Performed by: FAMILY MEDICINE

## 2023-12-18 PROCEDURE — 80061 LIPID PANEL: CPT | Performed by: FAMILY MEDICINE

## 2023-12-18 PROCEDURE — 36415 COLL VENOUS BLD VENIPUNCTURE: CPT | Mod: PO | Performed by: FAMILY MEDICINE

## 2024-02-15 ENCOUNTER — LAB VISIT (OUTPATIENT)
Dept: LAB | Facility: HOSPITAL | Age: 64
End: 2024-02-15
Attending: HOSPITALIST
Payer: COMMERCIAL

## 2024-02-15 DIAGNOSIS — E11.65 TYPE 2 DIABETES MELLITUS WITH HYPERGLYCEMIA, WITHOUT LONG-TERM CURRENT USE OF INSULIN: ICD-10-CM

## 2024-02-15 LAB
ESTIMATED AVG GLUCOSE: 103 MG/DL (ref 68–131)
HBA1C MFR BLD: 5.2 % (ref 4–5.6)

## 2024-02-15 PROCEDURE — 83036 HEMOGLOBIN GLYCOSYLATED A1C: CPT | Performed by: HOSPITALIST

## 2024-02-15 PROCEDURE — 36415 COLL VENOUS BLD VENIPUNCTURE: CPT | Mod: PO | Performed by: HOSPITALIST

## 2024-02-19 ENCOUNTER — OFFICE VISIT (OUTPATIENT)
Dept: ENDOCRINOLOGY | Facility: CLINIC | Age: 64
End: 2024-02-19
Payer: COMMERCIAL

## 2024-02-19 DIAGNOSIS — I10 PRIMARY HYPERTENSION: ICD-10-CM

## 2024-02-19 DIAGNOSIS — E66.09 CLASS 1 OBESITY DUE TO EXCESS CALORIES WITH SERIOUS COMORBIDITY AND BODY MASS INDEX (BMI) OF 30.0 TO 30.9 IN ADULT: ICD-10-CM

## 2024-02-19 DIAGNOSIS — E11.65 TYPE 2 DIABETES MELLITUS WITH HYPERGLYCEMIA, WITHOUT LONG-TERM CURRENT USE OF INSULIN: Primary | ICD-10-CM

## 2024-02-19 DIAGNOSIS — E05.90 SUBCLINICAL HYPERTHYROIDISM: ICD-10-CM

## 2024-02-19 DIAGNOSIS — E55.9 HYPOVITAMINOSIS D: ICD-10-CM

## 2024-02-19 PROCEDURE — 99214 OFFICE O/P EST MOD 30 MIN: CPT | Mod: S$GLB,,, | Performed by: HOSPITALIST

## 2024-02-19 PROCEDURE — 99999 PR PBB SHADOW E&M-EST. PATIENT-LVL III: CPT | Mod: PBBFAC,,, | Performed by: HOSPITALIST

## 2024-02-19 PROCEDURE — 3072F LOW RISK FOR RETINOPATHY: CPT | Mod: CPTII,S$GLB,, | Performed by: HOSPITALIST

## 2024-02-19 PROCEDURE — 1159F MED LIST DOCD IN RCRD: CPT | Mod: CPTII,S$GLB,, | Performed by: HOSPITALIST

## 2024-02-19 PROCEDURE — 3044F HG A1C LEVEL LT 7.0%: CPT | Mod: CPTII,S$GLB,, | Performed by: HOSPITALIST

## 2024-02-19 RX ORDER — DULAGLUTIDE 3 MG/.5ML
3 INJECTION, SOLUTION SUBCUTANEOUS
Qty: 12 PEN | Refills: 3 | Status: SHIPPED | OUTPATIENT
Start: 2024-02-19 | End: 2025-02-18

## 2024-02-19 RX ORDER — AMLODIPINE BESYLATE 10 MG/1
10 TABLET ORAL
Qty: 90 TABLET | Refills: 1 | Status: SHIPPED | OUTPATIENT
Start: 2024-02-19

## 2024-02-19 NOTE — PROGRESS NOTES
"Subjective:      Patient ID: Blas Alcocer is a 63 y.o. male presented to [facility] on 2/19/2024.  Chief Complaint:  Diabetes    History of Present Illness: Blas Alcocer is a 63 y.o. 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    Still with weight loss, Now back on exercise regimen.    Tolerating Trulicity 3.0 mg once a week, Getting it from mail-in pharmacy  Weight today in clinic 176 << , previous office visit 07/2022: 181, 182 lb, 188 lb, started Trulicity @ 200lb  Patient reports his weight at home to be about 167.  He is very happy with the weight  No sign of palpitation, tremors.  No goiter  Walking 4-5 miles a day  Stretching more      1) Type 2 diabetes/prediabetes  - Diagnosed w/ DM:  prediabetes since 2019 with recent hyperglycemia on fasting blood work  - Patient is also interested in weight loss  - Does have have glucometer    - Current meds:  Trulicity 3 mg once a week  - Weight trend: decrease noted  - Diabetes Education: No  - Diabetes Related Hospitalization:  No  - Hx of pancreatitis, hx of thyroid cancer: No  - Family history of diabetes: Yes  - Occupation: working    Eye exam current (within one year): yes, DR: no  Reports cuts or ulcers on feet:   Denies  Statin: Not taking, ACE/ARB: Taking    Diabetes lab work  Lab Results   Component Value Date    HGBA1C 5.2 02/15/2024    HGBA1C 5.3 08/01/2023    HGBA1C 5.4 01/11/2023    HGBA1C 5.5 07/11/2022     No results found for: "CPEPTIDE", "GLUTAMICACID", "ISLETCELLANT"   No results found for: "FRUCTOSAMINE"  Lab Results   Component Value Date    MICALBCREAT 9.7 12/18/2023     No results found for: "[id number]"    Diabetes Management Status: Reviewed this office visit  Screening or " Prevention Patient's value Goal Complete/Controlled?   Lipid profile : 12/18/2023 Annually No     Dilated retinal exam : 09/07/2023 Annually Yes     Foot exam   Most Recent Foot Exam Date: Not Found Annually No          2) Subclinical hyperthyroidism   - Abnormal thyroid lab: Low TSH, normal free T4, subclinical hyperthyroidism?>> longstanding history prior to gamma knife  - First diagnosed: 2015?, results shows decrease in TSH from 6227-9530.  Possibly worsen  post Gamma Knife treatment 2018  - Hormonal workup done showed normal antibodies:  Negative TSI, TPO  - No other pituitary issues:  Recent blood work low suspicion for hypopituitarism  - No family hx   - monitor we have been monitoring lab work.  Never started patient on medication given stable free T4    - Patient denies any symptoms of hyperthyroidism including palpitation, tremors, weight loss  - Patient reports fatigue off and on otherwise does have issue with some weight gain. Patient denies headache, blurred vision  - No family history of thyroid disorder that he can recall  - Besides from Gamma Knife no other radiation exposure  - In the past had testosterone evaluated by PCP:  Normal, of note prior to gamma knife  - Did have 2 COVID infection asymptomatic per patient last 12/21  - Denies any use of:  Amiodarone/lithium  - No new medication.  Compliant with blood pressure medication amlodipine, lisinopril  - Herbal/Vitamin/Supplement:  Denies take Kelp, Iodine, Biotin, Selenium, Sea sarabia, Bladder wrack    Thyroid lab work  Lab Results   Component Value Date    TSH 0.515 08/01/2023    TSH 0.344 (L) 01/11/2023    TSH 0.317 (L) 07/11/2022    TSH 0.295 (L) 04/12/2022    FREET4 1.00 08/01/2023    FREET4 1.01 01/11/2023    FREET4 0.96 07/11/2022    FREET4 0.96 04/12/2022    C0UDPXH 106 04/12/2022    F1NISMD 114 07/07/2015      Antibodies  Lab Results   Component Value Date    THYROPEROXID <6.0 04/12/2022    THYROIDSTIMI <0.10 04/12/2022         Reviewed past  surgical, medical, family, social history and updated as appropriate.  Review of Systems: see HPI above    Objective:   There were no vitals taken for this visit.    There is no height or weight on file to calculate BMI.  Vital signs reviewed    Physical Exam  Vitals and nursing note reviewed.   Constitutional:       General: He is not in acute distress.     Appearance: Normal appearance. He is well-developed. He is obese. He is not toxic-appearing.   Neck:      Thyroid: No thyromegaly.      Comments: No goiter noted  Cardiovascular:      Heart sounds: Normal heart sounds.   Pulmonary:      Effort: Pulmonary effort is normal. No respiratory distress.   Abdominal:      Tenderness: There is no abdominal tenderness.   Musculoskeletal:         General: No deformity. Normal range of motion.      Cervical back: Normal range of motion.   Skin:     Findings: No bruising.   Neurological:      Mental Status: He is alert and oriented to person, place, and time.   Psychiatric:         Mood and Affect: Mood normal.       Lab Reviewed:  See results in subjective  Lab Results   Component Value Date    HGBA1C 5.2 02/15/2024     Lab Results   Component Value Date    CHOL 164 12/18/2023    HDL 43 12/18/2023    LDLCALC 108.0 12/18/2023    TRIG 65 12/18/2023    CHOLHDL 26.2 12/18/2023     Lab Results   Component Value Date     08/01/2023    K 3.8 08/01/2023     08/01/2023    CO2 24 08/01/2023     (H) 08/01/2023    BUN 12 08/01/2023    CREATININE 1.0 08/01/2023    CALCIUM 9.6 08/01/2023    PROT 6.8 04/12/2022    ALBUMIN 4.0 04/12/2022    ALBUMIN 4.3 04/12/2022    BILITOT 1.0 04/12/2022    ALKPHOS 47 (L) 04/12/2022    AST 24 04/12/2022    ALT 29 04/12/2022    ANIONGAP 8 08/01/2023    ESTGFRAFRICA >60 07/11/2022    EGFRNONAA >60 07/11/2022    TSH 0.515 08/01/2023    [id number] 26 (L) 01/11/2023     Assessment     1. Type 2 diabetes mellitus with hyperglycemia, without long-term current use of insulin  HEMOGLOBIN A1C     Basic Metabolic Panel    dulaglutide (TRULICITY) 3 mg/0.5 mL pen injector      2. Subclinical hyperthyroidism  TSH    T4, Free      3. Class 1 obesity due to excess calories with serious comorbidity and body mass index (BMI) of 30.0 to 30.9 in adult        4. Hypovitaminosis D  Vitamin D            Plan     Type 2 diabetes mellitus with hyperglycemia, without long-term current use of insulin  - history of prediabetes for many years, now with worsening hyperglycemia weight gain in 2/2022  - treat as DM given longstand hx of prediabetes, insulin resistant hyperglycemia  - A1C is improving on therapy  - Continue Trulicity to 3.0mg once a week injection >> sent to pharmacy  - discussed with patient he is monitoring his diet  - Repeat lab work in 6 months for monitoring    Subclinical hyperthyroidism  - patient with longstanding history of low TSH with normal free T4,  since 2015  - idiopathic given negative TPO/TSI antibodies  - Stable thyroid function repeat lab work every 6 months      Class 1 obesity due to excess calories with serious comorbidity and body mass index (BMI) of 30.0 to 30.9 in adult  - There is no height or weight on file to calculate BMI.  - dietary discussion as above  - continue to monitor weight, encourage patient to continue weight loss  - encourage continue exercise    Hypovitaminosis D  - lab work reviewed, low   - advised patient to start  vitamin D3 2000 IU daily   - yearly monitor      Advised patient to follow up with PCP for routine health maintenance care.   RTC in 6 months    Mckinley Romero M.D.  Endocrinology  [facility]  2/19/2024      Disclaimer: This note has been generated in part with the use of voice-recognition software. There may be typographical errors that have been missed during proof-reading.

## 2024-02-19 NOTE — ASSESSMENT & PLAN NOTE
- lab work reviewed, low   - advised patient to start  vitamin D3 2000 IU daily   - yearly monitor

## 2024-02-19 NOTE — ASSESSMENT & PLAN NOTE
- history of prediabetes for many years, now with worsening hyperglycemia weight gain in 2/2022  - treat as DM given longstand hx of prediabetes, insulin resistant hyperglycemia  - A1C is improving on therapy  - Continue Trulicity to 3.0mg once a week injection >> sent to pharmacy  - discussed with patient he is monitoring his diet  - Repeat lab work in 6 months for monitoring

## 2024-02-19 NOTE — TELEPHONE ENCOUNTER
Refill Decision Note   Blas Alcocer  is requesting a refill authorization.  Brief Assessment and Rationale for Refill:  Approve     Medication Therapy Plan:         Comments:     Note composed:6:28 AM 02/19/2024

## 2024-02-19 NOTE — ASSESSMENT & PLAN NOTE
- There is no height or weight on file to calculate BMI.  - dietary discussion as above  - continue to monitor weight, encourage patient to continue weight loss  - encourage continue exercise

## 2024-02-19 NOTE — ASSESSMENT & PLAN NOTE
- patient with longstanding history of low TSH with normal free T4,  since 2015  - idiopathic given negative TPO/TSI antibodies  - Stable thyroid function repeat lab work every 6 months

## 2024-04-01 ENCOUNTER — PATIENT MESSAGE (OUTPATIENT)
Dept: FAMILY MEDICINE | Facility: CLINIC | Age: 64
End: 2024-04-01
Payer: COMMERCIAL

## 2024-04-01 ENCOUNTER — PATIENT MESSAGE (OUTPATIENT)
Dept: OTHER | Facility: OTHER | Age: 64
End: 2024-04-01
Payer: COMMERCIAL

## 2024-04-01 DIAGNOSIS — G89.29 CHRONIC BILATERAL LOW BACK PAIN WITHOUT SCIATICA: Primary | ICD-10-CM

## 2024-04-01 DIAGNOSIS — M54.50 CHRONIC BILATERAL LOW BACK PAIN WITHOUT SCIATICA: Primary | ICD-10-CM

## 2024-04-11 ENCOUNTER — CLINICAL SUPPORT (OUTPATIENT)
Dept: REHABILITATION | Facility: OTHER | Age: 64
End: 2024-04-11
Payer: COMMERCIAL

## 2024-04-11 DIAGNOSIS — G89.29 CHRONIC BILATERAL LOW BACK PAIN WITHOUT SCIATICA: ICD-10-CM

## 2024-04-11 DIAGNOSIS — M54.50 CHRONIC BILATERAL LOW BACK PAIN WITHOUT SCIATICA: ICD-10-CM

## 2024-04-11 DIAGNOSIS — M54.59 MECHANICAL LOW BACK PAIN: Primary | ICD-10-CM

## 2024-04-11 PROCEDURE — 97161 PT EVAL LOW COMPLEX 20 MIN: CPT | Mod: PN

## 2024-04-11 PROCEDURE — 97530 THERAPEUTIC ACTIVITIES: CPT | Mod: PN

## 2024-04-16 NOTE — PROGRESS NOTES
"[facility]   Physical Therapy Treatment Note      Name: Blas Alcocer  Clinic Number: [id number]    Therapy Diagnosis: No diagnosis found.  Physician: Mando Maier MD    Visit Date: 4/17/2024    Physician Orders: PT Eval and Treat   Medical Diagnosis from Referral: M54.50,G89.29 (ICD-10-CM) - Chronic bilateral low back pain without sciatica  Evaluation Date: 4/11/2024  Authorization Period Expiration: 04/01/2025  Plan of Care Expiration: 7/11/2024  Progress Note Due: 5/11/2024  Date of Surgery: n/a  Visit # / Visits authorized: 1/ 1   FOTO: 1/ 3     Precautions: Standard, DM, visual deficits related to brain mass     Time In: 357pm - late for appt  Time Out: 430pm  Total Billable Time: 33 minutes    PTA Visit #: ***/5       Subjective     Patient reports: ***.  He {Actions; was/was not:63009} compliant with home exercise program.  Response to previous treatment: ***  Functional change: ***    Pain: {0-10:67543::"0"}/10  Location: {RIGHT/LEFT/BILATERAL:98162} {LOCATION ON BODY:87366}     Objective      Objective Measures updated at progress report unless specified.     Treatment     Blas received the treatments listed below:      therapeutic exercises to develop {AMB PT PROGRESS OBJECTIVE:25399} for *** minutes including:  ***    manual therapy techniques: {AMB PT PROGRESS MANUAL THERAPY:59404} were applied to the: *** for *** minutes, including:  ***    neuromuscular re-education activities to improve: {AMB PT PROGRESS NEURO RE-ED:80506} for *** minutes. The following activities were included:  ***    therapeutic activities to improve functional performance for ***  minutes, including:  ***    gait training to improve functional mobility and safety for ***  minutes, including:  ***    direct contact modalities after being cleared for contraindications: {AMB PT PROGRESS DIRECT CONTACT MODES:43970}    supervised modalities after being cleared for contradictions: {AMB PT SUPERVISED " "MODES:35374}    hot pack for *** minutes to ***.    cold pack for *** minutes to ***.    Patient Education and Home Exercises       Education provided:   - ***    Written Home Exercises Provided: {Blank single:10600::"yes","Patient instructed to cont prior HEP"}. Exercises were reviewed and Blas was able to demonstrate them prior to the end of the session.  Blas demonstrated {Desc; good/fair/poor:98714} understanding of the education provided. See Electronic Medical Record under Patient Instructions for exercises provided during therapy sessions    Assessment     ***    Blas {IS/IS NOT:74084} progressing well towards his goals.   Patient prognosis is Good.     Patient will continue to benefit from skilled outpatient physical therapy to address the deficits listed in the problem list box on initial evaluation, provide pt/family education and to maximize pt's level of independence in the home and community environment.     Patient's spiritual, cultural and educational needs considered and pt agreeable to plan of care and goals.     Anticipated barriers to physical therapy: standard, chronicity of sx, commute to clinic (lives near [facility])     Goals: ***    Plan     ***    Jaqueline Lowery, PTA     "

## 2024-04-17 ENCOUNTER — CLINICAL SUPPORT (OUTPATIENT)
Dept: REHABILITATION | Facility: OTHER | Age: 64
End: 2024-04-17
Payer: COMMERCIAL

## 2024-04-17 DIAGNOSIS — M54.59 MECHANICAL LOW BACK PAIN: Primary | ICD-10-CM

## 2024-04-17 PROCEDURE — 97140 MANUAL THERAPY 1/> REGIONS: CPT | Mod: PN

## 2024-04-17 PROCEDURE — 97112 NEUROMUSCULAR REEDUCATION: CPT | Mod: PN

## 2024-04-17 PROCEDURE — 97110 THERAPEUTIC EXERCISES: CPT | Mod: PN

## 2024-04-18 NOTE — PLAN OF CARE
[facility]   Physical Therapy Initial Evaluation      Name: Blas Alcocer  Clinic Number: [id number]    Therapy Diagnosis:   Encounter Diagnoses   Name Primary?    Chronic bilateral low back pain without sciatica     Mechanical low back pain Yes        Physician: Mando Maier MD    Physician Orders: PT Eval and Treat   Medical Diagnosis from Referral: M54.50,G89.29 (ICD-10-CM) - Chronic bilateral low back pain without sciatica  Evaluation Date: 4/11/2024  Authorization Period Expiration: 04/01/2025  Plan of Care Expiration: 7/11/2024  Progress Note Due: 5/11/2024  Date of Surgery: n/a  Visit # / Visits authorized: 1/ 1   FOTO: 1/ 3    Precautions: Standard, DM, visual deficits related to brain mass    Time In: 345  Time Out: 430pm  Total Billable Time: 45 minutes    Subjective     Date of onset: chronic    History of current condition - Edgard reports: Chronic bilateral low back pain for years without PATRICK or trauma around time of onset. Gradual worsening over time, with pain more persisted during sitting activities such as sitting on the couch or at his desk. Says active daily outside of the office and only feels stiffness in the low back after prolonged activity. Pain mainly on the R>L left low back along beltline.    Pt denies drop foot, change of B/B control, saddle paresthesias, unexplained weight gain/loss, fever, chills or night sweats.     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)  Recreation: walking daily (4-5 miles), play golf, yard work  Prior Level of Function: Indep  Current Level of Function: pain and difficulty with all functional activities    Pain:  Current 3/10, worst 6/10, best 2/10   Location: milagro low back  Description: stiffness, tightness  Aggravating Factors: sitting on a low couch, prolonged sitting with work, HHCs, AM (but denies sleep  "disruption)  Easing Factors: Tylenol prn, massage gun, hot shower, stretching (bending over to touch the floor, yoga stretches = forward flexion in sitting, piriformis figure 4 stretch)    Patients goals: resolve pain     Medical History:   Past Medical History:   Diagnosis Date    Dyslipidemia (high LDL; low HDL) 6/13    History of elevated prostate specific antigen (PSA)     Hypertension     Hypovitaminosis D 6/13    Prediabetes 6/13    Sixth nerve palsy of right eye 12/6/2016    Type 2 diabetes mellitus with hyperglycemia, without long-term current use of insulin 7/18/2022       Surgical History:   Blas Alcocer  has a past surgical history that includes Prostate biopsy and Colonoscopy (N/A, 3/18/2022).    Medications:   Blas has a current medication list which includes the following prescription(s): amlodipine, cholecalciferol (vitamin d3), trulicity, fish oil-omega-3 fatty acids, gavilyte-g, lisinopril-hydrochlorothiazide, and saw palmetto, and the following Facility-Administered Medications: gentamicin.    Allergies:   Review of patient's allergies indicates:  No Known Allergies     Objective      Postural examination/scapula alignment: Rounded shoulder, Head forward, and Decreased lordosis  Sensory deficit: none  Reflexes: intact    Palpation: increased tone/tenderness to R>L lumbar paraspinals (peak TTP adjacent to iliac crest), R glutes/piriformis > L    Thoracic/Lumbar AROM: Pain/Dysfunction with Movement:   Flexion Min patrick, fingertips to mid-tibia, flat back, increased hip hinging, c/o stiffness   Extension Min patrick, fulcrum at lower LSP, c/o stiffness   Right side bending Mod patrick, fingertips >1.5" to knee joint line, stiffness in low back   Left side bending Mod patrick, fingertips >1.5" to knee joint line, stiffness in low back   Right rotation Mod patrick in Active, min patrick passive OP, stiffness at endrange   Left rotation Mod patrick in Active, min patrick passive OP, stiffness at endrange     Trunk strength: " "extensors = fair+, abdominals = fair- (instability noted with ASLR R>L)    Hip ROM: mod patrick hip IR Giancarlo  Knee ROM: WNL B    Lower Extremity Strength  Right LE  Left LE    Hip flexion: 4+/5 Hip flexion: 4+/5   Hip extension:  4+/5 - knee ext  4-/5 - knee flex Hip extension: 4+/5 - knee ext  4-/5 - knee flex   Hip abduction: 4-/5 Hip abduction: 4-/5   Hip adduction:  5/5 Hip adduction:  5/5   Hip Internal rotation   4+/5 Hip Internal rotation 4+/5   Knee Flexion 5/5 Knee Flexion 5/5   Knee Extension 5/5 Knee Extension 5/5   Ankle dorsiflexion: 5/5 Ankle dorsiflexion: 5/5   Ankle plantarflexion: 5/5 Ankle plantarflexion: 5/5     Flexibility:   Hi test: Hip flexors: WFL  Ely's: Quads: WFL  Peter test: ITB: WFL  Hamstring (SLR): 70 deg  MARIAELENA: mod hypo  FADDIR: min hypo    Joint Mobility: L2-S1 RR/LR glides = mod hypo with tight mm EF, mod hypo sacral glides (rotation>nutation)    Special Tests:   Test Name  Test Result   Prone Instability Test (--)   Lumbar Quadrant test (--)   Straight Leg Raise (--) for neural tension, (+) for trunk instability   Neural Tension Test (Slump) (--)   Crossed Straight Leg Raise (--)   Walking on toes (--)   Walking on heels  (--)   Clonus (--)   MARIAELENA (--)   FADIR (--)   SI Joint Provocation Test (compression / distraction) (--)     Functional Movement Analysis:   Gait: I  Sit<>stand: I  Bed mobility: I  DL squat: dysfunctional with decreased trunk ext    Balance: SLS = 10" with mod sway, no LOB      Intake Outcome Measure for FOTO lumbar spine Survey    Therapist reviewed FOTO scores for Blas Alcocer on 4/11/2024.   FOTO report - see Media section or FOTO account episode details.             Treatment     Total Treatment time (time-based codes) separate from Evaluation: 23 minutes     Blas received the treatments listed below:      therapeutic exercises to develop strength, endurance, ROM, flexibility, posture, and core stabilization for 00 minutes including:  None today    manual " "therapy techniques: Joint mobilizations, Manual traction, Myofacial release, and Soft tissue Mobilization were applied to the: LSP for 00 minutes, including:  None today.   Consider dry needling or cupping next visit    neuromuscular re-education activities to improve: Balance, Coordination, Kinesthetic, Sense, Proprioception, and Posture for 00 minutes. The following activities were included:  None today    therapeutic activities to improve functional performance for 23  minutes, including:  Pt edu on dx, pathogenesis, prognosis, PT POC - see below  Pt edu on HEP, frequency/duration, and importance of compliance/consistency for sx modulation:   LTR 15 x 5" holds  Seated firgure 4 (piriformis stretch) 3 x 30"  PPT 10 x 10"  Modified downward dog 10 x 5"      Patient Education and Home Exercises     Education provided:   - Patient educated regarding pathogenesis, diagnosis, protocol, prognosis, POC, and HEP, including use of visual assistance for understanding of anatomy and dysfunction. Written Home Exercises Provided with written and verbal instructions for frequency and duration of the following exercises: see list above. Pt educated on HEP and activity modifications to reduce c/o pain and improve overall function.   - Pt was educated in posture and body mechanics.  Use of a lumbar roll was recommended and demonstrated here today.  Purchase information provided.   - Pt also educated on use of modalities prn to reduce c/o pain and dysfunction.   - Pt educated on clinic's cancellation/no-show policy of missing 3 consecutive PT appointments, which will result in an automatic discharge from therapy services 2* to non-compliance, unless otherwise stated.   - Patient demo good understanding of the education provided. Patient demo good return demo of skill of exercises.      Written Home Exercises Provided: yes. Exercises were reviewed and Blas was able to demonstrate them prior to the end of the session.  Blas " demonstrated good  understanding of the education provided. See EMR under Patient Instructions for exercises provided during therapy sessions.    Assessment     Blas is a 63 y.o. male referred to outpatient Physical Therapy with a medical diagnosis of M54.50,G89.29 (ICD-10-CM) - Chronic bilateral low back pain without sciatica. Patient presents with marked limitations in ROM, joint and myofascial mobility, flexibility, strength, postural awareness/endurance, motor control and coordination. S/s associated with referring diagnosis, with trunk weakness and overuse of extensors contributing to c/o pain, lumbopelvic instability. Impairments limit pt with all functional activities including sitting with work activities.      Patient prognosis is Good.   Patient will benefit from skilled outpatient Physical Therapy to address the deficits stated above and in the chart below, provide patient /family education, and to maximize patientt's level of independence.     Plan of care discussed with patient: Yes  Patient's spiritual, cultural and educational needs considered and patient is agreeable to the plan of care and goals as stated below:     Anticipated Barriers for therapy: standard, chronicity of sx, commute to clinic (lives near [facility])    Medical Necessity is demonstrated by the following  History  Co-morbidities and personal factors that may impact the plan of care [] LOW: no personal factors / co-morbidities  [x] MODERATE: 1-2 personal factors / co-morbidities  [] HIGH: 3+ personal factors / co-morbidities    Moderate / High Support Documentation:   Co-morbidities affecting plan of care: DM, visual deficits related to brain mass    Personal Factors:   no deficits     Examination  Body Structures and Functions, activity limitations and participation restrictions that may impact the plan of care [] LOW: addressing 1-2 elements  [] MODERATE: 3+ elements  [x] HIGH: 4+ elements (please support  below)    Moderate / High Support Documentation: joint mobility, flexibility, ROM, strength, postural awareness and endurance, motor control/coordination, sitting with work activities, driving     Clinical Presentation [x] LOW: stable  [] MODERATE: Evolving  [] HIGH: Unstable     Decision Making/ Complexity Score: low       Goals:  Short Term Goals (4 Weeks):   1. Pt will report 20% reduction in pain of the lumbar spine and LE for ease with ADL's  2. PT will demonstrate improved trunk strength by a half grade in for ease with upright posture during standing activities.  3. Pt will demonstrate improved lumbar spine ROM in all directions by a half grade for ease with bending activities.   Long Term Goals (12 Weeks):   1. Pt will report being independent with HEP for maintenance of improvements gained during therapy sessions  2. PT will report 50% reduction of pain of the back and LE for ease with dressing and grooming activities.   3. Pt will demonstrate trunk and extremity strength to 5/5 without the provocation of pain for ease with household chores  4. Pt will demonstrate appropriate upright posture without external cueing for ease with work related activities.     Plan     Plan of care Certification: 4/11/2024 to 7/11/2024.    Outpatient Physical Therapy 2 times weekly for 12 weeks to include the following interventions: Aquatic Therapy, Cervical/Lumbar Traction, Electrical Stimulation prn, Gait Training, Iontophoresis (with dexamethasone prn), Manual Therapy, Moist Heat/ Ice, Neuromuscular Re-ed, Patient Education, Self Care, Therapeutic Activities, and Therapeutic Exercise. Progress HEP towards D/C. Recommend F/U with MD if symptoms worsen or do not resolve. Patient may be seen by a PTA for treatment to carry out their plan of care.  Face-to-face conferences will be held.      Jennifer Manley, PT        Physician's Signature: _________________________________________ Date: ________________

## 2024-04-23 ENCOUNTER — CLINICAL SUPPORT (OUTPATIENT)
Dept: REHABILITATION | Facility: OTHER | Age: 64
End: 2024-04-23
Payer: COMMERCIAL

## 2024-04-23 ENCOUNTER — DOCUMENTATION ONLY (OUTPATIENT)
Dept: REHABILITATION | Facility: OTHER | Age: 64
End: 2024-04-23

## 2024-04-23 DIAGNOSIS — M54.59 MECHANICAL LOW BACK PAIN: Primary | ICD-10-CM

## 2024-04-23 PROCEDURE — 97112 NEUROMUSCULAR REEDUCATION: CPT | Mod: PN,CQ

## 2024-04-23 PROCEDURE — 97110 THERAPEUTIC EXERCISES: CPT | Mod: PN,CQ

## 2024-04-23 PROCEDURE — 97530 THERAPEUTIC ACTIVITIES: CPT | Mod: PN,CQ

## 2024-04-23 NOTE — PROGRESS NOTES
"[facility]   Physical Therapy Treatment Note       Name: Blas BARTLETT [facility]  Clinic Number: [id number]     Therapy Diagnosis:   Encounter Diagnosis   Name Primary?    Mechanical low back pain Yes       Physician: Mando Maier MD     Visit Date: 4/23/2024      Physician Orders: PT Eval and Treat   Medical Diagnosis from Referral: M54.50,G89.29 (ICD-10-CM) - Chronic bilateral low back pain without sciatica  Evaluation Date: 4/11/2024  Authorization Period Expiration: 04/01/2025  Plan of Care Expiration: 7/11/2024  Progress Note Due: 5/11/2024  Date of Surgery: n/a  Visit # / Visits authorized: 2/ 1   FOTO: 1/ 3     Precautions: Standard, DM, visual deficits related to brain mass     Time In: 745am  Time Out: 830am  Total Billable Time: 45 minutes     PTA Visit #: 0/5         Subjective      Patient reports: not sore after initial visit. Feels like the exercises help  He was compliant with home exercise program.  Response to previous treatment: good  Functional change: none. Continued aggravating factors include: sitting on a low couch, prolonged sitting with work      Pain: 3/10  Location: bilateral low back     Objective       Objective Measures updated at progress report unless specified.      Treatment      Blas received the treatments listed below:       therapeutic exercises to develop strength, endurance, ROM, flexibility, posture, and core stabilization for 8 minutes including:  LTR 10 x 5" holds  DKTC 3 x 20"  Seated firgure 4 (piriformis stretch) 3 x 30"     manual therapy techniques: Joint mobilizations, Manual traction, Myofacial release, and Soft tissue Mobilization were applied to the: LSP for 14 minutes, including:  Dry needling x 14 min - see note by Jennifer Patel, PT     neuromuscular re-education activities to improve: Balance, Coordination, Kinesthetic, Sense, Proprioception, and Posture for 23 minutes. The following activities were included:  PPT 10 x 10"  Modified " "downward dog 10 x 5"    TA x 2 min x 5" holds  TA + BKFO x 2x10  TA + march x 2x10  TA + heel slide x 2x10     therapeutic activities to improve functional performance for 00  minutes, including:  None today.  Added rows, lat pulls, anti rotations nv        Patient Education and Home Exercises        Education provided:   - 4/17/24- edu on dry needling in conjunction with PT therex program     Written Home Exercises Provided: Patient instructed to cont prior HEP. Exercises were reviewed and Blas was able to demonstrate them prior to the end of the session.  Blas demonstrated good  understanding of the education provided. See Electronic Medical Record under Patient Instructions for exercises provided during therapy sessions     Assessment      Good return technique with HEP, indicating good compliance at home. Initiate TA activation with and without LE dual task today to promote spine stabilization. Good tolerance but with fatigue by end of session. Initiated dry needling as pt continues to present with increased tone/tenderness to R>L lumbar paraspinals and QL. Marked improvement in soft tissue quality by end of session.     Blas Is progressing well towards his goals.   Patient prognosis is Good.      Patient will continue to benefit from skilled outpatient physical therapy to address the deficits listed in the problem list box on initial evaluation, provide pt/family education and to maximize pt's level of independence in the home and community environment.      Patient's spiritual, cultural and educational needs considered and pt agreeable to plan of care and goals.     Anticipated barriers to physical therapy: standard, chronicity of sx, commute to clinic (lives near [facility])      Goals: updated 4/23/2024   Short Term Goals (4 Weeks):   1. Pt will report 20% reduction in pain of the lumbar spine and LE for ease with ADL's (not met, progressing)  2. PT will demonstrate improved trunk strength by " a half grade in for ease with upright posture during standing activities. (not met, progressing)  3. Pt will demonstrate improved lumbar spine ROM in all directions by a half grade for ease with bending activities.  (not met, progressing)  Long Term Goals (12 Weeks):   1. Pt will report being independent with HEP for maintenance of improvements gained during therapy sessions (not met, progressing)  2. PT will report 50% reduction of pain of the back and LE for ease with dressing and grooming activities.  (not met, progressing)  3. Pt will demonstrate trunk and extremity strength to 5/5 without the provocation of pain for ease with household chores (not met, progressing)  4. Pt will demonstrate appropriate upright posture without external cueing for ease with work related activities.   (not met, progressing)     Plan      Continue with POC for strength, stabilization.      Jennifer Manley, PT

## 2024-04-23 NOTE — PROGRESS NOTES
"[facility]   Physical Therapy Treatment Note       Name: Blas BARTLETT [facility]  Clinic Number: [id number]     Therapy Diagnosis:   Encounter Diagnosis   Name Primary?    Mechanical low back pain Yes       Physician: Mando Maier MD     Visit Date: 4/23/2024      Physician Orders: PT Eval and Treat   Medical Diagnosis from Referral: M54.50,G89.29 (ICD-10-CM) - Chronic bilateral low back pain without sciatica  Evaluation Date: 4/11/2024  Authorization Period Expiration: 04/01/2025  Plan of Care Expiration: 7/11/2024  Progress Note Due: 5/11/2024  Date of Surgery: n/a  Visit # / Visits authorized: 3/16    FOTO: 1/ 3     Precautions: Standard, DM, visual deficits related to brain mass     Time In: 3:10 am  Time Out: 3:45 am  Total Billable Time: 35 minutes (treated by PTA)      PTA Visit #: 1/5         Subjective      Patient reports: he had good response with dry needling. States he is having less pain, and it is more centralized to the middle of the spine. Requests more dry needling today.     He was not compliant with home exercise program.  Response to previous treatment: good response to needling   Functional change: none. Continued aggravating factors include: sitting on a low couch, prolonged sitting with work      Pain: 5/10  Location: bilateral low back     Objective       Objective Measures updated at progress report unless specified.      Treatment      Blas received the treatments listed below:       therapeutic exercises to develop strength, endurance, ROM, flexibility, posture, and core stabilization for 8 minutes including:  LTR 10 x 5" holds  DKTC 3 x 20"   Seated firgure 4 (piriformis stretch) 3 x 30" ea      manual therapy techniques: Joint mobilizations, Manual traction, Myofacial release, and Soft tissue Mobilization were applied to the: LSP for 00 minutes, including:  Dry needling x 14 min - see note by Jennifer Patel, PT     neuromuscular re-education activities to " "improve: Balance, Coordination, Kinesthetic, Sense, Proprioception, and Posture for 19 minutes. The following activities were included:  PPT 10 x 10"  Modified downward dog 10 x 5"    TA x 2 min x 5" holds  TA (PPT today) + BKFO x 2x10   TA (PPT today) + march x 2x10   TA (PPT today) + heel slide x 2x10      therapeutic activities to improve functional performance for 8  minutes, including:  None today.  Added rows, lat pulls, anti rotations nv   + pallof press vs RTB 3 x 10         Patient Education and Home Exercises        Education provided:   - 4/17/24- [facility] on dry needling in conjunction with PT therex program     Written Home Exercises Provided: Patient instructed to cont prior HEP. Exercises were reviewed and Blas was able to demonstrate them prior to the end of the session.  Bozenay demonstrated good  understanding of the education provided. See Electronic Medical Record under Patient Instructions for exercises provided during therapy sessions     Assessment      lBas had good tolerance of treatment today with no adverse effects. Required modification to PPT to help with TA endurance. Also required heavy verbal and tactile cues for proper technique and performance of progression and to limit QL substitutions. PT of Record, Jennifer Patel, PT, DPT performed dry needling after session -- see dry needling note. PT/PTA face to face conference held regarding logistics and initiation of manual therapy of dry needling per pt's good response from last visit. Continue to monitor and progress pt as tolerated.      Blas Is progressing well towards his goals.   Patient prognosis is Good.      Patient will continue to benefit from skilled outpatient physical therapy to address the deficits listed in the problem list box on initial evaluation, provide pt/family education and to maximize pt's level of independence in the home and community environment.      Patient's spiritual, cultural and educational needs considered " and pt agreeable to plan of care and goals.     Anticipated barriers to physical therapy: standard, chronicity of sx, commute to clinic (lives near [facility])      Goals: updated 4/23/2024   Short Term Goals (4 Weeks):   1. Pt will report 20% reduction in pain of the lumbar spine and LE for ease with ADL's (not met, progressing)  2. PT will demonstrate improved trunk strength by a half grade in for ease with upright posture during standing activities. (not met, progressing)  3. Pt will demonstrate improved lumbar spine ROM in all directions by a half grade for ease with bending activities.  (not met, progressing)  Long Term Goals (12 Weeks):   1. Pt will report being independent with HEP for maintenance of improvements gained during therapy sessions (not met, progressing)  2. PT will report 50% reduction of pain of the back and LE for ease with dressing and grooming activities.  (not met, progressing)  3. Pt will demonstrate trunk and extremity strength to 5/5 without the provocation of pain for ease with household chores (not met, progressing)  4. Pt will demonstrate appropriate upright posture without external cueing for ease with work related activities.   (not met, progressing)     Plan      Continue with POC for strength, stabilization.      Jaqueline Lowery, PTA

## 2024-04-24 NOTE — PROGRESS NOTES
Physical Therapist and Physical Therapist Assistant met face to face to discuss patient's treatment plan and progress towards established goals. Pt will be seen by a physical therapist minimally every 6th visit or every 30 days.    Jaqueline Lowery, DEMETRIS  04/23/2024

## 2024-04-30 ENCOUNTER — CLINICAL SUPPORT (OUTPATIENT)
Dept: REHABILITATION | Facility: OTHER | Age: 64
End: 2024-04-30
Payer: COMMERCIAL

## 2024-04-30 DIAGNOSIS — M54.59 MECHANICAL LOW BACK PAIN: Primary | ICD-10-CM

## 2024-04-30 PROCEDURE — 97140 MANUAL THERAPY 1/> REGIONS: CPT | Mod: PN

## 2024-04-30 PROCEDURE — 97112 NEUROMUSCULAR REEDUCATION: CPT | Mod: PN

## 2024-04-30 PROCEDURE — 97530 THERAPEUTIC ACTIVITIES: CPT | Mod: PN

## 2024-04-30 NOTE — PROGRESS NOTES
"[facility]   Physical Therapy Treatment Note       Name: Blas BARTLETT [facility]  Clinic Number: [id number]     Therapy Diagnosis:   Encounter Diagnosis   Name Primary?    Mechanical low back pain Yes       Physician: Mando Maier MD     Visit Date: 4/30/2024      Physician Orders: PT Eval and Treat   Medical Diagnosis from Referral: M54.50,G89.29 (ICD-10-CM) - Chronic bilateral low back pain without sciatica  Evaluation Date: 4/11/2024  Authorization Period Expiration: 04/01/2025  Plan of Care Expiration: 7/11/2024  Progress Note Due: 5/11/2024  Date of Surgery: n/a  Visit # / Visits authorized: 3/16    FOTO: 1/ 3     Precautions: Standard, DM, visual deficits related to brain mass     Time In: 3:45pm  Time Out: 4:32 pm  Total Billable Time: 47 minutes     PTA Visit #: 1/5         Subjective      Patient reports: overall good improvement in sitting tolerance. Able to bend forward to stretch his back and reach for his toes with less c/o LBP.     He was not compliant with home exercise program.  Response to previous treatment: good response to needling   Functional change: none. Continued aggravating factors include: sitting on a low couch, prolonged sitting with work      Pain: 5/10  Location: bilateral low back     Objective       Objective Measures updated at progress report unless specified.      Treatment      Blas received the treatments listed below:       therapeutic exercises to develop strength, endurance, ROM, flexibility, posture, and core stabilization for 00 minutes including:  LTR 10 x 5" holds  DKTC 3 x 20"   Seated firgure 4 (piriformis stretch) 3 x 30" ea      manual therapy techniques: Joint mobilizations, Manual traction, Myofacial release, and Soft tissue Mobilization were applied to the: LSP for 14 minutes, including:  Dry needling x 14 min - see note by Jennifer Patel, PT     neuromuscular re-education activities to improve: Balance, Coordination, Kinesthetic, Sense, " "Proprioception, and Posture for 23 minutes. The following activities were included:    +lore pose 3 x 20"  +cat cow x 10  Modified downward dog 10 x 5" - reviewed for HEP    +90-90 TA x 10 x 5" holds  +90-90 TA with heel tap x 1x10, 1x5  +90-90 TA + leg extensions x 1x10, 1x5  +LLB x 20 x green PB      therapeutic activities to improve functional performance for 10  minutes, including:  Pt edu for updated HEP - deadbugs, heel taps, LLB, pallof press (black TB given)  + pallof press vs RTB 3 x 10   Added rows, lat pulls, anti rotations nv         Patient Education and Home Exercises        Education provided:   - 4/17/24- edu on dry needling in conjunction with PT therex program     Written Home Exercises Provided: Patient instructed to cont prior HEP. Exercises were reviewed and Blas was able to demonstrate them prior to the end of the session.  Blas demonstrated good  understanding of the education provided. See Electronic Medical Record under Patient Instructions for exercises provided during therapy sessions     Assessment      Resumed dry needling with good improvements in soft tissue quality and pain modulation by end of session. Progressed core stabilization program and updated HEP to reflect progressions.    Blas Is progressing well towards his goals.   Patient prognosis is Good.      Patient will continue to benefit from skilled outpatient physical therapy to address the deficits listed in the problem list box on initial evaluation, provide pt/family education and to maximize pt's level of independence in the home and community environment.      Patient's spiritual, cultural and educational needs considered and pt agreeable to plan of care and goals.     Anticipated barriers to physical therapy: standard, chronicity of sx, commute to clinic (lives near [facility])      Goals: updated 4/30/2024   Short Term Goals (4 Weeks):   1. Pt will report 20% reduction in pain of the lumbar spine and LE " for ease with ADL's (not met, progressing)  2. PT will demonstrate improved trunk strength by a half grade in for ease with upright posture during standing activities. (not met, progressing)  3. Pt will demonstrate improved lumbar spine ROM in all directions by a half grade for ease with bending activities.  (not met, progressing)  Long Term Goals (12 Weeks):   1. Pt will report being independent with HEP for maintenance of improvements gained during therapy sessions (not met, progressing)  2. PT will report 50% reduction of pain of the back and LE for ease with dressing and grooming activities.  (not met, progressing)  3. Pt will demonstrate trunk and extremity strength to 5/5 without the provocation of pain for ease with household chores (not met, progressing)  4. Pt will demonstrate appropriate upright posture without external cueing for ease with work related activities.   (not met, progressing)     Plan      Continue with POC for strength, stabilization.      Jennifer Manley, PT

## 2024-04-30 NOTE — PROGRESS NOTES
Physical Therapy Functional Dry Needling Note      Date:  4/30/2024    Name: Blas Alcocer  Clinic Number: [id number]    Therapy Diagnosis:   Encounter Diagnosis   Name Primary?    Mechanical low back pain Yes     Physician: Mando Maier MD    Total Time:  14 min    Subjective      Pt reports: continued R>L lower back pain with sitting, feels low and next to the pelvis.  See note by Jennifer Manley, PT     Pain: 5/10  Location: bilateral low back    Objective      See EMR under MEDIA for written consent provided, signed, dated on 4/30/2024     Palpation Assessment to determine the necessity for Functional Dry Needling TTP: ILL, R>L lower lumbar paraspinals, distal > proximal QL.     Blas received the following manual therapy techniques: dry needling to B lumbar spine with 2-3 in needles with no adverse effects.    Homeostatic points:  1. Deep Radial  2. Greater Auricular  3. Spinal Accessory  4. Saphenous  5. Deep Fibular  6. Tibial  7. Greater Occipital  8. Suprascapular ( infraspinatus)  9. Lateral Antebrachial Cutaneous  10. Sural  11. Lateral Popliteal  12. Superficial Radial  13. Dorsal Scapular  14. Superior Cluneal  15. Posterior Cutaneous L 2 -- NP  16. Inferior Gluteal  17. Lateral Pectoral  18. Ilitotibal  19. Infraorbital  20. Spinous process T7  21. Posterior cutaneous  T6  22. Posterior cutaneous L 5  23. Supraorbital  24. Common fibular    Paravertebral Points:  L4-S1 paraspinals  Defer L2-3    Symptomatic Points:   L4-5 mulitifidi  B QL - distal adjacent to ILL    Assessment      Patient demonstrated appropriate response to Functional Dry Needling. Twitch response with DN to lower multifidi, L4-5 paraspinals, and distal QL. Intramuscular estim used at 2 Hz, level 4.0 for 10 minutes, x 2 channels. good  rhythmical contractions observed with estim to treated muscle groups. Marked improvement in soft tissue quality by end of session.    Modification in pt positioning today to promote PPT, gapping  "at joints, and tension on lower lumbar mms to improve access to needling area. Modifications included pillow under pelvis, foam roll at ankles.      Home Exercises and Patient Education      Education provided:   Purpose, benefits, and potential side effects of dry needling.   Educated pt to use heat following treatment sessions to reduce c/o pain or soreness and to improve circulation to needled sites.   Encouraged pt to continue with HEP to maintain flexibility, ROM, and functional mobility.    Written Handout on response to FDN provided: none provided today.    Blas demonstrated good understanding of the education provided.     See EMR under "Patient Instructions" for exercises provided.    Plan      Monitor response to Functional Dry Needling. Continue with Functional Dry Needling in POC as appropriate.       Jennifer Manley, PT  4/30/2024              "

## 2024-05-07 ENCOUNTER — CLINICAL SUPPORT (OUTPATIENT)
Dept: REHABILITATION | Facility: OTHER | Age: 64
End: 2024-05-07
Payer: COMMERCIAL

## 2024-05-07 DIAGNOSIS — M54.59 MECHANICAL LOW BACK PAIN: Primary | ICD-10-CM

## 2024-05-07 PROCEDURE — 97140 MANUAL THERAPY 1/> REGIONS: CPT | Mod: PN

## 2024-05-07 PROCEDURE — 97112 NEUROMUSCULAR REEDUCATION: CPT | Mod: PN

## 2024-05-07 PROCEDURE — 97530 THERAPEUTIC ACTIVITIES: CPT | Mod: PN

## 2024-05-07 NOTE — PROGRESS NOTES
"[facility]   Physical Therapy Treatment Note       Name: Blas BARTLETT [facility]  Clinic Number: [id number]     Therapy Diagnosis:   Encounter Diagnosis   Name Primary?    Mechanical low back pain Yes       Physician: Mando Maier MD     Visit Date: 5/9/2024      Physician Orders: PT Eval and Treat   Medical Diagnosis from Referral: M54.50,G89.29 (ICD-10-CM) - Chronic bilateral low back pain without sciatica  Evaluation Date: 4/11/2024  Authorization Period Expiration: 04/01/2025  Plan of Care Expiration: 7/11/2024  Progress Note Due: 5/11/2024  Date of Surgery: n/a  Visit # / Visits authorized: 5/16    FOTO: 1/ 3     Precautions: Standard, DM, visual deficits related to brain mass     Time In: 4:00pm -- late for appt  Time Out: 4:55 pm  Total Billable Time: 55 minutes     PTA Visit #: 1/5         Subjective      Patient reports: did a lot gardening and yard work Friday and over the weekend. Said he had increased pain Friday and Saturday night, and by Sunday it resolved. Feeling good today. Prolonged driving to [place] on Saturday increased discomfort in the low back.    He was not compliant with home exercise program.  Response to previous treatment: good response to needling   Functional change: none. Continued aggravating factors include: sitting on a low couch, prolonged sitting with work      Pain: 0/10  Location: bilateral low back     Objective       Objective Measures updated at progress report unless specified.      Treatment      Blas received the treatments listed below:       therapeutic exercises to develop strength, endurance, ROM, flexibility, posture, and core stabilization for 00 minutes including:  LTR 10 x 5" holds  DKTC 3 x 20"   Seated firgure 4 (piriformis stretch) 3 x 30" ea      manual therapy techniques: Joint mobilizations, Manual traction, Myofacial release, and Soft tissue Mobilization were applied to the: LSP for 10 minutes, including:  Dry needling x 10 min - " "see note by Jennifer Patel, PT     neuromuscular re-education activities to improve: Balance, Coordination, Kinesthetic, Sense, Proprioception, and Posture for 20 minutes. The following activities were included:    LLB x 3x10 x green PB  90-90 TA x 15 x 5" holds -  with black PB  90-90 TA + leg extensions x 5x5    +modified side plank + clam: x 20  +modified fwd planks 3 x 30"      therapeutic activities to improve functional performance for 25  minutes, including:  +dynamic warm up --- nv   Suitcase carry   Ed[place]s carry   Rack carry    OH carry  Side stepping with B arm flex (2# DB)    +Reverse hyperextensions - nv?  +Reverse hyperextension holds with hip abd pulses - nv?  pallof press x 2 x 15 x red power band B   +Freemotion SLS with contralat row x 3 x 8 x 10#  +freemotion anti rotation SA punches x 3x10 x 10#  +anti rotations x 20 B x red power band  +KB deadlifts (step taps) x 20 x 20# to 12" plyo box -- reduce step height nv          Patient Education and Home Exercises        Education provided:   - 4/17/24- [facility] on dry needling in conjunction with PT therex program     Written Home Exercises Provided: Patient instructed to cont prior HEP. Exercises were reviewed and Blas was able to demonstrate them prior to the end of the session.  Blas demonstrated good  understanding of the education provided. See Electronic Medical Record under Patient Instructions for exercises provided during therapy sessions     Assessment      Progressed functional strength and stabilization program today. Good tolerance overall with appropriate training effect noted and marked fatigue by end of session. Intermittent VC for TA activation. Resumed dry needling but with use of Intramuscular estim today; good improvements in soft tissue quality by end of MPT session.  Blas Is progressing well towards his goals.   Patient prognosis is Good.      Patient will continue to benefit from skilled outpatient physical therapy to address the " deficits listed in the problem list box on initial evaluation, provide pt/family education and to maximize pt's level of independence in the home and community environment.      Patient's spiritual, cultural and educational needs considered and pt agreeable to plan of care and goals.     Anticipated barriers to physical therapy: standard, chronicity of sx, commute to clinic (lives near [facility])      Goals: updated 5/9/2024   Short Term Goals (4 Weeks):   1. Pt will report 20% reduction in pain of the lumbar spine and LE for ease with ADL's (not met, progressing)  2. PT will demonstrate improved trunk strength by a half grade in for ease with upright posture during standing activities. (not met, progressing)  3. Pt will demonstrate improved lumbar spine ROM in all directions by a half grade for ease with bending activities.  (not met, progressing)  Long Term Goals (12 Weeks):   1. Pt will report being independent with HEP for maintenance of improvements gained during therapy sessions (not met, progressing)  2. PT will report 50% reduction of pain of the back and LE for ease with dressing and grooming activities.  (not met, progressing)  3. Pt will demonstrate trunk and extremity strength to 5/5 without the provocation of pain for ease with household chores (not met, progressing)  4. Pt will demonstrate appropriate upright posture without external cueing for ease with work related activities.   (not met, progressing)     Plan      Continue with POC for strength, stabilization.      Jennifer Manley, PT

## 2024-05-09 NOTE — PROGRESS NOTES
Physical Therapy Functional Dry Needling Note      Date:  5/7/2024    Name: Blas Alcocer  Clinic Number: [id number]    Therapy Diagnosis:   Encounter Diagnosis   Name Primary?    Mechanical low back pain Yes     Physician: Mando Maier MD    Total Time:  10 min    Subjective      Pt reports: continued R>L lower back pain with sitting, feels low and next to the pelvis.  See note by Jennifer Manley, PT     Pain: 5/10  Location: bilateral low back    Objective      See EMR under MEDIA for written consent provided, signed, dated on 5/9/2024     Palpation Assessment to determine the necessity for Functional Dry Needling TTP: ILL, R>L lower lumbar paraspinals, distal > proximal QL.     Blas received the following manual therapy techniques: dry needling to B lumbar spine with 2-3 in needles with no adverse effects.    Homeostatic points:  1. Deep Radial  2. Greater Auricular  3. Spinal Accessory  4. Saphenous  5. Deep Fibular  6. Tibial  7. Greater Occipital  8. Suprascapular ( infraspinatus)  9. Lateral Antebrachial Cutaneous  10. Sural  11. Lateral Popliteal  12. Superficial Radial  13. Dorsal Scapular  14. Superior Cluneal  15. Posterior Cutaneous L 2 -- NP  16. Inferior Gluteal  17. Lateral Pectoral  18. Ilitotibal  19. Infraorbital  20. Spinous process T7  21. Posterior cutaneous  T6  22. Posterior cutaneous L 5  23. Supraorbital  24. Common fibular    Paravertebral Points:  L4-S1 paraspinals  Defer L2-3    Symptomatic Points:   L4-5 mulitifidi  B QL - distal adjacent to ILL    Assessment      Patient demonstrated appropriate response to Functional Dry Needling. Twitch response with DN to lower multifidi, L4-5 paraspinals, and distal QL. Intramuscular estim used at 2 Hz, level 4.0 for 10 minutes, x 2 channels. good  rhythmical contractions observed with estim to treated muscle groups. Marked improvement in soft tissue quality by end of session.    Modification in pt positioning today to promote PPT, gapping  "at joints, and tension on lower lumbar mms to improve access to needling area. Modifications included pillow under pelvis, foam roll at ankles.      Home Exercises and Patient Education      Education provided:   Purpose, benefits, and potential side effects of dry needling.   Educated pt to use heat following treatment sessions to reduce c/o pain or soreness and to improve circulation to needled sites.   Encouraged pt to continue with HEP to maintain flexibility, ROM, and functional mobility.    Written Handout on response to FDN provided: none provided today.    Blas demonstrated good understanding of the education provided.     See EMR under "Patient Instructions" for exercises provided.    Plan      Monitor response to Functional Dry Needling. Continue with Functional Dry Needling in POC as appropriate.       Jennifer Manley, PT  5/9/2024                "

## 2024-05-14 ENCOUNTER — CLINICAL SUPPORT (OUTPATIENT)
Dept: REHABILITATION | Facility: OTHER | Age: 64
End: 2024-05-14
Payer: COMMERCIAL

## 2024-05-14 DIAGNOSIS — M54.59 MECHANICAL LOW BACK PAIN: Primary | ICD-10-CM

## 2024-05-14 PROCEDURE — 97530 THERAPEUTIC ACTIVITIES: CPT | Mod: PN

## 2024-05-14 PROCEDURE — 97140 MANUAL THERAPY 1/> REGIONS: CPT | Mod: PN

## 2024-05-14 PROCEDURE — 97112 NEUROMUSCULAR REEDUCATION: CPT | Mod: PN

## 2024-05-14 NOTE — PROGRESS NOTES
"[facility]   Physical Therapy Treatment Note       Name: Blas Alcocer  Clinic Number: [id number]     Therapy Diagnosis:   Encounter Diagnosis   Name Primary?    Mechanical low back pain Yes         Physician: Mando Maier MD     Visit Date: 5/22/2024      Physician Orders: PT Eval and Treat   Medical Diagnosis from Referral: M54.50,G89.29 (ICD-10-CM) - Chronic bilateral low back pain without sciatica  Evaluation Date: 4/11/2024  Authorization Period Expiration: 04/01/2025  Plan of Care Expiration: 7/11/2024  Progress Note Due: 5/11/2024  Date of Surgery: n/a  Visit # / Visits authorized: 7/16    FOTO: 1/ 3     Precautions: Standard, DM, visual deficits related to brain mass     Time In: 3:45pm  Time Out: 4:35 pm  Total Billable Time: 50 minutes     PTA Visit #: 1/5         Subjective      Patient reports: overall feeling great. No longer has pain. Able to do all things at home and work without difficulty or pain. Wants dry needling one last time but says that he would like to be done with PT today. Will plan on continuing exercising at home.    He was not compliant with home exercise program.  Response to previous treatment: good response to needling   Functional change: none. Continued aggravating factors include: sitting on a low couch, prolonged sitting with work      Pain: 0/10  Location: bilateral low back     Objective       Objective Measures updated at progress report unless specified.     5/14/2024    Trunk AROM: WNL all directions  Hip ROM: WNL  Trunk strength: extensors = fair+, abdominals = fair  Hip strength: grossly 5/5     Treatment      Blas received the treatments listed below:       therapeutic exercises to develop strength, endurance, ROM, flexibility, posture, and core stabilization for 00 minutes including:  LTR 10 x 5" holds  DKTC 3 x 20"   Seated firgure 4 (piriformis stretch) 3 x 30" ea      manual therapy techniques: Joint mobilizations, Manual traction, " "Myofacial release, and Soft tissue Mobilization were applied to the: LSP for 10 minutes, including:  Dry needling x 10 min - see note by Jennifer Patel, PT     neuromuscular re-education activities to improve: Balance, Coordination, Kinesthetic, Sense, Proprioception, and Posture for 15 minutes. The following activities were included:    LLB x 3x10 x green PB  90-90 TA x 15 x 5" holds -  with black PB  90-90 TA + leg extensions x 5x5  +modified side plank + clam: x 20  +modified fwd planks 3 x 30"      therapeutic activities to improve functional performance for 25  minutes, including:  Reassessment + pt edu on POC and discharge plan  pallof press x 2 x 15 x red power band B   Freemotion SLS with contralat row x 3 x 8 x 10#  KB deadlifts (step taps) x 20 x 20# to 12" plyo box -- reduce step height nv  Pt edu on updated HEP to include:  Deadbugs x3x10  Side plank with clam x3x10  Fwd plank x3x10  Long leg bridge on stability ball x3x10   Anti rotation x 4x10 x GTB  SLS with milagro rows x 4x10 x GTB  Step ups x2x15       Patient Education and Home Exercises        Education provided:   - 4/17/24- edu on dry needling in conjunction with PT therex program  -5/14/2024 - updated HEP     Written Home Exercises Provided: Patient instructed to cont prior HEP. Exercises were reviewed and Blas was able to demonstrate them prior to the end of the session.  Blas demonstrated good  understanding of the education provided. See Electronic Medical Record under Patient Instructions for exercises provided during therapy sessions     Assessment     Resumed dry needling per pt request with good tolerance and marked improvement in overall mobility, soft tissue quality. Pt presents with normalied ROM, flexibility, and strength of trunk and hips to allow for increased ease with ADLs without pain flare ups.  Pt is independent with HEP and demonstrates good return technique. Pt no longer has pain or difficulty with functional activities and " has returned to PLOF. Pt has progressed well and has met 7 of 7 therapeutic goals. Pt no longer requires skilled PT. Recommend D/C from skilled care.        Blas Is progressing well towards his goals.   Patient prognosis is Good.      Patient will continue to benefit from skilled outpatient physical therapy to address the deficits listed in the problem list box on initial evaluation, provide pt/family education and to maximize pt's level of independence in the home and community environment.      Patient's spiritual, cultural and educational needs considered and pt agreeable to plan of care and goals.     Anticipated barriers to physical therapy: standard, chronicity of sx, commute to clinic (lives near [facility])      Goals: updated 5/22/2024   Short Term Goals (4 Weeks):   1. Pt will report 20% reduction in pain of the lumbar spine and LE for ease with ADL's (met)  2. PT will demonstrate improved trunk strength by a half grade in for ease with upright posture during standing activities. (met)  3. Pt will demonstrate improved lumbar spine ROM in all directions by a half grade for ease with bending activities.  (met)  Long Term Goals (12 Weeks):   1. Pt will report being independent with HEP for maintenance of improvements gained during therapy sessions (met)  2. PT will report 50% reduction of pain of the back and LE for ease with dressing and grooming activities.  (met)  3. Pt will demonstrate trunk and extremity strength to 5/5 without the provocation of pain for ease with household chores ( met)  4. Pt will demonstrate appropriate upright posture without external cueing for ease with work related activities.   (met)     Plan      D/c with HEP. Advised pt to F/U with MD if sx return.      Jennifer Manley, PT

## 2024-05-14 NOTE — PROGRESS NOTES
Physical Therapy Functional Dry Needling Note      Date:  5/14/2024    Name: Blas Alcocer  Clinic Number: [id number]    Therapy Diagnosis:   No diagnosis found.    Physician: Mando Maier MD    Total Time:  10 min    Subjective      Pt reports: continued R>L lower back pain with sitting, feels low and next to the pelvis.  See note by Jennifer Manley, PT     Pain: 5/10  Location: bilateral low back    Objective      See EMR under MEDIA for written consent provided, signed, dated on 5/14/2024     Palpation Assessment to determine the necessity for Functional Dry Needling TTP: ILL, R>L lower lumbar paraspinals, distal > proximal QL.     Blas received the following manual therapy techniques: dry needling to B lumbar spine with 2-3 in needles with no adverse effects.    Homeostatic points:  1. Deep Radial  2. Greater Auricular  3. Spinal Accessory  4. Saphenous  5. Deep Fibular  6. Tibial  7. Greater Occipital  8. Suprascapular ( infraspinatus)  9. Lateral Antebrachial Cutaneous  10. Sural  11. Lateral Popliteal  12. Superficial Radial  13. Dorsal Scapular  14. Superior Cluneal  15. Posterior Cutaneous L 2 -- NP  16. Inferior Gluteal  17. Lateral Pectoral  18. Ilitotibal  19. Infraorbital  20. Spinous process T7  21. Posterior cutaneous  T6  22. Posterior cutaneous L 5  23. Supraorbital  24. Common fibular    Paravertebral Points:  L4-S1 paraspinals  Defer L2-3    Symptomatic Points:   L4-5 mulitifidi  B QL - distal adjacent to ILL    Assessment      Patient demonstrated appropriate response to Functional Dry Needling. Twitch response with DN to lower multifidi, L4-5 paraspinals, and distal QL. Intramuscular estim used at 2 Hz, level 4.0 for 10 minutes, x 2 channels. good  rhythmical contractions observed with estim to treated muscle groups. Marked improvement in soft tissue quality by end of session.    Modification in pt positioning today to promote PPT, gapping at joints, and tension on lower lumbar mms to  "improve access to needling area. Modifications included pillow under pelvis, foam roll at ankles.      Home Exercises and Patient Education      Education provided:   Purpose, benefits, and potential side effects of dry needling.   Educated pt to use heat following treatment sessions to reduce c/o pain or soreness and to improve circulation to needled sites.   Encouraged pt to continue with HEP to maintain flexibility, ROM, and functional mobility.    Written Handout on response to FDN provided: none provided today.    Blas demonstrated good understanding of the education provided.     See EMR under "Patient Instructions" for exercises provided.    Plan      Monitor response to Functional Dry Needling. Continue with Functional Dry Needling in POC as appropriate.       Jennifer Manley, PT  5/14/2024                  "

## 2024-05-22 ENCOUNTER — PATIENT MESSAGE (OUTPATIENT)
Dept: ENDOCRINOLOGY | Facility: CLINIC | Age: 64
End: 2024-05-22
Payer: COMMERCIAL

## 2024-06-10 DIAGNOSIS — I10 ESSENTIAL HYPERTENSION: ICD-10-CM

## 2024-06-10 RX ORDER — LISINOPRIL AND HYDROCHLOROTHIAZIDE 12.5; 2 MG/1; MG/1
1 TABLET ORAL
Qty: 90 TABLET | Refills: 0 | Status: SHIPPED | OUTPATIENT
Start: 2024-06-10

## 2024-06-11 NOTE — TELEPHONE ENCOUNTER
Provider Staff:  Action required for this patient    Requires labs      Please see care gap opportunities below in Care Due Message.    Thanks!  [facility]     Appointments      Date Provider   Last Visit   8/15/2023 Reddy Kong MD   Next Visit   Visit date not found Reddy Kong MD     Refill Decision Note   Blas Alcocer  is requesting a refill authorization.  Brief Assessment and Rationale for Refill:  Approve     Medication Therapy Plan:         Comments:     Note composed:11:56 PM 06/10/2024

## 2024-06-11 NOTE — TELEPHONE ENCOUNTER
Care Due:                  Date            Visit Type   Department     Provider  --------------------------------------------------------------------------------                                [facility] FAMILY                              PRIMARY      MEDICINE/INTERN  Last Visit: 08-      CARE ([facility])   [facility]         Reddy Kong  Next Visit: None Scheduled  None         None Found                                                            Last  Test          Frequency    Reason                     Performed    Due Date  --------------------------------------------------------------------------------    CMP.........  12 months..  lisinopriL-hydrochlorothi  Not Found    Overdue                             azide....................    [employer] Embedded Care Due Messages. Reference number: [id number].   6/10/2024 11:52:24 PM CDT

## 2024-07-28 NOTE — ASSESSMENT & PLAN NOTE
- history of prediabetes for many years, now with worsening hyperglycemia weight gain in 2/2022  - treat as DM given longstand hx of prediabetes  - A1C is improving on therapy  - Increase Trulicity to 3.0mg once a week injection   - discussed with patient he is monitoring his diet  - Repeat lab work in 6 months for monitoring   81M with HTN, HLD, CKDIII, BPH, Tachybrady syndrome, recent knee surgery (2022), hip surgery, gluteus tendon surgery, chronic lumbar radiculopathy, MADELIN, who presented for elective surgery after discovery of brain mass. s/p resection on July 23rd 2024.

## 2024-08-01 ENCOUNTER — PATIENT MESSAGE (OUTPATIENT)
Dept: OTHER | Facility: OTHER | Age: 64
End: 2024-08-01
Payer: COMMERCIAL

## 2024-08-12 ENCOUNTER — PATIENT MESSAGE (OUTPATIENT)
Dept: ENDOCRINOLOGY | Facility: CLINIC | Age: 64
End: 2024-08-12
Payer: COMMERCIAL

## 2024-08-13 ENCOUNTER — PATIENT MESSAGE (OUTPATIENT)
Dept: OTHER | Facility: OTHER | Age: 64
End: 2024-08-13
Payer: COMMERCIAL

## 2024-08-13 ENCOUNTER — LAB VISIT (OUTPATIENT)
Dept: LAB | Facility: HOSPITAL | Age: 64
End: 2024-08-13
Attending: HOSPITALIST
Payer: COMMERCIAL

## 2024-08-13 DIAGNOSIS — E55.9 HYPOVITAMINOSIS D: ICD-10-CM

## 2024-08-13 DIAGNOSIS — E05.90 SUBCLINICAL HYPERTHYROIDISM: ICD-10-CM

## 2024-08-13 DIAGNOSIS — E11.65 TYPE 2 DIABETES MELLITUS WITH HYPERGLYCEMIA, WITHOUT LONG-TERM CURRENT USE OF INSULIN: ICD-10-CM

## 2024-08-13 LAB
25(OH)D3+25(OH)D2 SERPL-MCNC: 40 NG/ML (ref 30–96)
ANION GAP SERPL CALC-SCNC: 9 MMOL/L (ref 8–16)
BUN SERPL-MCNC: 13 MG/DL (ref 8–23)
CALCIUM SERPL-MCNC: 9.6 MG/DL (ref 8.7–10.5)
CHLORIDE SERPL-SCNC: 104 MMOL/L (ref 95–110)
CO2 SERPL-SCNC: 26 MMOL/L (ref 23–29)
CREAT SERPL-MCNC: 0.8 MG/DL (ref 0.5–1.4)
EST. GFR  (NO RACE VARIABLE): >60 ML/MIN/1.73 M^2
ESTIMATED AVG GLUCOSE: 100 MG/DL (ref 68–131)
GLUCOSE SERPL-MCNC: 99 MG/DL (ref 70–110)
HBA1C MFR BLD: 5.1 % (ref 4–5.6)
POTASSIUM SERPL-SCNC: 3.7 MMOL/L (ref 3.5–5.1)
SODIUM SERPL-SCNC: 139 MMOL/L (ref 136–145)
T4 FREE SERPL-MCNC: 0.9 NG/DL (ref 0.71–1.51)
TSH SERPL DL<=0.005 MIU/L-ACNC: 0.42 UIU/ML (ref 0.4–4)

## 2024-08-13 PROCEDURE — 84439 ASSAY OF FREE THYROXINE: CPT | Performed by: HOSPITALIST

## 2024-08-13 PROCEDURE — 83036 HEMOGLOBIN GLYCOSYLATED A1C: CPT | Performed by: HOSPITALIST

## 2024-08-13 PROCEDURE — 84443 ASSAY THYROID STIM HORMONE: CPT | Performed by: HOSPITALIST

## 2024-08-13 PROCEDURE — 80048 BASIC METABOLIC PNL TOTAL CA: CPT | Performed by: HOSPITALIST

## 2024-08-13 PROCEDURE — 82306 VITAMIN D 25 HYDROXY: CPT | Performed by: HOSPITALIST

## 2024-08-13 PROCEDURE — 36415 COLL VENOUS BLD VENIPUNCTURE: CPT | Mod: PO | Performed by: HOSPITALIST

## 2024-08-19 ENCOUNTER — OFFICE VISIT (OUTPATIENT)
Dept: ENDOCRINOLOGY | Facility: CLINIC | Age: 64
End: 2024-08-19
Payer: COMMERCIAL

## 2024-08-19 VITALS
WEIGHT: 174.81 LBS | HEART RATE: 69 BPM | SYSTOLIC BLOOD PRESSURE: 100 MMHG | DIASTOLIC BLOOD PRESSURE: 62 MMHG | HEIGHT: 69 IN | BODY MASS INDEX: 25.89 KG/M2

## 2024-08-19 DIAGNOSIS — E05.90 SUBCLINICAL HYPERTHYROIDISM: ICD-10-CM

## 2024-08-19 DIAGNOSIS — E66.09 CLASS 1 OBESITY DUE TO EXCESS CALORIES WITH SERIOUS COMORBIDITY AND BODY MASS INDEX (BMI) OF 30.0 TO 30.9 IN ADULT: ICD-10-CM

## 2024-08-19 DIAGNOSIS — E11.65 TYPE 2 DIABETES MELLITUS WITH HYPERGLYCEMIA, WITHOUT LONG-TERM CURRENT USE OF INSULIN: Primary | ICD-10-CM

## 2024-08-19 DIAGNOSIS — I10 PRIMARY HYPERTENSION: ICD-10-CM

## 2024-08-19 DIAGNOSIS — E55.9 HYPOVITAMINOSIS D: ICD-10-CM

## 2024-08-19 PROCEDURE — 3044F HG A1C LEVEL LT 7.0%: CPT | Mod: CPTII,S$GLB,, | Performed by: HOSPITALIST

## 2024-08-19 PROCEDURE — 3078F DIAST BP <80 MM HG: CPT | Mod: CPTII,S$GLB,, | Performed by: HOSPITALIST

## 2024-08-19 PROCEDURE — 99214 OFFICE O/P EST MOD 30 MIN: CPT | Mod: S$GLB,,, | Performed by: HOSPITALIST

## 2024-08-19 PROCEDURE — 3072F LOW RISK FOR RETINOPATHY: CPT | Mod: CPTII,S$GLB,, | Performed by: HOSPITALIST

## 2024-08-19 PROCEDURE — 3008F BODY MASS INDEX DOCD: CPT | Mod: CPTII,S$GLB,, | Performed by: HOSPITALIST

## 2024-08-19 PROCEDURE — 1159F MED LIST DOCD IN RCRD: CPT | Mod: CPTII,S$GLB,, | Performed by: HOSPITALIST

## 2024-08-19 PROCEDURE — 1160F RVW MEDS BY RX/DR IN RCRD: CPT | Mod: CPTII,S$GLB,, | Performed by: HOSPITALIST

## 2024-08-19 PROCEDURE — 4010F ACE/ARB THERAPY RXD/TAKEN: CPT | Mod: CPTII,S$GLB,, | Performed by: HOSPITALIST

## 2024-08-19 PROCEDURE — 99999 PR PBB SHADOW E&M-EST. PATIENT-LVL III: CPT | Mod: PBBFAC,,, | Performed by: HOSPITALIST

## 2024-08-19 PROCEDURE — 3074F SYST BP LT 130 MM HG: CPT | Mod: CPTII,S$GLB,, | Performed by: HOSPITALIST

## 2024-08-19 RX ORDER — DULAGLUTIDE 4.5 MG/.5ML
4.5 INJECTION, SOLUTION SUBCUTANEOUS
Qty: 12 PEN | Refills: 3 | Status: SHIPPED | OUTPATIENT
Start: 2024-08-19 | End: 2025-08-19

## 2024-08-19 RX ORDER — AMLODIPINE BESYLATE 10 MG/1
10 TABLET ORAL
Qty: 90 TABLET | Refills: 0 | Status: SHIPPED | OUTPATIENT
Start: 2024-08-19

## 2024-08-19 RX ORDER — DULAGLUTIDE 3 MG/.5ML
3 INJECTION, SOLUTION SUBCUTANEOUS
Qty: 12 PEN | Refills: 3 | Status: CANCELLED | OUTPATIENT
Start: 2024-08-19 | End: 2025-08-19

## 2024-08-19 NOTE — ASSESSMENT & PLAN NOTE
- patient with longstanding history of low TSH with normal free T4,  since 2015  - idiopathic given negative TPO/TSI antibodies  - STABLE  - Stable thyroid function repeat lab work every 6 months

## 2024-08-19 NOTE — PROGRESS NOTES
"Subjective:      Patient ID: Blas Alcocer is a 64 y.o. male presented to [facility] on 8/19/2024.  Chief Complaint:  Diabetes    History of Present Illness: Blas Alcocer is a 64 y.o. 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    Still with weight loss, Now back on exercise regimen.    Tolerating Trulicity 3.0 mg once a week, Getting it from mail-in pharmacy  Weight today in clinic 174< , previous office visit 07/2022: 176 <181, 182 lb, 188 lb, started Trulicity @ 200lb  Patient reports his weight at home to be about 165.  He is very happy with the weight  No sign of palpitation, tremors.  No goiter  Walking 4-5 miles a day (4x a week), golf 1x a week  Stretching more      1) Type 2 diabetes/prediabetes  - Diagnosed w/ DM:  prediabetes since 2019 with recent hyperglycemia on fasting blood work  - Patient is also interested in weight loss  - Does have have glucometer    - Current meds:  Trulicity 3 mg once a week  - Weight trend: decrease noted  - Diabetes Education: No  - Diabetes Related Hospitalization:  No  - Hx of pancreatitis, hx of thyroid cancer: No  - Family history of diabetes: Yes  - Occupation: working    Eye exam current (within one year): yes, DR: no  Reports cuts or ulcers on feet:   Denies  Statin: Not taking, ACE/ARB: Taking    Diabetes lab work  Lab Results   Component Value Date    HGBA1C 5.1 08/13/2024    HGBA1C 5.2 02/15/2024    HGBA1C 5.3 08/01/2023    HGBA1C 5.4 01/11/2023     No results found for: "CPEPTIDE", "GLUTAMICACID", "ISLETCELLANT"   No results found for: "FRUCTOSAMINE"  Lab Results   Component Value Date    MICALBCREAT 9.7 12/18/2023     No results found for: "JBVCPMGM38"    Diabetes Management Status: Reviewed this " office visit  Screening or Prevention Patient's value Goal Complete/Controlled?   Lipid profile : 12/18/2023 Annually No     Dilated retinal exam : 09/07/2023 Annually Yes     Foot exam   Most Recent Foot Exam Date: Not Found Annually No          2) Subclinical hyperthyroidism  - Abnormal thyroid lab: Low TSH, normal free T4, subclinical hyperthyroidism?>> longstanding history prior to gamma knife  - First diagnosed: 2015?, results shows decrease in TSH from 3096-7305.  Possibly worsen  post Gamma Knife treatment 2018  - Hormonal workup done showed normal antibodies:  Negative TSI, TPO  - No other pituitary issues:  Recent blood work low suspicion for hypopituitarism  - No family hx   - we have been monitoring lab work.  Given negative antibody.  Normal TSH now 2024 normal T4  - Patient denies any symptoms of hyperthyroidism including palpitation, tremors, weight loss  - Patient reports fatigue off and on otherwise does have issue with some weight gain. Patient denies headache, blurred vision  - No family history of thyroid disorder that he can recall  - Besides from Gamma Knife no other radiation exposure  - In the past had testosterone evaluated by PCP:  Normal, of note prior to gamma knife  - Did have 2 COVID infection asymptomatic per patient last 12/21  - Denies any use of:  Amiodarone/lithium  - No new medication.  Compliant with blood pressure medication amlodipine, lisinopril  - Herbal/Vitamin/Supplement:  Denies take Kelp, Iodine, Biotin, Selenium, Sea sarabia, Bladder wrack    Thyroid lab work  Lab Results   Component Value Date    TSH 0.417 08/13/2024    TSH 0.515 08/01/2023    TSH 0.344 (L) 01/11/2023    TSH 0.317 (L) 07/11/2022    FREET4 0.90 08/13/2024    FREET4 1.00 08/01/2023    FREET4 1.01 01/11/2023    FREET4 0.96 07/11/2022    V3KTFXA 106 04/12/2022    Z0JPQEC 114 07/07/2015      Antibodies  Lab Results   Component Value Date    THYROPEROXID <6.0 04/12/2022    THYROIDSTIMI <0.10 04/12/2022  "        Reviewed past surgical, medical, family, social history and updated as appropriate.  Review of Systems: see HPI above    Objective:   /62   Pulse 69   Ht 5' 9" (1.753 m)   Wt 79.3 kg (174 lb 12.8 oz)   BMI 25.81 kg/m²     Body mass index is 25.81 kg/m².  Vital signs reviewed    Physical Exam  Vitals and nursing note reviewed.   Constitutional:       General: He is not in acute distress.     Appearance: Normal appearance. He is well-developed. He is obese. He is not toxic-appearing.   Neck:      Thyroid: No thyromegaly.      Comments: No goiter noted  Cardiovascular:      Heart sounds: Normal heart sounds.   Pulmonary:      Effort: Pulmonary effort is normal. No respiratory distress.   Abdominal:      Tenderness: There is no abdominal tenderness.   Musculoskeletal:         General: No deformity. Normal range of motion.      Cervical back: Normal range of motion.   Skin:     Findings: No bruising.   Neurological:      Mental Status: He is alert and oriented to person, place, and time.   Psychiatric:         Mood and Affect: Mood normal.       Lab Reviewed:  See results in subjective  Lab Results   Component Value Date    HGBA1C 5.1 08/13/2024     Lab Results   Component Value Date    CHOL 164 12/18/2023    HDL 43 12/18/2023    LDLCALC 108.0 12/18/2023    TRIG 65 12/18/2023    CHOLHDL 26.2 12/18/2023     Lab Results   Component Value Date     08/13/2024    K 3.7 08/13/2024     08/13/2024    CO2 26 08/13/2024    GLU 99 08/13/2024    BUN 13 08/13/2024    CREATININE 0.8 08/13/2024    CALCIUM 9.6 08/13/2024    PROT 6.8 04/12/2022    ALBUMIN 4.0 04/12/2022    ALBUMIN 4.3 04/12/2022    BILITOT 1.0 04/12/2022    ALKPHOS 47 (L) 04/12/2022    AST 24 04/12/2022    ALT 29 04/12/2022    ANIONGAP 9 08/13/2024    ESTGFRAFRICA >60 07/11/2022    EGFRNONAA >60 07/11/2022    TSH 0.417 08/13/2024    [id number] 40 08/13/2024     Assessment     1. Type 2 diabetes mellitus with hyperglycemia, without " long-term current use of insulin  TRULICITY 4.5 mg/0.5 mL pen injector    HEMOGLOBIN A1C    Basic Metabolic Panel      2. Subclinical hyperthyroidism  TSH      3. Class 1 obesity due to excess calories with serious comorbidity and body mass index (BMI) of 30.0 to 30.9 in adult        4. Hypovitaminosis D          Plan     Type 2 diabetes mellitus with hyperglycemia, without long-term current use of insulin  - History of prediabetes for many years, now with worsening hyperglycemia weight gain in 2/2022  - Treat as DM given longstand hx of prediabetes, insulin resistant hyperglycemia  - Diabetes is AT goal, given the most recent A1C reviewed 8/19/2024  - Reviewed goals of therapy: to achieve the best control possible without hypoglycemia, with a continue target A1C <7%.  - A1C is improving on therapy  - Increase Trulicity 4.5 mg once a week injection >> sent to pharmacy  - Discussed with patient he is monitoring his diet  - Repeat lab work in 6 months for monitoring  - Follow up as scheduled with lab work prior. 6 months in clinic follow-up.      Subclinical hyperthyroidism  - patient with longstanding history of low TSH with normal free T4,  since 2015  - idiopathic given negative TPO/TSI antibodies  - STABLE  - Stable thyroid function repeat lab work every 6 months      Class 1 obesity due to excess calories with serious comorbidity and body mass index (BMI) of 30.0 to 30.9 in adult  - Body mass index is 25.81 kg/m².  - dietary discussion as above  - continue to monitor weight, encourage patient to continue weight loss  - encourage continue exercise    Hypovitaminosis D  - lab work reviewed, low   - advised patient to start  vitamin D3 2000 IU daily   - yearly monitor      Advised patient to follow up with PCP for routine health maintenance care.   RTC in 6 months    Mckinley Romero M.D.  Endocrinology  [facility]  8/19/2024      Disclaimer: This note has been generated in part with the  use of voice-recognition software. There may be typographical errors that have been missed during proof-reading.

## 2024-08-19 NOTE — ASSESSMENT & PLAN NOTE
- Body mass index is 25.81 kg/m².  - dietary discussion as above  - continue to monitor weight, encourage patient to continue weight loss  - encourage continue exercise

## 2024-08-19 NOTE — ASSESSMENT & PLAN NOTE
- History of prediabetes for many years, now with worsening hyperglycemia weight gain in 2/2022  - Treat as DM given longstand hx of prediabetes, insulin resistant hyperglycemia  - Diabetes is AT goal, given the most recent A1C reviewed 8/19/2024  - Reviewed goals of therapy: to achieve the best control possible without hypoglycemia, with a continue target A1C <7%.  - A1C is improving on therapy  - Increase Trulicity 4.5 mg once a week injection >> sent to pharmacy  - Discussed with patient he is monitoring his diet  - Repeat lab work in 6 months for monitoring  - Follow up as scheduled with lab work prior. 6 months in clinic follow-up.

## 2024-08-19 NOTE — TELEPHONE ENCOUNTER
Provider Staff:  Action required for this patient    Requires appointment      Please see care gap opportunities below in Care Due Message.    Thanks!  [facility]     Appointments      Date Provider   Last Visit   8/15/2023 Reddy Kong MD   Next Visit   Visit date not found Reddy Kong MD     Refill Decision Note   Blas Alcocer  is requesting a refill authorization.  Brief Assessment and Rationale for Refill:  Approve     Medication Therapy Plan:         Comments:     Note composed:10:23 AM 08/19/2024

## 2024-08-19 NOTE — TELEPHONE ENCOUNTER
Care Due:                  Date            Visit Type   Department     Provider  --------------------------------------------------------------------------------                                [facility] FAMILY                              PRIMARY      MEDICINE/INTERN  Last Visit: 08-      CARE ([facility])   [facility]         Reddy Kong  Next Visit: None Scheduled  None         None Found                                                            Last  Test          Frequency    Reason                     Performed    Due Date  --------------------------------------------------------------------------------    Office Visit  15 months..  amLODIPine,                08-   11-                             lisinopriL-hydrochlorothi                             azide....................    [employer] Embedded Care Due Messages. Reference number: [id number].   8/19/2024 12:41:41 AM CDT

## 2024-09-05 ENCOUNTER — PATIENT MESSAGE (OUTPATIENT)
Dept: ADMINISTRATIVE | Facility: OTHER | Age: 64
End: 2024-09-05
Payer: COMMERCIAL

## 2024-09-05 ENCOUNTER — TELEPHONE (OUTPATIENT)
Dept: FAMILY MEDICINE | Facility: CLINIC | Age: 64
End: 2024-09-05
Payer: COMMERCIAL

## 2024-09-05 DIAGNOSIS — M25.569 KNEE PAIN, UNSPECIFIED CHRONICITY, UNSPECIFIED LATERALITY: Primary | ICD-10-CM

## 2024-09-05 NOTE — TELEPHONE ENCOUNTER
Spoke w/ PT for more information.    Possible RT knee injury of unknown origin  Soreness x 2 weeks on the inside of knee    WHEN:  Only upon rising after sitting for prolonged periods.    NEGATIVE PAIN:  Walking, working out at the gym.    PREVIOUS ISSUES:  No surgery  No prior injuries  No knee issues    HOME TX:  Ice  Stretching  Tylenol  Exercise     No relief of symptoms with home tx. No improvement in 2 weeks.    Please advise

## 2024-09-06 RX ORDER — DICLOFENAC SODIUM 10 MG/G
2 GEL TOPICAL 4 TIMES DAILY
Qty: 100 G | Refills: 0 | Status: SHIPPED | OUTPATIENT
Start: 2024-09-06

## 2024-09-06 RX ORDER — DICLOFENAC SODIUM 10 MG/G
2 GEL TOPICAL 4 TIMES DAILY
Qty: 100 G | Refills: 0 | Status: SHIPPED | OUTPATIENT
Start: 2024-09-06 | End: 2024-09-06 | Stop reason: SDUPTHER

## 2024-09-06 NOTE — TELEPHONE ENCOUNTER
Spoke w/ PT to notify of Rx at pharmacy. Keep home treatment. Let us know in one week if no relief. PT verbalized understanding.

## 2024-09-09 DIAGNOSIS — I10 ESSENTIAL HYPERTENSION: ICD-10-CM

## 2024-09-09 RX ORDER — LISINOPRIL AND HYDROCHLOROTHIAZIDE 12.5; 2 MG/1; MG/1
1 TABLET ORAL
Qty: 90 TABLET | Refills: 0 | Status: SHIPPED | OUTPATIENT
Start: 2024-09-09

## 2024-09-09 NOTE — TELEPHONE ENCOUNTER
Care Due:                  Date            Visit Type   Department     Provider  --------------------------------------------------------------------------------                                [facility] FAMILY                              PRIMARY      MEDICINE/INTERN  Last Visit: 08-      CARE ([facility])   [facility]         Reddy Kong  Next Visit: None Scheduled  None         None Found                                                            Last  Test          Frequency    Reason                     Performed    Due Date  --------------------------------------------------------------------------------    CBC.........  12 months..  diclofenac...............  Not Found    Overdue    [employer] Embedded Care Due Messages. Reference number: [id number].   9/09/2024 12:25:29 AM CDT

## 2024-09-09 NOTE — TELEPHONE ENCOUNTER
Provider Staff:  Action required for this patient    Requires labs      Please see care gap opportunities below in Care Due Message.    Thanks!  [facility]     Appointments      Date Provider   Last Visit   8/15/2023 Reddy Kong MD   Next Visit   Visit date not found Reddy Kong MD     Refill Decision Note   Blas Alcocer  is requesting a refill authorization.  Brief Assessment and Rationale for Refill:  Approve     Medication Therapy Plan:        Comments:     Note composed:1:34 PM 09/09/2024

## 2024-10-02 ENCOUNTER — OFFICE VISIT (OUTPATIENT)
Dept: FAMILY MEDICINE | Facility: CLINIC | Age: 64
End: 2024-10-02
Payer: COMMERCIAL

## 2024-10-02 ENCOUNTER — HOSPITAL ENCOUNTER (OUTPATIENT)
Dept: RADIOLOGY | Facility: HOSPITAL | Age: 64
Discharge: HOME | End: 2024-10-02
Attending: NURSE PRACTITIONER
Payer: COMMERCIAL

## 2024-10-02 VITALS
HEART RATE: 82 BPM | TEMPERATURE: 98 F | WEIGHT: 174.63 LBS | OXYGEN SATURATION: 96 % | DIASTOLIC BLOOD PRESSURE: 80 MMHG | SYSTOLIC BLOOD PRESSURE: 104 MMHG | BODY MASS INDEX: 25.86 KG/M2 | HEIGHT: 69 IN

## 2024-10-02 DIAGNOSIS — D32.9 MENINGIOMA: ICD-10-CM

## 2024-10-02 DIAGNOSIS — Z00.00 ANNUAL PHYSICAL EXAM: Primary | ICD-10-CM

## 2024-10-02 DIAGNOSIS — Z23 NEEDS FLU SHOT: ICD-10-CM

## 2024-10-02 DIAGNOSIS — E78.1 HYPERTRIGLYCERIDEMIA: ICD-10-CM

## 2024-10-02 DIAGNOSIS — I10 PRIMARY HYPERTENSION: ICD-10-CM

## 2024-10-02 DIAGNOSIS — M17.11 PRIMARY OSTEOARTHRITIS OF RIGHT KNEE: ICD-10-CM

## 2024-10-02 DIAGNOSIS — R97.20 ELEVATED PSA: ICD-10-CM

## 2024-10-02 DIAGNOSIS — E11.65 TYPE 2 DIABETES MELLITUS WITH HYPERGLYCEMIA, WITHOUT LONG-TERM CURRENT USE OF INSULIN: ICD-10-CM

## 2024-10-02 DIAGNOSIS — N40.0 BENIGN PROSTATIC HYPERPLASIA WITHOUT LOWER URINARY TRACT SYMPTOMS: ICD-10-CM

## 2024-10-02 DIAGNOSIS — E55.9 HYPOVITAMINOSIS D: ICD-10-CM

## 2024-10-02 DIAGNOSIS — E05.90 SUBCLINICAL HYPERTHYROIDISM: ICD-10-CM

## 2024-10-02 PROCEDURE — 90471 IMMUNIZATION ADMIN: CPT | Mod: S$GLB,,, | Performed by: NURSE PRACTITIONER

## 2024-10-02 PROCEDURE — 3079F DIAST BP 80-89 MM HG: CPT | Mod: CPTII,S$GLB,, | Performed by: NURSE PRACTITIONER

## 2024-10-02 PROCEDURE — 4010F ACE/ARB THERAPY RXD/TAKEN: CPT | Mod: CPTII,S$GLB,, | Performed by: NURSE PRACTITIONER

## 2024-10-02 PROCEDURE — 90656 IIV3 VACC NO PRSV 0.5 ML IM: CPT | Mod: S$GLB,,, | Performed by: NURSE PRACTITIONER

## 2024-10-02 PROCEDURE — 73562 X-RAY EXAM OF KNEE 3: CPT | Mod: 26,RT,, | Performed by: RADIOLOGY

## 2024-10-02 PROCEDURE — 3074F SYST BP LT 130 MM HG: CPT | Mod: CPTII,S$GLB,, | Performed by: NURSE PRACTITIONER

## 2024-10-02 PROCEDURE — 1159F MED LIST DOCD IN RCRD: CPT | Mod: CPTII,S$GLB,, | Performed by: NURSE PRACTITIONER

## 2024-10-02 PROCEDURE — 99396 PREV VISIT EST AGE 40-64: CPT | Mod: 25,S$GLB,, | Performed by: NURSE PRACTITIONER

## 2024-10-02 PROCEDURE — 99999 PR PBB SHADOW E&M-EST. PATIENT-LVL IV: CPT | Mod: PBBFAC,,, | Performed by: NURSE PRACTITIONER

## 2024-10-02 PROCEDURE — 73560 X-RAY EXAM OF KNEE 1 OR 2: CPT | Mod: TC,FY,LT

## 2024-10-02 PROCEDURE — 3044F HG A1C LEVEL LT 7.0%: CPT | Mod: CPTII,S$GLB,, | Performed by: NURSE PRACTITIONER

## 2024-10-02 PROCEDURE — 3072F LOW RISK FOR RETINOPATHY: CPT | Mod: CPTII,S$GLB,, | Performed by: NURSE PRACTITIONER

## 2024-10-02 PROCEDURE — 73560 X-RAY EXAM OF KNEE 1 OR 2: CPT | Mod: 26,59,LT, | Performed by: RADIOLOGY

## 2024-10-02 PROCEDURE — 1160F RVW MEDS BY RX/DR IN RCRD: CPT | Mod: CPTII,S$GLB,, | Performed by: NURSE PRACTITIONER

## 2024-10-02 PROCEDURE — 3008F BODY MASS INDEX DOCD: CPT | Mod: CPTII,S$GLB,, | Performed by: NURSE PRACTITIONER

## 2024-10-02 NOTE — PROGRESS NOTES
SUBJECTIVE     Chief Complaint   Patient presents with    Annual Exam     HPI  Blas Alcocer is a 64 y.o. male with multiple medical diagnoses as listed in the medical history and problem list that presents for annual exam. Pt has been doing well since his last visit. He has a good appetite and eats well. Reports some weight loss.  He does exercise, 5 days per week. Outdoor cycling 6 miles 3 days per week and walking 5 miles. Some weight training. He sleeps for ~8 hours nightly. Pt does take OTC supplements. He does not have any current stressors. Pt is UTD on age appropriate CA screening. Dental exam is UTD. Eye exam is UTD.     Wt Readings from Last 3 Encounters:   10/02/24 0854 79.2 kg (174 lb 9.7 oz)   08/19/24 0840 79.3 kg (174 lb 12.8 oz)   08/18/23 0928 82.5 kg (181 lb 12.8 oz)      Knee Pain   The incident occurred more than 1 week ago. There was no injury mechanism. The pain is present in the right knee. The quality of the pain is described as aching. The pain is mild. The pain has been Intermittent since onset. Pertinent negatives include no loss of motion, loss of sensation, numbness or tingling. He reports no foreign bodies present. Exacerbated by: immobility.       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/2016    Type 2 diabetes mellitus with hyperglycemia, without long-term current use of insulin 7/18/2022       PAST SURGICAL HISTORY:  Past Surgical History:   Procedure Laterality Date    COLONOSCOPY N/A 3/18/2022    Procedure: COLONOSCOPY;  Surgeon: Farzad Sow MD;  Location: [facility];  Service: Endoscopy;  Laterality: N/A;    PROSTATE BIOPSY      negative       SOCIAL HISTORY:  Social History     Socioeconomic History    Marital status:    Tobacco Use    Smoking status:  Former     Types: Pipe    Smokeless tobacco: Never   Substance and Sexual Activity    Alcohol use: Yes     Alcohol/week: 9.0 standard drinks of alcohol     Types: 6 Cans of beer, 3 Shots of liquor per week    Drug use: No    Sexual activity: Yes     Partners: Female     Birth control/protection: Partner-Vasectomy     Social Drivers of Health     Financial Resource Strain: Low Risk  (5/22/2024)    Overall Financial Resource Strain (CARDIA)     Difficulty of Paying Living Expenses: Not hard at all   Food Insecurity: No Food Insecurity (5/22/2024)    Hunger Vital Sign     Worried About Running Out of Food in the Last Year: Never true     Ran Out of Food in the Last Year: Never true   Transportation Needs: No Transportation Needs (10/9/2019)    PRAPARE - Transportation     Lack of Transportation (Medical): No     Lack of Transportation (Non-Medical): No   Physical Activity: Sufficiently Active (5/22/2024)    Exercise Vital Sign     Days of Exercise per Week: 6 days     Minutes of Exercise per Session: 70 min   Stress: No Stress Concern Present (5/22/2024)    [employer] - Occupational Stress Questionnaire     Feeling of Stress : Not at all   Housing Stability: Unknown (5/22/2024)    Housing Stability Vital Sign     Unable to Pay for Housing in the Last Year: No       FAMILY HISTORY:  Family History   Problem Relation Name Age of Onset    Dementia Mother      Dementia Father Mason Sr     Alcohol abuse Father Mason Sr     Diabetes Sister Sweetie     Hypertension Sister Sweetie     Diabetes Brother Consul     No Known Problems Son      Dementia Sister      Dementia Sister      No Known Problems Sister      Diabetes Brother Camillus     Diabetes Brother Arben     No Known Problems Brother      No Known Problems Maternal Aunt      No Known Problems Maternal Uncle      No Known Problems Paternal Aunt      No Known Problems Paternal Uncle      No Known Problems Maternal Grandmother      No Known  "Problems Maternal Grandfather      No Known Problems Paternal Grandmother      No Known Problems Paternal Grandfather      Heart disease Brother Anders     Blindness Neg Hx      Glaucoma Neg Hx      Retinal detachment Neg Hx      Macular degeneration Neg Hx      Amblyopia Neg Hx      Cancer Neg Hx      Cataracts Neg Hx      Strabismus Neg Hx      Stroke Neg Hx      Thyroid disease Neg Hx         ALLERGIES AND MEDICATIONS: updated and reviewed.  Review of patient's allergies indicates:  No Known Allergies  Current Outpatient Medications   Medication Sig Dispense Refill    amLODIPine (NORVASC) 10 MG tablet TAKE 1 TABLET DAILY 90 tablet 0    cholecalciferol, vitamin D3, (VITAMIN D3) 50 mcg (2,000 unit) Cap capsule Take by mouth once daily.      diclofenac sodium (VOLTAREN) 1 % Gel Apply 2 g topically 4 (four) times daily. 100 g 0    lisinopriL-hydrochlorothiazide (PRINZIDE,ZESTORETIC) 20-12.5 mg per tablet TAKE 1 TABLET DAILY 90 tablet 0    TRULICITY 4.5 mg/0.5 mL pen injector Inject 4.5 mg into the skin every 7 days. 12 pen 3     Current Facility-Administered Medications   Medication Dose Route Frequency Provider Last Rate Last Admin    gentamicin injection 80 mg  80 mg Intramuscular 1 time in Clinic/HOD Mayur Mckeon MD ROS  Review of Systems   Cardiovascular:  Negative for chest pain, palpitations and leg swelling.   Musculoskeletal:  Positive for arthralgias.   Neurological:  Negative for tingling and numbness.         OBJECTIVE     Physical Exam  Vitals:    10/02/24 0854   BP: 104/80   Pulse: 82   Temp: 98.4 °F (36.9 °C)    Body mass index is 25.78 kg/m².  Weight: 79.2 kg (174 lb 9.7 oz)   Height: 5' 9" (175.3 cm)     Physical Exam  Vitals and nursing note reviewed.   Constitutional:       General: He is not in acute distress.     Appearance: Normal appearance.   HENT:      Head: Normocephalic and atraumatic.      Right Ear: Tympanic membrane normal.      Left Ear: Tympanic membrane normal.      " Nose: Nose normal.      Mouth/Throat:      Mouth: Mucous membranes are moist.      Pharynx: No posterior oropharyngeal erythema.   Eyes:      Conjunctiva/sclera: Conjunctivae normal.      Pupils: Pupils are equal, round, and reactive to light.   Cardiovascular:      Rate and Rhythm: Normal rate and regular rhythm.      Heart sounds: Normal heart sounds. No murmur heard.  Pulmonary:      Effort: Pulmonary effort is normal. No respiratory distress.      Breath sounds: Normal breath sounds.   Abdominal:      General: Bowel sounds are normal.      Palpations: Abdomen is soft.      Tenderness: There is no abdominal tenderness.   Musculoskeletal:      Cervical back: Normal range of motion.      Right knee: Crepitus present. Normal range of motion. No tenderness.      Left knee: No crepitus.      Right lower leg: No edema.      Left lower leg: No edema.   Skin:     General: Skin is warm and dry.      Findings: No lesion.   Neurological:      Mental Status: He is alert and oriented to person, place, and time.   Psychiatric:         Mood and Affect: Mood normal.         Behavior: Behavior normal.           Health Maintenance         Date Due Completion Date    Foot Exam Never done ---    HIV Screening Never done ---    Low Dose Statin Never done ---    Shingles Vaccine (1 of 2) Never done ---    RSV Vaccine (Age 60+ and Pregnant patients) (1 - Risk 60-74 years 1-dose series) Never done ---    PROSTATE-SPECIFIC ANTIGEN 01/11/2024 1/11/2023    COVID-19 Vaccine (5 - 2024-25 season) 09/01/2024 11/22/2022    Eye Exam 09/07/2024 9/7/2023    Override on 9/7/2023: Done    Override on 9/7/2023: Done    Diabetes Urine Screening 12/18/2024 12/18/2023    Lipid Panel 12/18/2024 12/18/2023    Hemoglobin A1c 02/13/2025 8/13/2024    TETANUS VACCINE 01/17/2027 1/17/2017 (ClinicallyNA)    Override on 1/17/2017: Not Clinically Appropriate    Colorectal Cancer Screening 03/18/2029 3/18/2022              ASSESSMENT     64 y.o. male with     1.  Annual physical exam    2. Primary hypertension    3. Hypertriglyceridemia    4. Subclinical hyperthyroidism    5. Type 2 diabetes mellitus with hyperglycemia, without long-term current use of insulin    6. Needs flu shot    7. Benign prostatic hyperplasia without lower urinary tract symptoms    8. Elevated PSA    9. Primary osteoarthritis of right knee    10. Hypovitaminosis D    11. Meningioma        PLAN:     1. Annual physical exam  Counseled patient on importance of health prevention screening, immunizations, and overall wellness.    Check labs.   Flu shot today.   Colonoscopy due 2029.  - influenza (Flulaval, Fluzone, Fluarix) 45 mcg/0.5 mL IM vaccine (> or = 6 mo) 0.5 mL  - CBC W/ AUTO DIFFERENTIAL; Future    2. Primary hypertension  BP well controlled today.   Low sodium diet.   Daily exercise as tolerated.   Lisinopril-hctz 20-12.5 mg po daily and amlodipine 10 mg po daily.     3. Hypertriglyceridemia  - LIPID PANEL; Future    4. Subclinical hyperthyroidism  Monitor TSH.     5. Type 2 diabetes mellitus with hyperglycemia, without long-term current use of insulin  Continue Trulicity 4.5 mg.     6. Needs flu shot  - influenza (Flulaval, Fluzone, Fluarix) 45 mcg/0.5 mL IM vaccine (> or = 6 mo) 0.5 mL    7. Benign prostatic hyperplasia without lower urinary tract symptoms   PSA, Screening; Future  - Urinalysis; Future  - CULTURE, URINE; Future    8. Elevated PSA  Check PSA.   Follow up with Urology.   - PSA, Screening; Future    9. Primary osteoarthritis of right knee  Continue topical Voltaren PRN.   - X-ray Knee Ortho Right; Future    10. Hypovitaminosis D  Check Vitamin D.     11. Meningioma  Stable. Consider repeat imaging.         RTC in 1 year.        Yani Juarez, PRINCE, APRN, FNP-C  Family Medicine  [facility] [place]  10/02/2024 9:00 AM        Follow up in about 1 year (around 10/2/2025) for Larry Marquis, PRINCE, APRN, FNP-C.

## 2024-10-03 ENCOUNTER — LAB VISIT (OUTPATIENT)
Dept: LAB | Facility: HOSPITAL | Age: 64
End: 2024-10-03
Attending: NURSE PRACTITIONER
Payer: COMMERCIAL

## 2024-10-03 DIAGNOSIS — R97.20 ELEVATED PSA: ICD-10-CM

## 2024-10-03 DIAGNOSIS — Z00.00 ANNUAL PHYSICAL EXAM: ICD-10-CM

## 2024-10-03 DIAGNOSIS — N40.0 BENIGN PROSTATIC HYPERPLASIA WITHOUT LOWER URINARY TRACT SYMPTOMS: ICD-10-CM

## 2024-10-03 DIAGNOSIS — E78.1 HYPERTRIGLYCERIDEMIA: ICD-10-CM

## 2024-10-03 LAB
BASOPHILS # BLD AUTO: 0.04 K/UL (ref 0–0.2)
BASOPHILS NFR BLD: 0.6 % (ref 0–1.9)
BILIRUB UR QL STRIP: NEGATIVE
CHOLEST SERPL-MCNC: 210 MG/DL (ref 120–199)
CHOLEST/HDLC SERPL: 4.3 {RATIO} (ref 2–5)
CLARITY UR: CLEAR
COLOR UR: YELLOW
COMPLEXED PSA SERPL-MCNC: 4.1 NG/ML (ref 0–4)
DIFFERENTIAL METHOD BLD: NORMAL
EOSINOPHIL # BLD AUTO: 0 K/UL (ref 0–0.5)
EOSINOPHIL NFR BLD: 0.4 % (ref 0–8)
ERYTHROCYTE [DISTWIDTH] IN BLOOD BY AUTOMATED COUNT: 14.2 % (ref 11.5–14.5)
GLUCOSE UR QL STRIP: NEGATIVE
HCT VFR BLD AUTO: 45.7 % (ref 40–54)
HDLC SERPL-MCNC: 49 MG/DL (ref 40–75)
HDLC SERPL: 23.3 % (ref 20–50)
HGB BLD-MCNC: 16 G/DL (ref 14–18)
HGB UR QL STRIP: NEGATIVE
IMM GRANULOCYTES # BLD AUTO: 0.02 K/UL (ref 0–0.04)
IMM GRANULOCYTES NFR BLD AUTO: 0.3 % (ref 0–0.5)
KETONES UR QL STRIP: NEGATIVE
LDLC SERPL CALC-MCNC: 144.4 MG/DL (ref 63–159)
LEUKOCYTE ESTERASE UR QL STRIP: NEGATIVE
LYMPHOCYTES # BLD AUTO: 1.6 K/UL (ref 1–4.8)
LYMPHOCYTES NFR BLD: 24.1 % (ref 18–48)
MCH RBC QN AUTO: 28.6 PG (ref 27–31)
MCHC RBC AUTO-ENTMCNC: 35 G/DL (ref 32–36)
MCV RBC AUTO: 82 FL (ref 82–98)
MONOCYTES # BLD AUTO: 0.7 K/UL (ref 0.3–1)
MONOCYTES NFR BLD: 10.2 % (ref 4–15)
NEUTROPHILS # BLD AUTO: 4.4 K/UL (ref 1.8–7.7)
NEUTROPHILS NFR BLD: 64.4 % (ref 38–73)
NITRITE UR QL STRIP: NEGATIVE
NONHDLC SERPL-MCNC: 161 MG/DL
NRBC BLD-RTO: 0 /100 WBC
PH UR STRIP: 6 [PH] (ref 5–8)
PLATELET # BLD AUTO: 261 K/UL (ref 150–450)
PMV BLD AUTO: 10.2 FL (ref 9.2–12.9)
PROT UR QL STRIP: ABNORMAL
RBC # BLD AUTO: 5.59 M/UL (ref 4.6–6.2)
SP GR UR STRIP: 1.03 (ref 1–1.03)
TRIGL SERPL-MCNC: 83 MG/DL (ref 30–150)
URN SPEC COLLECT METH UR: ABNORMAL
UROBILINOGEN UR STRIP-ACNC: NEGATIVE EU/DL
WBC # BLD AUTO: 6.77 K/UL (ref 3.9–12.7)

## 2024-10-03 PROCEDURE — 85025 COMPLETE CBC W/AUTO DIFF WBC: CPT | Performed by: NURSE PRACTITIONER

## 2024-10-03 PROCEDURE — 81003 URINALYSIS AUTO W/O SCOPE: CPT | Performed by: NURSE PRACTITIONER

## 2024-10-03 PROCEDURE — 80061 LIPID PANEL: CPT | Performed by: NURSE PRACTITIONER

## 2024-10-03 PROCEDURE — 36415 COLL VENOUS BLD VENIPUNCTURE: CPT | Mod: PO | Performed by: NURSE PRACTITIONER

## 2024-10-03 PROCEDURE — 84153 ASSAY OF PSA TOTAL: CPT | Performed by: NURSE PRACTITIONER

## 2024-10-21 ENCOUNTER — OFFICE VISIT (OUTPATIENT)
Dept: UROLOGY | Facility: CLINIC | Age: 64
End: 2024-10-21
Payer: COMMERCIAL

## 2024-10-21 VITALS — BODY MASS INDEX: 25.64 KG/M2 | WEIGHT: 173.63 LBS

## 2024-10-21 DIAGNOSIS — N40.0 BPH WITHOUT OBSTRUCTION/LOWER URINARY TRACT SYMPTOMS: ICD-10-CM

## 2024-10-21 DIAGNOSIS — R35.0 URINARY FREQUENCY: ICD-10-CM

## 2024-10-21 DIAGNOSIS — R97.20 ELEVATED PSA: Primary | ICD-10-CM

## 2024-10-21 PROCEDURE — 4010F ACE/ARB THERAPY RXD/TAKEN: CPT | Mod: CPTII,S$GLB,, | Performed by: UROLOGY

## 2024-10-21 PROCEDURE — 3072F LOW RISK FOR RETINOPATHY: CPT | Mod: CPTII,S$GLB,, | Performed by: UROLOGY

## 2024-10-21 PROCEDURE — 3008F BODY MASS INDEX DOCD: CPT | Mod: CPTII,S$GLB,, | Performed by: UROLOGY

## 2024-10-21 PROCEDURE — 99999 PR PBB SHADOW E&M-EST. PATIENT-LVL III: CPT | Mod: PBBFAC,,, | Performed by: UROLOGY

## 2024-10-21 PROCEDURE — 1159F MED LIST DOCD IN RCRD: CPT | Mod: CPTII,S$GLB,, | Performed by: UROLOGY

## 2024-10-21 PROCEDURE — 3044F HG A1C LEVEL LT 7.0%: CPT | Mod: CPTII,S$GLB,, | Performed by: UROLOGY

## 2024-10-21 PROCEDURE — 1160F RVW MEDS BY RX/DR IN RCRD: CPT | Mod: CPTII,S$GLB,, | Performed by: UROLOGY

## 2024-10-21 PROCEDURE — 99213 OFFICE O/P EST LOW 20 MIN: CPT | Mod: S$GLB,,, | Performed by: UROLOGY

## 2024-10-21 NOTE — PROGRESS NOTES
Subjective:       Patient ID: Blas Alcocer is a 64 y.o. male The patient's last visit with me was on 4/26/2023.       Chief Complaint:   Chief Complaint   Patient presents with    Annual Exam         Elevated PSA    He underwent a prostate needle biopsy on 2/15/2022.  His biopsy was indicated due to: Elevated PSA.  Afterwards he experienced: Gross Hematuria and Blood in stool.  These symptoms have resolved.  His PSA prior to biopsy was 4.9.  His prostate size was 85 grams.  The ultrasound did not show a median lobe.  He currently does not have erectile dysfunction.  His pathology showed: benign disease.    9/8/2022  He is back with a new PSA.    3/8/2023  He is back with a new PSA.    4/26/2023  He is back with a MRI.    10/21/2024           ACTIVE MEDICAL ISSUES:  Patient Active Problem List   Diagnosis    Hypertriglyceridemia    Prediabetes    Hypovitaminosis D    Hypertension    Sixth nerve palsy of right eye    Brain mass    Wears glasses    Esotropia    Heel pain, bilateral    Abnormal thyroid blood test    Class 1 obesity due to excess calories with serious comorbidity and body mass index (BMI) of 30.0 to 30.9 in adult    Subclinical hyperthyroidism    Type 2 diabetes mellitus with hyperglycemia, without long-term current use of insulin       ALLERGIES AND MEDICATIONS: updated and reviewed.  Review of patient's allergies indicates:  No Known Allergies  Current Outpatient Medications   Medication Sig    amLODIPine (NORVASC) 10 MG tablet TAKE 1 TABLET DAILY    cholecalciferol, vitamin D3, (VITAMIN D3) 50 mcg (2,000 unit) Cap capsule Take by mouth once daily.    diclofenac sodium (VOLTAREN) 1 % Gel Apply 2 g topically 4 (four) times daily.    lisinopriL-hydrochlorothiazide (PRINZIDE,ZESTORETIC) 20-12.5 mg per tablet TAKE 1 TABLET DAILY    TRULICITY 4.5 mg/0.5 mL pen injector Inject 4.5 mg into the skin every 7 days.     Current Facility-Administered Medications   Medication    gentamicin injection 80 mg        Review of Systems   Constitutional:  Negative for activity change, fatigue, fever and unexpected weight change.   HENT:  Negative for congestion.    Eyes:  Negative for redness.   Respiratory:  Negative for chest tightness and shortness of breath.    Cardiovascular:  Negative for chest pain and leg swelling.   Gastrointestinal:  Negative for abdominal pain, constipation, diarrhea, nausea and vomiting.   Genitourinary:  Negative for dysuria, flank pain, frequency, hematuria, penile pain, penile swelling, scrotal swelling, testicular pain and urgency.   Musculoskeletal:  Negative for arthralgias and back pain.   Neurological:  Negative for dizziness and light-headedness.   Psychiatric/Behavioral:  Negative for behavioral problems and confusion. The patient is not nervous/anxious.    All other systems reviewed and are negative.      Objective:      Vitals:    10/21/24 1626   Weight: 78.8 kg (173 lb 9.8 oz)           Physical Exam  Vitals and nursing note reviewed.   Constitutional:       Appearance: He is well-developed.   HENT:      Head: Normocephalic.   Eyes:      Conjunctiva/sclera: Conjunctivae normal.   Neck:      Thyroid: No thyromegaly.      Trachea: No tracheal deviation.   Cardiovascular:      Rate and Rhythm: Normal rate.      Heart sounds: Normal heart sounds.   Pulmonary:      Effort: Pulmonary effort is normal. No respiratory distress.      Breath sounds: Normal breath sounds. No wheezing.   Abdominal:      General: Bowel sounds are normal.      Palpations: Abdomen is soft.      Tenderness: There is no abdominal tenderness. There is no rebound.      Hernia: No hernia is present.   Musculoskeletal:         General: No tenderness. Normal range of motion.      Cervical back: Normal range of motion and neck supple.   Lymphadenopathy:      Cervical: No cervical adenopathy.   Skin:     General: Skin is warm and dry.      Findings: No erythema or rash.   Neurological:      Mental Status: He is alert and  oriented to person, place, and time.   Psychiatric:         Behavior: Behavior normal.         Thought Content: Thought content normal.         Judgment: Judgment normal.         Urine dipstick shows negative for all components.  Micro exam: negative for WBC's or RBC's.     Component Ref Range & Units 2 wk ago 2 yr ago 5 yr ago 7 yr ago 11 yr ago 13 yr ago 14 yr ago   PSA, Screen 0.00 - 4.00 ng/mL 4.1 High  4.9 High  CM 3.2 CM 2.3 CM 1.93 R, CM 1.3 R, CM 3.2 R, CM   Comment: The testing method is a chemiluminescent microparticle immunoassay  manufactured by [employer] and performed on the Trust[employer] Insight  or  Recommind system. Values obtained with different assay manufacturers  for  methods may be different and cannot be used interchangeably.  PSA Expected levels:  Hormonal Therapy: <0.05 ng/ml  Prostatectomy: <0.01 ng/ml  Radiation Therapy: <1.00 ng/ml   Resulting Agency  [facility]              Narrative  Performed by: [facility]  Send normal result to authorizing provider's In Basket if  patient is active on MyChart:->Yes   Specimen Collected: 10/03/24 08:41 CDT       MRI Prostate W W/O Contrast  Order: [id number]  Status: Final result     Visible to patient: Yes (seen)     Next appt: 08/01/2023 at 08:00 AM in Lab (LAB, [facility])     Dx: Elevated PSA     1 Result Note    1 Patient Communication  Details    Reading Physician Reading Date Result Priority   Arthur Fontanez MD  [phone] 4/2/2023 Routine     Narrative & Impression  EXAMINATION:  MRI PROSTATE W W/O CONTRAST     CLINICAL HISTORY:  Prostate cancer suspected;  Elevated prostate specific antigen (PSA)     Additional history: None provided.     TECHNIQUE:  Multiparametric MRI of the prostate/pelvis performed on a 3T scanner with phase pelvic coil. Multiplanar, multisequence images including high resolution, small field-of-view T2-WI; axial diffusion weighted images with multiple B-values and creation of ADC-maps; and  dynamic contrast enhanced T1-weighted images through the prostate were obtained before, during, and after the administration of 10 cc intravenous gadolinium.     COMPARISON:  None.     FINDINGS:  Previous biopsy: Negative biopsy 02/15/2022     PSA: 5.5 ng/mL 01/11/2023     Prior therapy: None     Prostate: 6.1 x 4.7 x 5.8 cm corresponding to a computed volume of 87 cc.     Peripheral zone: Several linear regions of T2 signal hypointensity; score 2.     Transitional zone: Benign prostatic hyperplasia without focal suspicious abnormality, score 2.     Neurovascular bundle: Normal appearance.     Seminal vesicles: Normal appearance.     Adjacent Organ Involvement: No evidence for urinary bladder or rectal invasion.     Lymphadenopathy: None.     Other Findings: None.     Impression:     1. No suspicious focal prostate lesion.  2. BPH.  Overall Assessment: PI-RADS 2 - Low (clinically significant cancer is unlikely to be present)     Number of targets created for potential MR/US fusion biopsy     Peripheral zone: 0     Transition zone: 0        Electronically signed by: Arthur Fontanez  Date:                                            04/02/2023  Time:                                           07:40       Assessment:       1. Elevated PSA    2. BPH without obstruction/lower urinary tract symptoms    3. Urinary frequency                  Plan:       1. Elevated PSA (Primary)  Improved  monitor  - Prostate Specific Antigen, Diagnostic; Future    2. BPH without obstruction/lower urinary tract symptoms  Doing well    3. Urinary frequency  stable             Follow up in about 6 months (around 4/21/2025) for Follow up Established, Review PSA.

## 2024-11-15 DIAGNOSIS — I10 PRIMARY HYPERTENSION: ICD-10-CM

## 2024-11-15 RX ORDER — AMLODIPINE BESYLATE 10 MG/1
10 TABLET ORAL
Qty: 90 TABLET | Refills: 3 | Status: SHIPPED | OUTPATIENT
Start: 2024-11-15

## 2024-11-15 NOTE — TELEPHONE ENCOUNTER
Care Due:                  Date            Visit Type   Department     Provider  --------------------------------------------------------------------------------                                [facility] FAM[facility]                              PRIMARY      MEDICINE/INTERN  Last Visit: 08-      CARE ([facility])   [facility]         Reddy Kong  Next Visit: None Scheduled  None         None Found                                                            Last  Test          Frequency    Reason                     Performed    Due Date  --------------------------------------------------------------------------------    Office Visit  12 months..  amLODIPine, diclofenac,    08-   08-                             lisinopriL-hydrochlorothi                             azide....................    [employer] Embedded Care Due Messages. Reference number: [id number].   11/15/2024 12:31:16 AM CST

## 2024-12-09 DIAGNOSIS — I10 ESSENTIAL HYPERTENSION: ICD-10-CM

## 2024-12-09 RX ORDER — LISINOPRIL AND HYDROCHLOROTHIAZIDE 12.5; 2 MG/1; MG/1
1 TABLET ORAL
Qty: 90 TABLET | Refills: 3 | Status: SHIPPED | OUTPATIENT
Start: 2024-12-09

## 2024-12-09 NOTE — TELEPHONE ENCOUNTER
No care due was identified.  [facility] Embedded Care Due Messages. Reference number: [id number].   12/09/2024 1:04:26 AM CST

## 2025-01-14 ENCOUNTER — PATIENT OUTREACH (OUTPATIENT)
Dept: ADMINISTRATIVE | Facility: HOSPITAL | Age: 65
End: 2025-01-14
Payer: COMMERCIAL

## 2025-01-14 DIAGNOSIS — E11.9 DIABETES MELLITUS WITHOUT COMPLICATION: Primary | ICD-10-CM

## 2025-01-14 NOTE — PROGRESS NOTES
VBHM Score: 1     Foot Exam    Pneumonia Vaccine  Shingles/Zoster Vaccine  RSV Vaccine     Pt called back reviewed SDOH to see if there were any needs or concerns none at this time  Labs scheduled   Reminded vaccines available at pharmacies [facility]

## 2025-01-22 ENCOUNTER — TELEPHONE (OUTPATIENT)
Dept: OPHTHALMOLOGY | Facility: CLINIC | Age: 65
End: 2025-01-22
Payer: COMMERCIAL

## 2025-01-22 NOTE — TELEPHONE ENCOUNTER
Left message stating clinic closed 1/23 due to weather. Advised to call back or reschedule via [employer].

## 2025-01-24 ENCOUNTER — TELEPHONE (OUTPATIENT)
Dept: OPTOMETRY | Facility: CLINIC | Age: 65
End: 2025-01-24
Payer: COMMERCIAL

## 2025-01-24 NOTE — TELEPHONE ENCOUNTER
LVM to r/s appt       ----- Message from Yany sent at 1/23/2025 10:44 AM CST -----  Contact: pt @ [phone]  GERVY PAPION calling regarding Appointment Access  (message) for #pt is calling to reschedule appt 1/23 due to weather, asking for call back

## 2025-01-29 ENCOUNTER — TELEPHONE (OUTPATIENT)
Dept: OPTOMETRY | Facility: CLINIC | Age: 65
End: 2025-01-29
Payer: COMMERCIAL

## 2025-02-05 DIAGNOSIS — M25.569 KNEE PAIN, UNSPECIFIED CHRONICITY, UNSPECIFIED LATERALITY: ICD-10-CM

## 2025-02-05 NOTE — TELEPHONE ENCOUNTER
Care Due:                  Date            Visit Type   Department     Provider  --------------------------------------------------------------------------------                                [facility] FAMILY                              PRIMARY      MEDICINE/INTERN  Last Visit: 08-      CARE ([facility])   LORENA Kong                              [facility]                              PRIMARY      MEDICINE/INTERN  Next Visit: 06-      CARE ([facility])   AL ULICES Knog                                                            Last  Test          Frequency    Reason                     Performed    Due Date  --------------------------------------------------------------------------------    Office Visit  12 months..  amLODIPine, diclofenac,    08-   08-                             lisinopriL-hydrochlorothi                             azide....................    [employer] Embedded Care Due Messages. Reference number: [id number].   2/05/2025 7:04:52 AM CST

## 2025-02-06 RX ORDER — DICLOFENAC SODIUM 10 MG/G
2 GEL TOPICAL 4 TIMES DAILY
Qty: 100 G | Refills: 0 | Status: SHIPPED | OUTPATIENT
Start: 2025-02-06

## 2025-02-13 ENCOUNTER — LAB VISIT (OUTPATIENT)
Dept: LAB | Facility: HOSPITAL | Age: 65
End: 2025-02-13
Attending: HOSPITALIST
Payer: COMMERCIAL

## 2025-02-13 DIAGNOSIS — E11.65 TYPE 2 DIABETES MELLITUS WITH HYPERGLYCEMIA, WITHOUT LONG-TERM CURRENT USE OF INSULIN: ICD-10-CM

## 2025-02-13 DIAGNOSIS — E05.90 SUBCLINICAL HYPERTHYROIDISM: ICD-10-CM

## 2025-02-13 LAB
ANION GAP SERPL CALC-SCNC: 8 MMOL/L (ref 8–16)
BUN SERPL-MCNC: 16 MG/DL (ref 8–23)
CALCIUM SERPL-MCNC: 9.1 MG/DL (ref 8.7–10.5)
CHLORIDE SERPL-SCNC: 107 MMOL/L (ref 95–110)
CO2 SERPL-SCNC: 23 MMOL/L (ref 23–29)
CREAT SERPL-MCNC: 1 MG/DL (ref 0.5–1.4)
EST. GFR  (NO RACE VARIABLE): >60 ML/MIN/1.73 M^2
ESTIMATED AVG GLUCOSE: 108 MG/DL (ref 68–131)
GLUCOSE SERPL-MCNC: 107 MG/DL (ref 70–110)
HBA1C MFR BLD: 5.4 % (ref 4–5.6)
POTASSIUM SERPL-SCNC: 4.1 MMOL/L (ref 3.5–5.1)
SODIUM SERPL-SCNC: 138 MMOL/L (ref 136–145)
T4 FREE SERPL-MCNC: 1.04 NG/DL (ref 0.71–1.51)
TSH SERPL DL<=0.005 MIU/L-ACNC: 0.25 UIU/ML (ref 0.4–4)

## 2025-02-13 PROCEDURE — 84443 ASSAY THYROID STIM HORMONE: CPT | Performed by: HOSPITALIST

## 2025-02-13 PROCEDURE — 84439 ASSAY OF FREE THYROXINE: CPT | Performed by: HOSPITALIST

## 2025-02-13 PROCEDURE — 83036 HEMOGLOBIN GLYCOSYLATED A1C: CPT | Performed by: HOSPITALIST

## 2025-02-13 PROCEDURE — 80048 BASIC METABOLIC PNL TOTAL CA: CPT | Performed by: HOSPITALIST

## 2025-02-20 ENCOUNTER — OFFICE VISIT (OUTPATIENT)
Dept: ENDOCRINOLOGY | Facility: CLINIC | Age: 65
End: 2025-02-20
Payer: COMMERCIAL

## 2025-02-20 VITALS
HEART RATE: 85 BPM | BODY MASS INDEX: 26.23 KG/M2 | DIASTOLIC BLOOD PRESSURE: 84 MMHG | WEIGHT: 177.63 LBS | SYSTOLIC BLOOD PRESSURE: 110 MMHG

## 2025-02-20 DIAGNOSIS — E05.90 SUBCLINICAL HYPERTHYROIDISM: ICD-10-CM

## 2025-02-20 DIAGNOSIS — E66.09 CLASS 1 OBESITY DUE TO EXCESS CALORIES WITH SERIOUS COMORBIDITY AND BODY MASS INDEX (BMI) OF 30.0 TO 30.9 IN ADULT: ICD-10-CM

## 2025-02-20 DIAGNOSIS — E66.811 CLASS 1 OBESITY DUE TO EXCESS CALORIES WITH SERIOUS COMORBIDITY AND BODY MASS INDEX (BMI) OF 30.0 TO 30.9 IN ADULT: ICD-10-CM

## 2025-02-20 DIAGNOSIS — E55.9 HYPOVITAMINOSIS D: ICD-10-CM

## 2025-02-20 DIAGNOSIS — E11.65 TYPE 2 DIABETES MELLITUS WITH HYPERGLYCEMIA, WITHOUT LONG-TERM CURRENT USE OF INSULIN: Primary | ICD-10-CM

## 2025-02-20 PROCEDURE — 99999 PR PBB SHADOW E&M-EST. PATIENT-LVL III: CPT | Mod: PBBFAC,,, | Performed by: HOSPITALIST

## 2025-02-20 RX ORDER — DULAGLUTIDE 4.5 MG/.5ML
4.5 INJECTION, SOLUTION SUBCUTANEOUS
Qty: 12 PEN | Refills: 3 | Status: SHIPPED | OUTPATIENT
Start: 2025-02-20 | End: 2026-02-20

## 2025-02-20 NOTE — ASSESSMENT & PLAN NOTE
- History of prediabetes for many years, now with worsening hyperglycemia weight gain in 2/2022  - Treat as DM given longstand hx of prediabetes, insulin resistant hyperglycemia  - Diabetes is AT goal, given the most recent A1C reviewed 2/20/2025  - Reviewed goals of therapy: to achieve the best control possible without hypoglycemia, with a continue target A1C <7%.  - A1C is improving on therapy  - CONTINUE: Trulicity 4.5 mg once a week injection >> sent to pharmacy  - Discussed with patient he is monitoring his diet  - Repeat lab work in 6 months for monitoring  - Follow up as scheduled with lab work prior. 6 months in clinic follow-up.

## 2025-02-20 NOTE — ASSESSMENT & PLAN NOTE
- Body mass index is 26.23 kg/m².  - dietary discussion as above  - continue to monitor weight, encourage patient to continue weight loss  - encourage continue exercise

## 2025-02-20 NOTE — PROGRESS NOTES
"Subjective:      Patient ID: Blas Alcocer is a 64 y.o. male presented to [facility] on 2/20/2025.  Chief Complaint:  Diabetes    History of Present Illness: Blas Alcocer is a 64 y.o. 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    Still with weight loss, Now back on exercise regimen.    Tolerating Trulicity 4.5 mg once a week, Getting it from mail-in pharmacy  Patient is changing his insurance once age 65 to Medicare  Weight today in clinic 177 , previous weight : 174< 176 <181, 182 lb, 188 lb, started Trulicity @ 200lb  Walking 4-5 miles a day (4x a week), golf 1x a week      1) Type 2 diabetes/prediabetes  - Diagnosed w/ DM:  prediabetes since 2019 with recent hyperglycemia on fasting blood work  - Patient is also interested in weight loss  - Does have have glucometer    - Current meds:  Trulicity 4.5 mg once a week  - Weight trend: decrease noted  - Diabetes Education: No  - Diabetes Related Hospitalization:  No  - Hx of pancreatitis, hx of thyroid cancer: No  - Family history of diabetes: Yes  - Occupation: working    Eye exam current (within one year): yes, DR: no  Reports cuts or ulcers on feet:   Denies  Statin: Not taking, ACE/ARB: Taking    Diabetes lab work  Lab Results   Component Value Date    HGBA1C 5.4 02/13/2025    HGBA1C 5.1 08/13/2024    HGBA1C 5.2 02/15/2024    HGBA1C 5.3 08/01/2023     No results found for: "CPEPTIDE", "GLUTAMICACID", "ISLETCELLANT"   No results found for: "FRUCTOSAMINE"  Lab Results   Component Value Date    MICALBCREAT 12.0 02/13/2025     No results found for: "GTFRLKTR73"    Diabetes Management Status: Reviewed this office visit  Screening or Prevention Patient's value Goal Complete/Controlled?   Lipid profile : " 10/03/2024 Annually No     Dilated retinal exam : 09/07/2023 Annually Yes     Foot exam   Most Recent Foot Exam Date: Not Found Annually No          2) Subclinical hyperthyroidism  - Abnormal thyroid lab: Low TSH, normal free T4, subclinical hyperthyroidism?>> longstanding history prior to gamma knife  - First diagnosed: 2015?, results shows decrease in TSH from 8378-2256.  Possibly worsen  post Gamma Knife treatment 2018  - Hormonal workup done showed normal antibodies:  Negative TSI, TPO  - No other pituitary issues:  Recent blood work low suspicion for hypopituitarism  - No family hx   - We have been monitoring lab work.  Given negative antibody.  Normal TSH now 2024 normal T4  - Patient denies any symptoms of hyperthyroidism including palpitation, tremors, weight loss    - Patient reports fatigue off and on otherwise does have issue with some weight gain. Patient denies headache, blurred vision  - No family history of thyroid disorder that he can recall  - Besides from Gamma Knife no other radiation exposure  - In the past had testosterone evaluated by PCP:  Normal, of note prior to gamma knife  - Did have 2 COVID infection asymptomatic per patient last 12/21  - Denies any use of:  Amiodarone/lithium  - No new medication.  Compliant with blood pressure medication amlodipine, lisinopril  - Herbal/Vitamin/Supplement:  Denies take Kelp, Iodine, Biotin, Selenium, Sea sarabia, Bladder wrack    Thyroid lab work  Lab Results   Component Value Date    TSH 0.249 (L) 02/13/2025    TSH 0.417 08/13/2024    TSH 0.515 08/01/2023    FREET4 1.04 02/13/2025    FREET4 0.90 08/13/2024    FREET4 1.00 08/01/2023    E0DMUQM 106 04/12/2022    A4SGHKY 114 07/07/2015      Antibodies  Lab Results   Component Value Date    THYROPEROXID <6.0 04/12/2022    THYROIDSTIMI <0.10 04/12/2022            Reviewed past surgical, medical, family, social history and updated as appropriate.  Review of Systems: see HPI above    Objective:   /84    Pulse 85   Wt 80.6 kg (177 lb 9.6 oz)   BMI 26.23 kg/m²     Body mass index is 26.23 kg/m².  Vital signs reviewed    Physical Exam  Vitals and nursing note reviewed.   Constitutional:       Appearance: Normal appearance. He is well-developed. He is obese. He is not ill-appearing.   Neck:      Thyroid: No thyromegaly.   Pulmonary:      Effort: Pulmonary effort is normal. No respiratory distress.   Musculoskeletal:         General: Normal range of motion.      Cervical back: Normal range of motion.   Neurological:      General: No focal deficit present.      Mental Status: He is alert. Mental status is at baseline.   Psychiatric:         Mood and Affect: Mood normal.         Behavior: Behavior normal.       Lab Reviewed:  See results in subjective  Lab Results   Component Value Date    HGBA1C 5.4 02/13/2025     Lab Results   Component Value Date    CHOL 210 (H) 10/03/2024    HDL 49 10/03/2024    LDLCALC 144.4 10/03/2024    TRIG 83 10/03/2024    CHOLHDL 23.3 10/03/2024     Lab Results   Component Value Date     02/13/2025    K 4.1 02/13/2025     02/13/2025    CO2 23 02/13/2025     02/13/2025    BUN 16 02/13/2025    CREATININE 1.0 02/13/2025    CALCIUM 9.1 02/13/2025    PROT 6.8 04/12/2022    ALBUMIN 4.0 04/12/2022    ALBUMIN 4.3 04/12/2022    BILITOT 1.0 04/12/2022    ALKPHOS 47 (L) 04/12/2022    AST 24 04/12/2022    ALT 29 04/12/2022    ANIONGAP 8 02/13/2025    ESTGFRAFRICA >60 07/11/2022    EGFRNONAA >60 07/11/2022    TSH 0.249 (L) 02/13/2025    ZHUERBAD23BK 40 08/13/2024     Assessment     1. Type 2 diabetes mellitus with hyperglycemia, without long-term current use of insulin  TRULICITY 4.5 mg/0.5 mL pen injector    Hemoglobin A1C    TSH    T4, Free      2. Class 1 obesity due to excess calories with serious comorbidity and body mass index (BMI) of 30.0 to 30.9 in adult        3. Hypovitaminosis D  Vitamin D      4. Subclinical hyperthyroidism          Plan     Type 2 diabetes mellitus with  hyperglycemia, without long-term current use of insulin  - History of prediabetes for many years, now with worsening hyperglycemia weight gain in 2/2022  - Treat as DM given longstand hx of prediabetes, insulin resistant hyperglycemia  - Diabetes is AT goal, given the most recent A1C reviewed 2/20/2025  - Reviewed goals of therapy: to achieve the best control possible without hypoglycemia, with a continue target A1C <7%.  - A1C is improving on therapy  - CONTINUE: Trulicity 4.5 mg once a week injection >> sent to pharmacy  - Discussed with patient he is monitoring his diet  - Repeat lab work in 6 months for monitoring  - Follow up as scheduled with lab work prior. 6 months in clinic follow-up.    Class 1 obesity due to excess calories with serious comorbidity and body mass index (BMI) of 30.0 to 30.9 in adult  - Body mass index is 26.23 kg/m².  - dietary discussion as above  - continue to monitor weight, encourage patient to continue weight loss  - encourage continue exercise    Hypovitaminosis D  - lab work reviewed, low   - advised patient to start  vitamin D3 2000 IU daily   - yearly monitor    Subclinical hyperthyroidism  - patient with longstanding history of low TSH with normal free T4,  since 2015  - idiopathic given negative TPO/TSI antibodies  - STABLE  - Stable thyroid function repeat lab work every 6 months    Advised patient to follow up with PCP for routine health maintenance care.   RTC in 6 months    Mckinley Romero M.D.  Endocrinology  [facility]  2/20/2025      Disclaimer: This note has been generated in part with the use of voice-recognition software. There may be typographical errors that have been missed during proof-reading.

## 2025-02-21 ENCOUNTER — OFFICE VISIT (OUTPATIENT)
Dept: OPTOMETRY | Facility: CLINIC | Age: 65
End: 2025-02-21
Payer: COMMERCIAL

## 2025-02-21 DIAGNOSIS — E11.9 TYPE 2 DIABETES MELLITUS WITHOUT OPHTHALMIC MANIFESTATIONS: ICD-10-CM

## 2025-02-21 DIAGNOSIS — E11.65 TYPE 2 DIABETES MELLITUS WITH HYPERGLYCEMIA, WITHOUT LONG-TERM CURRENT USE OF INSULIN: Primary | ICD-10-CM

## 2025-02-21 DIAGNOSIS — G93.89 BRAIN MASS: ICD-10-CM

## 2025-02-21 DIAGNOSIS — H10.13 CONJUNCTIVITIS, ALLERGIC, BILATERAL: ICD-10-CM

## 2025-02-21 DIAGNOSIS — H04.123 DRY EYE SYNDROME, BILATERAL: ICD-10-CM

## 2025-02-21 DIAGNOSIS — H52.4 PRESBYOPIA: ICD-10-CM

## 2025-02-21 DIAGNOSIS — H50.00 ESOTROPIA: ICD-10-CM

## 2025-02-21 DIAGNOSIS — H49.21 SIXTH NERVE PALSY OF RIGHT EYE: ICD-10-CM

## 2025-02-21 DIAGNOSIS — H53.40 VISUAL FIELD DEFECT OF RIGHT EYE: ICD-10-CM

## 2025-02-21 PROCEDURE — 99999 PR PBB SHADOW E&M-EST. PATIENT-LVL II: CPT | Mod: PBBFAC,,, | Performed by: OPTOMETRIST

## 2025-04-14 ENCOUNTER — PATIENT OUTREACH (OUTPATIENT)
Dept: ADMINISTRATIVE | Facility: HOSPITAL | Age: 65
End: 2025-04-14
Payer: COMMERCIAL

## 2025-04-14 NOTE — PROGRESS NOTES
Care Coordination Encounter Details:       MyChart Portal Status:         [x]  Reviewed MyChart Portal Status offered / enrolled if applicable        Additional Notes:     MyChart Outcomes: Pt is enrolled & active          Updates Requested / Reviewed:        Updated Care Coordination Note, Care Everywhere, , Care Team Updated, Removed  or Duplicate Orders, and Immunizations Reconciliation Completed or Queried: [place]         Health Maintenance Screening(s) Due:      Health Maintenance Topics Overdue:      VBHM Score: 1     Foot Exam    Pneumonia Vaccine  Shingles/Zoster Vaccine  RSV Vaccine                  Health Maintenance Topic(s) Outreach Outcomes & Actions Taken:    Diabetic Foot Exam - Outreach Outcomes & Actions Taken  : ADDED TO THE APPONTMENT NOTE    Medication Adherence / Statins - Outreach Outcomes & Actions Taken  : Primary Care Appt Scheduled and MESSAGE SENT TO THE PCP         Additional Notes:  THE PATIENT IS SCHEDULED 25. A MESSAGE WAS SENT TO THE PCP ABOUT STATIN, FOOT CARE ADDED TO APPOINTMENT NOTE.           Chronic Disease Management:     Diabetes Measures        Lab Results   Component Value Date    HGBA1C 5.4 2025           [x]  Reviewed chart for active Diabetes diagnosis     []  Scheduled necessary follow up appointments if needed         Additional Notes:  PATIENT IS SCHEDULED 25 FOR HIS 6 MONTH LAB           Hypertension Measures        BP Readings from Last 1 Encounters:   25 110/84           [x]  Reviewed chart for active Hypertension diagnosis     []  Reviewed & documented Home BP Cuff     []  Documented a Remote BP if needed & applicable     []  Scheduled necessary follow up appointments with Primary Care if needed         Additional Notes:  BLOOD PRESSURE IS WNL           Provider Team Continuity:     Last PCP Visit Date: 8/15/2023          [x]  Reviewed Primary Care Provider Visits, Annual Wellness Visit, and Future           Appointments to ensure appointments have been scheduled and/or           completed        Additional Notes:  PATIENT IS SCHEDULED FOR HIS ANNUAL APPOINTMENT 6/13/25         Social Determinants of Health          [x]  Reviewed, completed, and/or updated the following sections:                  Food Insecurity, Transportation Needs, Financial Resource Strain,                 Tobacco Use        Additional Notes:  SDOH HAS BEEN REVIEWED AND WAS UPDATED ON 2/17/15 AND 2/20/25.           Care Management, Digital Medicine, and/or Education Referrals    OPCM Risk Score: 15.9             Additional Notes:  THE PATIENT IS ACTIVE IN DIGITAL MEDICINE

## 2025-04-21 ENCOUNTER — LAB VISIT (OUTPATIENT)
Dept: LAB | Facility: HOSPITAL | Age: 65
End: 2025-04-21
Attending: UROLOGY

## 2025-04-21 DIAGNOSIS — R97.20 ELEVATED PSA: ICD-10-CM

## 2025-04-21 LAB — PSA SERPL-MCNC: 3.29 NG/ML

## 2025-04-21 PROCEDURE — 84153 ASSAY OF PSA TOTAL: CPT

## 2025-04-21 PROCEDURE — 36415 COLL VENOUS BLD VENIPUNCTURE: CPT | Mod: PO

## 2025-06-12 ENCOUNTER — OFFICE VISIT (OUTPATIENT)
Dept: UROLOGY | Facility: CLINIC | Age: 65
End: 2025-06-12
Payer: COMMERCIAL

## 2025-06-12 DIAGNOSIS — N40.0 BPH WITHOUT OBSTRUCTION/LOWER URINARY TRACT SYMPTOMS: ICD-10-CM

## 2025-06-12 DIAGNOSIS — R35.0 URINARY FREQUENCY: ICD-10-CM

## 2025-06-12 DIAGNOSIS — R97.20 ELEVATED PSA: Primary | ICD-10-CM

## 2025-06-12 PROCEDURE — 1159F MED LIST DOCD IN RCRD: CPT | Mod: CPTII,S$GLB,, | Performed by: UROLOGY

## 2025-06-12 PROCEDURE — 99213 OFFICE O/P EST LOW 20 MIN: CPT | Mod: S$GLB,,, | Performed by: UROLOGY

## 2025-06-12 PROCEDURE — 3061F NEG MICROALBUMINURIA REV: CPT | Mod: CPTII,S$GLB,, | Performed by: UROLOGY

## 2025-06-12 PROCEDURE — 3066F NEPHROPATHY DOC TX: CPT | Mod: CPTII,S$GLB,, | Performed by: UROLOGY

## 2025-06-12 PROCEDURE — 3044F HG A1C LEVEL LT 7.0%: CPT | Mod: CPTII,S$GLB,, | Performed by: UROLOGY

## 2025-06-12 PROCEDURE — 99999 PR PBB SHADOW E&M-EST. PATIENT-LVL III: CPT | Mod: PBBFAC,,, | Performed by: UROLOGY

## 2025-06-12 PROCEDURE — 1160F RVW MEDS BY RX/DR IN RCRD: CPT | Mod: CPTII,S$GLB,, | Performed by: UROLOGY

## 2025-06-12 NOTE — PROGRESS NOTES
Subjective:       Patient ID: Blas Alcocer is a 64 y.o. male The patient's last visit with me was on 10/21/2024.       Chief Complaint:   Chief Complaint   Patient presents with    Follow-up         Elevated PSA    He underwent a prostate needle biopsy on 2/15/2022.  His biopsy was indicated due to: Elevated PSA.  Afterwards he experienced: Gross Hematuria and Blood in stool.  These symptoms have resolved.  His PSA prior to biopsy was 4.9.  His prostate size was 85 grams.  The ultrasound did not show a median lobe.  He currently does not have erectile dysfunction.  His pathology showed: benign disease.    9/8/2022  He is back with a new PSA.    3/8/2023  He is back with a new PSA.    4/26/2023  He is back with a MRI.  87 cc, no targets.    06/12/2025  He is back with a new PSA. He feels well.          ACTIVE MEDICAL ISSUES:  Patient Active Problem List   Diagnosis    Hypertriglyceridemia    Prediabetes    Hypovitaminosis D    Hypertension    Sixth nerve palsy of right eye    Brain mass    Wears glasses    Esotropia    Heel pain, bilateral    Abnormal thyroid blood test    Class 1 obesity due to excess calories with serious comorbidity and body mass index (BMI) of 30.0 to 30.9 in adult    Subclinical hyperthyroidism    Type 2 diabetes mellitus with hyperglycemia, without long-term current use of insulin       ALLERGIES AND MEDICATIONS: updated and reviewed.  Review of patient's allergies indicates:  No Known Allergies  Current Outpatient Medications   Medication Sig    amLODIPine (NORVASC) 10 MG tablet TAKE 1 TABLET DAILY    cholecalciferol, vitamin D3, (VITAMIN D3) 50 mcg (2,000 unit) Cap capsule Take by mouth once daily.    diclofenac sodium (VOLTAREN) 1 % Gel Apply 2 g topically 4 (four) times daily.    lisinopriL-hydrochlorothiazide (PRINZIDE,ZESTORETIC) 20-12.5 mg per tablet TAKE 1 TABLET DAILY    TRULICITY 4.5 mg/0.5 mL pen injector Inject 4.5 mg into the skin every 7 days.     Current Facility-Administered  Medications   Medication    gentamicin injection 80 mg       Review of Systems   Constitutional:  Negative for activity change, fatigue, fever and unexpected weight change.   HENT:  Negative for congestion.    Eyes:  Negative for redness.   Respiratory:  Negative for chest tightness and shortness of breath.    Cardiovascular:  Negative for chest pain and leg swelling.   Gastrointestinal:  Negative for abdominal pain, constipation, diarrhea, nausea and vomiting.   Genitourinary:  Negative for dysuria, flank pain, frequency, hematuria, penile pain, penile swelling, scrotal swelling, testicular pain and urgency.   Musculoskeletal:  Negative for arthralgias and back pain.   Neurological:  Negative for dizziness and light-headedness.   Psychiatric/Behavioral:  Negative for behavioral problems and confusion. The patient is not nervous/anxious.    All other systems reviewed and are negative.      Objective:      There were no vitals filed for this visit.          Physical Exam  Vitals and nursing note reviewed.   Constitutional:       Appearance: He is well-developed.   HENT:      Head: Normocephalic.   Eyes:      Conjunctiva/sclera: Conjunctivae normal.   Neck:      Thyroid: No thyromegaly.      Trachea: No tracheal deviation.   Cardiovascular:      Rate and Rhythm: Normal rate.      Heart sounds: Normal heart sounds.   Pulmonary:      Effort: Pulmonary effort is normal. No respiratory distress.      Breath sounds: Normal breath sounds. No wheezing.   Abdominal:      General: Bowel sounds are normal.      Palpations: Abdomen is soft.      Tenderness: There is no abdominal tenderness. There is no rebound.      Hernia: No hernia is present.   Musculoskeletal:         General: No tenderness. Normal range of motion.      Cervical back: Normal range of motion and neck supple.   Lymphadenopathy:      Cervical: No cervical adenopathy.   Skin:     General: Skin is warm and dry.      Findings: No erythema or rash.   Neurological:       Mental Status: He is alert and oriented to person, place, and time.   Psychiatric:         Behavior: Behavior normal.         Thought Content: Thought content normal.         Judgment: Judgment normal.         Urine dipstick shows negative for all components.  Micro exam: negative for WBC's or RBC's.               Component  Ref Range & Units (hover) 1 mo ago 8 mo ago 3 yr ago 6 yr ago 8 yr ago 11 yr ago 13 yr ago   Prostate Specific Antigen 3.29 4.1 High  R, CM 4.9 High  R, CM 3.2 R, CM 2.3 R, CM 1.93 R, CM 1.3 R, CM   Comment: PSA Expected levels:  Hormonal therapy: < 0.05 ng/mL  Prostatectomy: < 0.01 ng/mL  Radiation therapy: < 1.00 ng/mL   Resulting Agency [facility]              Narrative  Performed by: OCOLY  The testing method is a chemiluminescent microparticle immunoassay manufactured by [employer] and performed on the [employer] or [employer] system. Values obtained with different assay manufacturers for methods may be different and cannot be used interchangeably.   Specimen Collected: 04/21/25 07:20 CDT Last Resulted: 04/21/25 13:21 CDT             MRI Prostate W W/O Contrast  Order: [id number]  Status: Final result     Visible to patient: Yes (seen)     Next appt: 08/01/2023 at 08:00 AM in Lab (LAB, [facility] DRAW STATION)     Dx: Elevated PSA     1 Result Note    1 Patient Communication  Details    Reading Physician Reading Date Result Priority   Arthur Fontanez MD  [phone] 4/2/2023 Routine     Narrative & Impression  EXAMINATION:  MRI PROSTATE W W/O CONTRAST     CLINICAL HISTORY:  Prostate cancer suspected;  Elevated prostate specific antigen (PSA)     Additional history: None provided.     TECHNIQUE:  Multiparametric MRI of the prostate/pelvis performed on a 3T scanner with phase pelvic coil. Multiplanar, multisequence images including high resolution, small field-of-view T2-WI; axial diffusion weighted images with multiple B-values and creation of ADC-maps; and  dynamic contrast enhanced T1-weighted images through the prostate were obtained before, during, and after the administration of 10 cc intravenous gadolinium.     COMPARISON:  None.     FINDINGS:  Previous biopsy: Negative biopsy 02/15/2022     PSA: 5.5 ng/mL 01/11/2023     Prior therapy: None     Prostate: 6.1 x 4.7 x 5.8 cm corresponding to a computed volume of 87 cc.     Peripheral zone: Several linear regions of T2 signal hypointensity; score 2.     Transitional zone: Benign prostatic hyperplasia without focal suspicious abnormality, score 2.     Neurovascular bundle: Normal appearance.     Seminal vesicles: Normal appearance.     Adjacent Organ Involvement: No evidence for urinary bladder or rectal invasion.     Lymphadenopathy: None.     Other Findings: None.     Impression:     1. No suspicious focal prostate lesion.  2. BPH.  Overall Assessment: PI-RADS 2 - Low (clinically significant cancer is unlikely to be present)     Number of targets created for potential MR/US fusion biopsy     Peripheral zone: 0     Transition zone: 0        Electronically signed by: Arthur Fontanez  Date:                                            04/02/2023  Time:                                           07:40       Assessment:       1. Elevated PSA    2. BPH without obstruction/lower urinary tract symptoms    3. Urinary frequency                    Plan:       1. Elevated PSA (Primary)  Improved  monitor  - Prostate Specific Antigen, Diagnostic; Future    2. BPH without obstruction/lower urinary tract symptoms  Doing well    3. Urinary frequency  stable             Follow up in about 1 year (around 6/12/2026) for Follow up Established, Review PSA.

## 2025-06-13 ENCOUNTER — OFFICE VISIT (OUTPATIENT)
Dept: FAMILY MEDICINE | Facility: CLINIC | Age: 65
End: 2025-06-13
Payer: COMMERCIAL

## 2025-06-13 VITALS
DIASTOLIC BLOOD PRESSURE: 86 MMHG | OXYGEN SATURATION: 98 % | SYSTOLIC BLOOD PRESSURE: 110 MMHG | HEART RATE: 70 BPM | TEMPERATURE: 98 F | WEIGHT: 177.25 LBS | HEIGHT: 69 IN | BODY MASS INDEX: 26.25 KG/M2

## 2025-06-13 DIAGNOSIS — I10 ESSENTIAL HYPERTENSION: ICD-10-CM

## 2025-06-13 DIAGNOSIS — I10 PRIMARY HYPERTENSION: ICD-10-CM

## 2025-06-13 DIAGNOSIS — D32.9 MENINGIOMA: ICD-10-CM

## 2025-06-13 DIAGNOSIS — Z00.00 ANNUAL PHYSICAL EXAM: Primary | ICD-10-CM

## 2025-06-13 PROCEDURE — 99999 PR PBB SHADOW E&M-EST. PATIENT-LVL III: CPT | Mod: PBBFAC,,, | Performed by: FAMILY MEDICINE

## 2025-06-13 RX ORDER — AMLODIPINE BESYLATE 10 MG/1
10 TABLET ORAL DAILY
Qty: 90 TABLET | Refills: 3 | Status: SHIPPED | OUTPATIENT
Start: 2025-06-13

## 2025-06-13 RX ORDER — LISINOPRIL AND HYDROCHLOROTHIAZIDE 12.5; 2 MG/1; MG/1
1 TABLET ORAL DAILY
Qty: 90 TABLET | Refills: 3 | Status: SHIPPED | OUTPATIENT
Start: 2025-06-13

## 2025-06-13 NOTE — PROGRESS NOTES
"Chief Complaint   Patient presents with    Annual Exam       SUBJECTIVE:   Blas Alcocer is a 65 y.o. male presenting for his annual checkup.   Current Medications[1]  Allergies: Patient has no known allergies.   Patient Active Problem List    Diagnosis Date Noted    Meningioma 06/19/2025    Subclinical hyperthyroidism 07/18/2022    Type 2 diabetes mellitus with hyperglycemia, without long-term current use of insulin 07/18/2022    Abnormal thyroid blood test 04/18/2022    Class 1 obesity due to excess calories with serious comorbidity and body mass index (BMI) of 30.0 to 30.9 in adult 04/18/2022    Heel pain, bilateral 04/25/2019    Esotropia 02/14/2019    Wears glasses 07/07/2017    Brain mass 01/04/2017    Sixth nerve palsy of right eye 12/06/2016    Hypertension 08/19/2014    Hypertriglyceridemia     Prediabetes     Hypovitaminosis D        ROS:  Feeling well. No dyspnea or chest pain on exertion. No abdominal pain, change in bowel habits, black or bloody stools. No urinary tract or prostatic symptoms. No neurological complaints.    OBJECTIVE:   The patient appears well, alert, oriented x 3, in no distress.   /86   Pulse 70   Temp 98 °F (36.7 °C) (Oral)   Ht 5' 9" (1.753 m)   Wt 80.4 kg (177 lb 4 oz)   SpO2 98%   BMI 26.18 kg/m²   Wt Readings from Last 5 Encounters:   06/13/25 80.4 kg (177 lb 4 oz)   02/20/25 80.6 kg (177 lb 9.6 oz)   10/21/24 78.8 kg (173 lb 9.8 oz)   10/02/24 79.2 kg (174 lb 9.7 oz)   08/19/24 79.3 kg (174 lb 12.8 oz)       ENT normal.  Neck supple. No adenopathy or thyromegaly. KEVIN. Lungs are clear, good air entry, no wheezes, rhonchi or rales. S1 and S2 normal, no murmurs, regular rate and rhythm. Abdomen is soft without tenderness, guarding, mass or organomegaly.  exam: deferred.  Extremities show no edema, normal peripheral pulses. Neurological is normal without focal findings.    ASSESSMENT:   1. Annual physical exam    2. Primary hypertension    3. Essential hypertension  "   4. Meningioma          PLAN:   Counseled on age appropriate medical preventative services, including age appropriate cancer screenings, over all nutritional health, need for a consistent exercise regimen and an over all push towards maintaining a vigorous and active lifestyle.  Counseled on age appropriate vaccines and discussed upcoming health care needs based on age/gender.  Spent time with patient counseling on need for a good patient/doctor relationship moving forward.  Discussed use of common OTC medications and supplements.  Discussed common dietary aids and use of caffeine and the need for good sleep hygiene and stress management.    Problem List Items Addressed This Visit       Hypertension    Relevant Medications    amLODIPine (NORVASC) 10 MG tablet    lisinopriL-hydrochlorothiazide (PRINZIDE,ZESTORETIC) 20-12.5 mg per tablet    Meningioma    Current Assessment & Plan   Noted  Monitor for any new symptoms  stable          Other Visit Diagnoses         Annual physical exam    -  Primary      Essential hypertension        Relevant Medications    lisinopriL-hydrochlorothiazide (PRINZIDE,ZESTORETIC) 20-12.5 mg per tablet                   [1]   Current Outpatient Medications   Medication Sig Dispense Refill    cholecalciferol, vitamin D3, (VITAMIN D3) 50 mcg (2,000 unit) Cap capsule Take by mouth once daily.      diclofenac sodium (VOLTAREN) 1 % Gel Apply 2 g topically 4 (four) times daily. 100 g 0    TRULICITY 4.5 mg/0.5 mL pen injector Inject 4.5 mg into the skin every 7 days. 12 pen 3    amLODIPine (NORVASC) 10 MG tablet Take 1 tablet (10 mg total) by mouth once daily. 90 tablet 3    lisinopriL-hydrochlorothiazide (PRINZIDE,ZESTORETIC) 20-12.5 mg per tablet Take 1 tablet by mouth once daily. 90 tablet 3     Current Facility-Administered Medications   Medication Dose Route Frequency Provider Last Rate Last Admin    gentamicin injection 80 mg  80 mg Intramuscular 1 time in Clinic/HOD Mayur Mckeon,  MD

## 2025-06-19 PROBLEM — D32.9 MENINGIOMA: Status: ACTIVE | Noted: 2025-06-19

## 2025-08-05 ENCOUNTER — TELEPHONE (OUTPATIENT)
Dept: FAMILY MEDICINE | Facility: CLINIC | Age: 65
End: 2025-08-05
Payer: COMMERCIAL

## 2025-08-05 ENCOUNTER — E-VISIT (OUTPATIENT)
Dept: FAMILY MEDICINE | Facility: CLINIC | Age: 65
End: 2025-08-05
Payer: COMMERCIAL

## 2025-08-05 DIAGNOSIS — U07.1 POSITIVE SELF-ADMINISTERED ANTIGEN TEST FOR COVID-19: Primary | ICD-10-CM

## 2025-08-05 RX ORDER — NIRMATRELVIR AND RITONAVIR 300-100 MG
KIT ORAL
Qty: 30 TABLET | Refills: 0 | Status: SHIPPED | OUTPATIENT
Start: 2025-08-05 | End: 2025-08-10

## 2025-08-05 NOTE — PROGRESS NOTES
Patient ID: Blas Alcocer is a 65 y.o. male.        E-Visit Time Tracking:             Chief Complaint: General Illness (Entered automatically based on patient selection in DNA SEQ.)      The patient initiated a request through DNA SEQ on 8/5/2025 for evaluation and management with a chief complaint of General Illness (Entered automatically based on patient selection in DNA SEQ.)     I evaluated the questionnaire submission on 08/05/2025.    [facility]-Cough And Cold    8/5/2025 11:03 AM CDT - Filed by Patient   What do you need help with? Covid 19   Do you agree to participate in an E-Visit? Yes   If you have any of the following symptoms, go to your local emergency room or call 911: I acknowledge   What is the main issue you would like addressed today? Positive rapid Covid test results   Do you think you might have COVID-19 or the Flu? (COVID-19, Flu, No, Not sure) COVID-19   Have you tested positive for COVID-19 or Flu?(COVID-19, Flu, No) COVID-19   What symptoms do you have? (Chills, Cough, Diarrhea, Fatigue, Fever, Headache, Stuffy nose,  Loss of taste or smell, Muscule or body aches, Nausea, Runny nose, Sore throat, None) None   When did your concern begin? 8/5/2025   What have you tried to help your symptoms?(Cold medication, Cough syrup, Drank fluids (water, tea), Gargled salt water, Ibuprofen or Naproxen, Rest and sleep, Tylenol, Other) Cold medication   Provide any additional information you feel is important.    Please attach any relevant images or files    Are you able to take your vitals? No         Encounter Diagnosis   Name Primary?    Positive self-administered antigen test for COVID-19 Yes        No orders of the defined types were placed in this encounter.     Medications Ordered This Encounter   Medications    nirmatrelvir-ritonavir (PAXLOVID) 300 mg (150 mg x 2)-100 mg copackaged tablets (EUA)     Sig: Take 3 tablets by mouth 2 (two) times daily. Each dose contains 2 nirmatrelvir  (pink tablets) and 1 ritonavir (white tablet). Take all 3 tablets together for 5 days     Dispense:  30 tablet     Refill:  0        Follow up if symptoms worsen or fail to improve.

## 2025-08-05 NOTE — TELEPHONE ENCOUNTER
Copied from CRM #[id number]. Topic: General Inquiry - Patient Advice  >> Aug 5, 2025 10:15 AM Elizabeth wrote:  .Type:  Needs Medical Advice/Symptom-based Call    Who Called: self    Symptoms (please be specific): no symptoms covid rapid test     How long has patient had these symptoms:  this morning     Would the patient rather a call back or a response via [facility]? Call back     Best Call Back Number: .[phone]      Additional Information: pt would like to know what to take or if something can be sent , pt wants a call back

## 2025-08-06 ENCOUNTER — PATIENT MESSAGE (OUTPATIENT)
Dept: FAMILY MEDICINE | Facility: CLINIC | Age: 65
End: 2025-08-06
Payer: COMMERCIAL

## 2025-08-14 ENCOUNTER — LAB VISIT (OUTPATIENT)
Dept: LAB | Facility: HOSPITAL | Age: 65
End: 2025-08-14
Attending: HOSPITALIST
Payer: COMMERCIAL

## 2025-08-14 DIAGNOSIS — E55.9 HYPOVITAMINOSIS D: ICD-10-CM

## 2025-08-14 DIAGNOSIS — E11.65 TYPE 2 DIABETES MELLITUS WITH HYPERGLYCEMIA, WITHOUT LONG-TERM CURRENT USE OF INSULIN: ICD-10-CM

## 2025-08-14 LAB
25(OH)D3+25(OH)D2 SERPL-MCNC: 40 NG/ML (ref 30–96)
EAG (OHS): 105 MG/DL (ref 68–131)
HBA1C MFR BLD: 5.3 % (ref 4–5.6)
T4 FREE SERPL-MCNC: 1.09 NG/DL (ref 0.71–1.51)
T4 FREE SERPL-MCNC: 1.09 NG/DL (ref 0.71–1.51)
TSH SERPL-ACNC: 0.32 UIU/ML (ref 0.4–4)

## 2025-08-14 PROCEDURE — 84443 ASSAY THYROID STIM HORMONE: CPT

## 2025-08-14 PROCEDURE — 36415 COLL VENOUS BLD VENIPUNCTURE: CPT | Mod: PO

## 2025-08-14 PROCEDURE — 83036 HEMOGLOBIN GLYCOSYLATED A1C: CPT

## 2025-08-14 PROCEDURE — 82306 VITAMIN D 25 HYDROXY: CPT

## 2025-08-21 ENCOUNTER — PATIENT MESSAGE (OUTPATIENT)
Dept: ENDOCRINOLOGY | Facility: CLINIC | Age: 65
End: 2025-08-21

## 2025-08-21 ENCOUNTER — OFFICE VISIT (OUTPATIENT)
Dept: ENDOCRINOLOGY | Facility: CLINIC | Age: 65
End: 2025-08-21
Payer: COMMERCIAL

## 2025-08-21 VITALS
BODY MASS INDEX: 26.4 KG/M2 | WEIGHT: 178.81 LBS | DIASTOLIC BLOOD PRESSURE: 83 MMHG | SYSTOLIC BLOOD PRESSURE: 118 MMHG | HEART RATE: 74 BPM

## 2025-08-21 DIAGNOSIS — E55.9 HYPOVITAMINOSIS D: ICD-10-CM

## 2025-08-21 DIAGNOSIS — E66.811 CLASS 1 OBESITY DUE TO EXCESS CALORIES WITH SERIOUS COMORBIDITY AND BODY MASS INDEX (BMI) OF 30.0 TO 30.9 IN ADULT: ICD-10-CM

## 2025-08-21 DIAGNOSIS — E11.65 TYPE 2 DIABETES MELLITUS WITH HYPERGLYCEMIA, WITHOUT LONG-TERM CURRENT USE OF INSULIN: Primary | ICD-10-CM

## 2025-08-21 DIAGNOSIS — E05.90 SUBCLINICAL HYPERTHYROIDISM: ICD-10-CM

## 2025-08-21 DIAGNOSIS — E11.65 TYPE 2 DIABETES MELLITUS WITH HYPERGLYCEMIA, WITHOUT LONG-TERM CURRENT USE OF INSULIN: ICD-10-CM

## 2025-08-21 DIAGNOSIS — E66.09 CLASS 1 OBESITY DUE TO EXCESS CALORIES WITH SERIOUS COMORBIDITY AND BODY MASS INDEX (BMI) OF 30.0 TO 30.9 IN ADULT: ICD-10-CM

## 2025-08-21 PROCEDURE — 99999 PR PBB SHADOW E&M-EST. PATIENT-LVL III: CPT | Mod: PBBFAC,,, | Performed by: HOSPITALIST

## 2025-08-21 RX ORDER — DULAGLUTIDE 4.5 MG/.5ML
4.5 INJECTION, SOLUTION SUBCUTANEOUS
Qty: 12 PEN | Refills: 3 | Status: SHIPPED | OUTPATIENT
Start: 2025-08-21 | End: 2025-08-21 | Stop reason: SDUPTHER

## 2025-08-21 RX ORDER — DULAGLUTIDE 4.5 MG/.5ML
4.5 INJECTION, SOLUTION SUBCUTANEOUS
Qty: 12 PEN | Refills: 3 | Status: SHIPPED | OUTPATIENT
Start: 2025-08-21 | End: 2026-08-21

## 2025-08-26 ENCOUNTER — PATIENT MESSAGE (OUTPATIENT)
Dept: ENDOCRINOLOGY | Facility: CLINIC | Age: 65
End: 2025-08-26
Payer: COMMERCIAL